# Patient Record
Sex: MALE | Race: WHITE | NOT HISPANIC OR LATINO | Employment: OTHER | URBAN - METROPOLITAN AREA
[De-identification: names, ages, dates, MRNs, and addresses within clinical notes are randomized per-mention and may not be internally consistent; named-entity substitution may affect disease eponyms.]

---

## 2024-09-23 ENCOUNTER — HOSPITAL ENCOUNTER (INPATIENT)
Facility: HOSPITAL | Age: 59
LOS: 5 days | Discharge: HOME | DRG: 897 | End: 2024-09-28
Attending: EMERGENCY MEDICINE | Admitting: INTERNAL MEDICINE
Payer: COMMERCIAL

## 2024-09-23 DIAGNOSIS — I48.91 ATRIAL FIBRILLATION WITH RAPID VENTRICULAR RESPONSE: ICD-10-CM

## 2024-09-23 DIAGNOSIS — R79.89 ELEVATED TROPONIN I LEVEL: Primary | ICD-10-CM

## 2024-09-23 DIAGNOSIS — I21.4 NSTEMI (NON-ST ELEVATED MYOCARDIAL INFARCTION): ICD-10-CM

## 2024-09-23 DIAGNOSIS — I25.10 CAD (CORONARY ARTERY DISEASE): ICD-10-CM

## 2024-09-23 DIAGNOSIS — N17.9 AKI (ACUTE KIDNEY INJURY): ICD-10-CM

## 2024-09-23 DIAGNOSIS — F10.939 ALCOHOL WITHDRAWAL: ICD-10-CM

## 2024-09-23 LAB
AMPHET UR QL SCN: NEGATIVE
ANION GAP SERPL CALC-SCNC: 11 MEQ/L
ANION GAP SERPL CALC-SCNC: 19 MEQ/L
BACTERIA #/AREA URNS AUTO: ABNORMAL /HPF
BARBITURATE SCN PRESENT UR: NEGATIVE
BASOPHILS # BLD AUTO: 0.02 X10(3)/MCL
BASOPHILS NFR BLD AUTO: 0.1 %
BENZODIAZ UR QL SCN: NEGATIVE
BILIRUB UR QL STRIP.AUTO: NEGATIVE
BNP BLD-MCNC: 106.7 PG/ML
BUN SERPL-MCNC: 34.7 MG/DL (ref 8.4–25.7)
BUN SERPL-MCNC: 38.2 MG/DL (ref 8.4–25.7)
CALCIUM SERPL-MCNC: 7.9 MG/DL (ref 8.8–10)
CALCIUM SERPL-MCNC: 9.1 MG/DL (ref 8.8–10)
CANNABINOIDS UR QL SCN: NEGATIVE
CHLORIDE SERPL-SCNC: 79 MMOL/L (ref 98–107)
CHLORIDE SERPL-SCNC: 90 MMOL/L (ref 98–107)
CLARITY UR: ABNORMAL
CO2 SERPL-SCNC: 33 MMOL/L (ref 23–31)
CO2 SERPL-SCNC: 33 MMOL/L (ref 23–31)
COCAINE UR QL SCN: NEGATIVE
COLOR UR AUTO: YELLOW
CREAT SERPL-MCNC: 3.3 MG/DL (ref 0.73–1.18)
CREAT SERPL-MCNC: 3.99 MG/DL (ref 0.73–1.18)
CREAT/UREA NIT SERPL: 12
CREAT/UREA NIT SERPL: 9
EOSINOPHIL # BLD AUTO: 0.03 X10(3)/MCL (ref 0–0.9)
EOSINOPHIL NFR BLD AUTO: 0.2 %
ERYTHROCYTE [DISTWIDTH] IN BLOOD BY AUTOMATED COUNT: 13.3 % (ref 11.5–17)
ETHANOL SERPL-MCNC: <10 MG/DL
FENTANYL UR QL SCN: NEGATIVE
GFR SERPLBLD CREATININE-BSD FMLA CKD-EPI: 16 ML/MIN/1.73/M2
GFR SERPLBLD CREATININE-BSD FMLA CKD-EPI: 21 ML/MIN/1.73/M2
GLUCOSE SERPL-MCNC: 104 MG/DL (ref 82–115)
GLUCOSE SERPL-MCNC: 131 MG/DL (ref 82–115)
GLUCOSE UR QL STRIP: NORMAL
HCT VFR BLD AUTO: 46 % (ref 42–52)
HGB BLD-MCNC: 16.3 G/DL (ref 14–18)
HGB UR QL STRIP: ABNORMAL
HOLD SPECIMEN: NORMAL
HOLD SPECIMEN: NORMAL
HYALINE CASTS #/AREA URNS LPF: ABNORMAL /LPF
IMM GRANULOCYTES # BLD AUTO: 0.09 X10(3)/MCL (ref 0–0.04)
IMM GRANULOCYTES NFR BLD AUTO: 0.6 %
KETONES UR QL STRIP: NEGATIVE
LACTATE SERPL-SCNC: 1.5 MMOL/L (ref 0.5–2.2)
LEUKOCYTE ESTERASE UR QL STRIP: NEGATIVE
LIPASE SERPL-CCNC: 54 U/L
LYMPHOCYTES # BLD AUTO: 1.81 X10(3)/MCL (ref 0.6–4.6)
LYMPHOCYTES NFR BLD AUTO: 11.7 %
MAGNESIUM SERPL-MCNC: 1.5 MG/DL (ref 1.6–2.6)
MAGNESIUM SERPL-MCNC: 2.2 MG/DL (ref 1.6–2.6)
MCH RBC QN AUTO: 29.3 PG (ref 27–31)
MCHC RBC AUTO-ENTMCNC: 35.4 G/DL (ref 33–36)
MCV RBC AUTO: 82.6 FL (ref 80–94)
MDMA UR QL SCN: NEGATIVE
MONOCYTES # BLD AUTO: 0.82 X10(3)/MCL (ref 0.1–1.3)
MONOCYTES NFR BLD AUTO: 5.3 %
MUCOUS THREADS URNS QL MICRO: ABNORMAL /LPF
NEUTROPHILS # BLD AUTO: 12.73 X10(3)/MCL (ref 2.1–9.2)
NEUTROPHILS NFR BLD AUTO: 82.1 %
NITRITE UR QL STRIP: NEGATIVE
NRBC BLD AUTO-RTO: 0 %
OPIATES UR QL SCN: NEGATIVE
PCP UR QL: NEGATIVE
PH UR STRIP: 6 [PH]
PH UR: 5.5 [PH] (ref 3–11)
PHOSPHATE SERPL-MCNC: 3.5 MG/DL (ref 2.3–4.7)
PLATELET # BLD AUTO: 282 X10(3)/MCL (ref 130–400)
PMV BLD AUTO: 9.7 FL (ref 7.4–10.4)
POTASSIUM SERPL-SCNC: 2.7 MMOL/L (ref 3.5–5.1)
POTASSIUM SERPL-SCNC: 2.8 MMOL/L (ref 3.5–5.1)
PROT UR QL STRIP: ABNORMAL
RBC # BLD AUTO: 5.57 X10(6)/MCL (ref 4.7–6.1)
RBC #/AREA URNS AUTO: ABNORMAL /HPF
SALICYLATES SERPL-MCNC: <5 MG/DL (ref 15–30)
SODIUM SERPL-SCNC: 131 MMOL/L (ref 136–145)
SODIUM SERPL-SCNC: 134 MMOL/L (ref 136–145)
SODIUM UR-SCNC: 27 MMOL/L
SP GR UR STRIP.AUTO: 1.01 (ref 1–1.03)
SPECIFIC GRAVITY, URINE AUTO (.000) (OHS): 1 (ref 1–1.03)
SQUAMOUS #/AREA URNS LPF: ABNORMAL /HPF
TROPONIN I SERPL-MCNC: 0.23 NG/ML (ref 0–0.04)
TROPONIN I SERPL-MCNC: 0.39 NG/ML (ref 0–0.04)
UROBILINOGEN UR STRIP-ACNC: NORMAL
WBC # BLD AUTO: 15.5 X10(3)/MCL (ref 4.5–11.5)
WBC #/AREA URNS AUTO: ABNORMAL /HPF

## 2024-09-23 PROCEDURE — 84484 ASSAY OF TROPONIN QUANT: CPT | Performed by: EMERGENCY MEDICINE

## 2024-09-23 PROCEDURE — 80048 BASIC METABOLIC PNL TOTAL CA: CPT | Performed by: EMERGENCY MEDICINE

## 2024-09-23 PROCEDURE — 87040 BLOOD CULTURE FOR BACTERIA: CPT | Performed by: EMERGENCY MEDICINE

## 2024-09-23 PROCEDURE — 80179 DRUG ASSAY SALICYLATE: CPT

## 2024-09-23 PROCEDURE — 80320 DRUG SCREEN QUANTALCOHOLS: CPT

## 2024-09-23 PROCEDURE — 25000003 PHARM REV CODE 250

## 2024-09-23 PROCEDURE — 63600175 PHARM REV CODE 636 W HCPCS: Performed by: EMERGENCY MEDICINE

## 2024-09-23 PROCEDURE — 84300 ASSAY OF URINE SODIUM: CPT

## 2024-09-23 PROCEDURE — 83605 ASSAY OF LACTIC ACID: CPT | Performed by: EMERGENCY MEDICINE

## 2024-09-23 PROCEDURE — 11000001 HC ACUTE MED/SURG PRIVATE ROOM

## 2024-09-23 PROCEDURE — 63600175 PHARM REV CODE 636 W HCPCS: Performed by: INTERNAL MEDICINE

## 2024-09-23 PROCEDURE — 93005 ELECTROCARDIOGRAM TRACING: CPT

## 2024-09-23 PROCEDURE — 84100 ASSAY OF PHOSPHORUS: CPT

## 2024-09-23 PROCEDURE — 36415 COLL VENOUS BLD VENIPUNCTURE: CPT

## 2024-09-23 PROCEDURE — 96375 TX/PRO/DX INJ NEW DRUG ADDON: CPT

## 2024-09-23 PROCEDURE — 21400001 HC TELEMETRY ROOM

## 2024-09-23 PROCEDURE — 99291 CRITICAL CARE FIRST HOUR: CPT

## 2024-09-23 PROCEDURE — 83690 ASSAY OF LIPASE: CPT | Performed by: EMERGENCY MEDICINE

## 2024-09-23 PROCEDURE — 83735 ASSAY OF MAGNESIUM: CPT

## 2024-09-23 PROCEDURE — 85025 COMPLETE CBC W/AUTO DIFF WBC: CPT | Performed by: EMERGENCY MEDICINE

## 2024-09-23 PROCEDURE — 84484 ASSAY OF TROPONIN QUANT: CPT

## 2024-09-23 PROCEDURE — 25000003 PHARM REV CODE 250: Performed by: INTERNAL MEDICINE

## 2024-09-23 PROCEDURE — 63600175 PHARM REV CODE 636 W HCPCS

## 2024-09-23 PROCEDURE — 96365 THER/PROPH/DIAG IV INF INIT: CPT | Mod: 59

## 2024-09-23 PROCEDURE — 96361 HYDRATE IV INFUSION ADD-ON: CPT

## 2024-09-23 PROCEDURE — 80048 BASIC METABOLIC PNL TOTAL CA: CPT

## 2024-09-23 PROCEDURE — 25000003 PHARM REV CODE 250: Performed by: EMERGENCY MEDICINE

## 2024-09-23 PROCEDURE — 80307 DRUG TEST PRSMV CHEM ANLYZR: CPT

## 2024-09-23 PROCEDURE — 82077 ASSAY SPEC XCP UR&BREATH IA: CPT | Performed by: EMERGENCY MEDICINE

## 2024-09-23 PROCEDURE — 83880 ASSAY OF NATRIURETIC PEPTIDE: CPT | Performed by: EMERGENCY MEDICINE

## 2024-09-23 PROCEDURE — 83735 ASSAY OF MAGNESIUM: CPT | Performed by: EMERGENCY MEDICINE

## 2024-09-23 PROCEDURE — 81001 URINALYSIS AUTO W/SCOPE: CPT | Performed by: EMERGENCY MEDICINE

## 2024-09-23 RX ORDER — POTASSIUM CHLORIDE 20 MEQ/1
40 TABLET, EXTENDED RELEASE ORAL ONCE
Status: COMPLETED | OUTPATIENT
Start: 2024-09-23 | End: 2024-09-23

## 2024-09-23 RX ORDER — CHLORDIAZEPOXIDE HYDROCHLORIDE 25 MG/1
25 CAPSULE, GELATIN COATED ORAL
Status: DISCONTINUED | OUTPATIENT
Start: 2024-09-29 | End: 2024-09-28 | Stop reason: HOSPADM

## 2024-09-23 RX ORDER — LIDOCAINE HYDROCHLORIDE 20 MG/ML
15 SOLUTION OROPHARYNGEAL ONCE
Status: COMPLETED | OUTPATIENT
Start: 2024-09-23 | End: 2024-09-23

## 2024-09-23 RX ORDER — SODIUM CHLORIDE, SODIUM LACTATE, POTASSIUM CHLORIDE, CALCIUM CHLORIDE 600; 310; 30; 20 MG/100ML; MG/100ML; MG/100ML; MG/100ML
INJECTION, SOLUTION INTRAVENOUS CONTINUOUS
Status: DISCONTINUED | OUTPATIENT
Start: 2024-09-23 | End: 2024-09-24

## 2024-09-23 RX ORDER — LISINOPRIL 40 MG/1
40 TABLET ORAL DAILY
Status: ON HOLD | COMMUNITY
End: 2024-09-27 | Stop reason: HOSPADM

## 2024-09-23 RX ORDER — POTASSIUM CHLORIDE 20 MEQ/1
40 TABLET, EXTENDED RELEASE ORAL ONCE
Status: COMPLETED | OUTPATIENT
Start: 2024-09-24 | End: 2024-09-23

## 2024-09-23 RX ORDER — THIAMINE HCL 100 MG
100 TABLET ORAL DAILY
Status: DISCONTINUED | OUTPATIENT
Start: 2024-09-24 | End: 2024-09-28 | Stop reason: HOSPADM

## 2024-09-23 RX ORDER — CHLORDIAZEPOXIDE HYDROCHLORIDE 25 MG/1
25 CAPSULE, GELATIN COATED ORAL
Status: COMPLETED | OUTPATIENT
Start: 2024-09-27 | End: 2024-09-27

## 2024-09-23 RX ORDER — SODIUM CHLORIDE 0.9 % (FLUSH) 0.9 %
10 SYRINGE (ML) INJECTION
Status: DISCONTINUED | OUTPATIENT
Start: 2024-09-23 | End: 2024-09-28 | Stop reason: HOSPADM

## 2024-09-23 RX ORDER — NOREPINEPHRINE BITARTRATE/D5W 4MG/250ML
0-3 PLASTIC BAG, INJECTION (ML) INTRAVENOUS CONTINUOUS
Status: DISCONTINUED | OUTPATIENT
Start: 2024-09-23 | End: 2024-09-25

## 2024-09-23 RX ORDER — CHLORDIAZEPOXIDE HYDROCHLORIDE 25 MG/1
100 CAPSULE, GELATIN COATED ORAL ONCE
Status: COMPLETED | OUTPATIENT
Start: 2024-09-23 | End: 2024-09-24

## 2024-09-23 RX ORDER — CHLORDIAZEPOXIDE HYDROCHLORIDE 25 MG/1
25 CAPSULE, GELATIN COATED ORAL
Status: DISCONTINUED | OUTPATIENT
Start: 2024-09-28 | End: 2024-09-28 | Stop reason: HOSPADM

## 2024-09-23 RX ORDER — CHLORDIAZEPOXIDE HYDROCHLORIDE 25 MG/1
50 CAPSULE, GELATIN COATED ORAL
Status: COMPLETED | OUTPATIENT
Start: 2024-09-24 | End: 2024-09-24

## 2024-09-23 RX ORDER — NOREPINEPHRINE BITARTRATE/D5W 4MG/250ML
0-3 PLASTIC BAG, INJECTION (ML) INTRAVENOUS CONTINUOUS
Status: DISCONTINUED | OUTPATIENT
Start: 2024-09-23 | End: 2024-09-23

## 2024-09-23 RX ORDER — POTASSIUM CHLORIDE 7.45 MG/ML
10 INJECTION INTRAVENOUS
Status: COMPLETED | OUTPATIENT
Start: 2024-09-24 | End: 2024-09-24

## 2024-09-23 RX ORDER — SODIUM CHLORIDE AND POTASSIUM CHLORIDE 150; 900 MG/100ML; MG/100ML
INJECTION, SOLUTION INTRAVENOUS
Status: COMPLETED | OUTPATIENT
Start: 2024-09-23 | End: 2024-09-23

## 2024-09-23 RX ORDER — THIAMINE HYDROCHLORIDE 100 MG/ML
400 INJECTION, SOLUTION INTRAMUSCULAR; INTRAVENOUS
Status: COMPLETED | OUTPATIENT
Start: 2024-09-23 | End: 2024-09-23

## 2024-09-23 RX ORDER — CHLORDIAZEPOXIDE HYDROCHLORIDE 25 MG/1
25 CAPSULE, GELATIN COATED ORAL
Status: COMPLETED | OUTPATIENT
Start: 2024-09-26 | End: 2024-09-26

## 2024-09-23 RX ORDER — LORAZEPAM 2 MG/ML
1 INJECTION INTRAMUSCULAR
Status: DISCONTINUED | OUTPATIENT
Start: 2024-09-23 | End: 2024-09-23

## 2024-09-23 RX ORDER — LORAZEPAM 2 MG/ML
2 INJECTION INTRAMUSCULAR
Status: DISCONTINUED | OUTPATIENT
Start: 2024-09-23 | End: 2024-09-28 | Stop reason: HOSPADM

## 2024-09-23 RX ORDER — TALC
6 POWDER (GRAM) TOPICAL NIGHTLY PRN
Status: DISCONTINUED | OUTPATIENT
Start: 2024-09-23 | End: 2024-09-28 | Stop reason: HOSPADM

## 2024-09-23 RX ORDER — LIDOCAINE HYDROCHLORIDE 20 MG/ML
15 SOLUTION OROPHARYNGEAL
Status: COMPLETED | OUTPATIENT
Start: 2024-09-23 | End: 2024-09-23

## 2024-09-23 RX ORDER — MAGNESIUM SULFATE HEPTAHYDRATE 40 MG/ML
2 INJECTION, SOLUTION INTRAVENOUS
Status: COMPLETED | OUTPATIENT
Start: 2024-09-23 | End: 2024-09-23

## 2024-09-23 RX ORDER — ALUMINUM HYDROXIDE, MAGNESIUM HYDROXIDE, AND SIMETHICONE 1200; 120; 1200 MG/30ML; MG/30ML; MG/30ML
30 SUSPENSION ORAL ONCE
Status: COMPLETED | OUTPATIENT
Start: 2024-09-23 | End: 2024-09-23

## 2024-09-23 RX ORDER — CHLORDIAZEPOXIDE HYDROCHLORIDE 25 MG/1
50 CAPSULE, GELATIN COATED ORAL
Status: COMPLETED | OUTPATIENT
Start: 2024-09-25 | End: 2024-09-25

## 2024-09-23 RX ORDER — FOLIC ACID 1 MG/1
1 TABLET ORAL DAILY
Status: DISCONTINUED | OUTPATIENT
Start: 2024-09-24 | End: 2024-09-28 | Stop reason: HOSPADM

## 2024-09-23 RX ORDER — METOPROLOL TARTRATE 1 MG/ML
5 INJECTION, SOLUTION INTRAVENOUS EVERY 5 MIN PRN
Status: DISCONTINUED | OUTPATIENT
Start: 2024-09-23 | End: 2024-09-23

## 2024-09-23 RX ORDER — METOPROLOL TARTRATE 1 MG/ML
5 INJECTION, SOLUTION INTRAVENOUS EVERY 5 MIN PRN
Status: DISCONTINUED | OUTPATIENT
Start: 2024-09-23 | End: 2024-09-28 | Stop reason: HOSPADM

## 2024-09-23 RX ADMIN — ALUMINUM HYDROXIDE AND MAGNESIUM HYDROXIDE 30 ML: 200; 200 SUSPENSION ORAL at 04:09

## 2024-09-23 RX ADMIN — SODIUM CHLORIDE 1000 ML: 9 INJECTION, SOLUTION INTRAVENOUS at 05:09

## 2024-09-23 RX ADMIN — ALUMINUM HYDROXIDE, MAGNESIUM HYDROXIDE, AND DIMETHICONE 30 ML: 200; 20; 200 SUSPENSION ORAL at 06:09

## 2024-09-23 RX ADMIN — PIPERACILLIN SODIUM AND TAZOBACTAM SODIUM 4.5 G: 4; .5 INJECTION, POWDER, LYOPHILIZED, FOR SOLUTION INTRAVENOUS at 06:09

## 2024-09-23 RX ADMIN — SODIUM CHLORIDE 1000 ML: 9 INJECTION, SOLUTION INTRAVENOUS at 04:09

## 2024-09-23 RX ADMIN — POTASSIUM CHLORIDE 40 MEQ: 1500 TABLET, EXTENDED RELEASE ORAL at 08:09

## 2024-09-23 RX ADMIN — POTASSIUM CHLORIDE 40 MEQ: 1500 TABLET, EXTENDED RELEASE ORAL at 11:09

## 2024-09-23 RX ADMIN — SODIUM CHLORIDE, POTASSIUM CHLORIDE, SODIUM LACTATE AND CALCIUM CHLORIDE: 600; 310; 30; 20 INJECTION, SOLUTION INTRAVENOUS at 09:09

## 2024-09-23 RX ADMIN — POTASSIUM CHLORIDE 10 MEQ: 7.46 INJECTION, SOLUTION INTRAVENOUS at 11:09

## 2024-09-23 RX ADMIN — LIDOCAINE HYDROCHLORIDE 15 ML: 20 SOLUTION ORAL at 04:09

## 2024-09-23 RX ADMIN — LIDOCAINE HYDROCHLORIDE 15 ML: 20 SOLUTION ORAL at 06:09

## 2024-09-23 RX ADMIN — CHLORDIAZEPOXIDE HYDROCHLORIDE 100 MG: 25 CAPSULE ORAL at 08:09

## 2024-09-23 RX ADMIN — MAGNESIUM SULFATE HEPTAHYDRATE 2 G: 40 INJECTION, SOLUTION INTRAVENOUS at 06:09

## 2024-09-23 RX ADMIN — VANCOMYCIN HYDROCHLORIDE 1500 MG: 1.5 INJECTION, POWDER, LYOPHILIZED, FOR SOLUTION INTRAVENOUS at 09:09

## 2024-09-23 RX ADMIN — POTASSIUM CHLORIDE AND SODIUM CHLORIDE: 900; 150 INJECTION, SOLUTION INTRAVENOUS at 06:09

## 2024-09-23 RX ADMIN — THIAMINE HYDROCHLORIDE 400 MG: 100 INJECTION, SOLUTION INTRAMUSCULAR; INTRAVENOUS at 04:09

## 2024-09-23 NOTE — ED PROVIDER NOTES
ED PROVIDER NOTE  9/23/2024    CHIEF COMPLAINT:   Chief Complaint   Patient presents with    Emesis     N/V x 4 days due to ETOH detoxing x 4 days at hotel Denies SI, denies HI, denies hallucinations. [facility] also reports Afib on monitor, Cardizem 20mg and 1000ml NS given.  PTO.        HISTORY OF PRESENT ILLNESS:   Gabe Barnett is a 59 y.o. male who presents with chief complaint Alcohol withdrawals.  Onset was about 4 days ago after being on a 4 day bend or drinking about a qt of liquor a day whenever he began having persistent nausea and vomiting along with indigestion and tremors and difficulty sleeping.  He states that he has been having the worst indigestion, but otherwise denies having any chest pain.  States he was not noticed any blood in his vomitus.  States he was felt very weak.  EMS reports he was in atrial fibrillation with rapid ventricular response they gave him 20 mg of Cardizem along with 4 mg of Zofran.  Patient states that he was never had withdrawals this severe before.    The history is provided by the patient.         REVIEW OF SYSTEMS: as noted in the HPI.  NURSING NOTES REVIEWED      PAST MEDICAL/SURGICAL HISTORY: No past medical history on file. No past surgical history on file.    FAMILY HISTORY: No family history on file.    SOCIAL HISTORY:      ALLERGIES:   Review of patient's allergies indicates:   Allergen Reactions    Niacin Other (See Comments)     I get flushed and burn up       PHYSICAL EXAM:  Initial Vitals [09/23/24 1544]   BP Pulse Resp Temp SpO2   104/68 72 15 97.9 °F (36.6 °C) (!) 92 %      MAP       --         Physical Exam    Nursing note and vitals reviewed.  Constitutional: He appears well-developed and well-nourished. No distress.   HENT:   Head: Normocephalic and atraumatic.   Nose: Nose normal.   Mouth/Throat: Oropharynx is clear and moist and mucous membranes are normal.   Eyes: Conjunctivae and EOM are normal. Pupils are equal, round, and reactive to light.   Neck:  Neck supple. No tracheal deviation present.   Cardiovascular:  Normal heart sounds, intact distal pulses and normal pulses. An irregularly irregular rhythm present.   Tachycardia present.         Pulmonary/Chest: Effort normal and breath sounds normal. No respiratory distress.   Abdominal: Abdomen is soft. There is abdominal tenderness in the epigastric area. There is no rebound and no guarding.   Musculoskeletal:         General: Normal range of motion.      Cervical back: Neck supple.     Neurological: He is alert and oriented to person, place, and time. He displays tremor. GCS eye subscore is 4. GCS verbal subscore is 5. GCS motor subscore is 6.   CN II-XII intact. Moves all extremities. No gross sensory or motor deficits.   Skin: Skin is warm, dry and intact.   Psychiatric: His speech is normal and behavior is normal. Judgment and thought content normal. His mood appears anxious. Cognition and memory are normal.         RESULTS:  Labs Reviewed   BASIC METABOLIC PANEL - Abnormal       Result Value    Sodium 131 (*)     Potassium 2.7 (*)     Chloride 79 (*)     CO2 33 (*)     Glucose 104      Blood Urea Nitrogen 34.7 (*)     Creatinine 3.99 (*)     BUN/Creatinine Ratio 9      Calcium 9.1      Anion Gap 19.0      eGFR 16     B-TYPE NATRIURETIC PEPTIDE - Abnormal    Natriuretic Peptide 106.7 (*)    MAGNESIUM - Abnormal    Magnesium Level 1.50 (*)    TROPONIN I - Abnormal    Troponin-I 0.228 (*)    CBC WITH DIFFERENTIAL - Abnormal    WBC 15.50 (*)     RBC 5.57      Hgb 16.3      Hct 46.0      MCV 82.6      MCH 29.3      MCHC 35.4      RDW 13.3      Platelet 282      MPV 9.7      Neut % 82.1      Lymph % 11.7      Mono % 5.3      Eos % 0.2      Basophil % 0.1      Lymph # 1.81      Neut # 12.73 (*)     Mono # 0.82      Eos # 0.03      Baso # 0.02      IG# 0.09 (*)     IG% 0.6      NRBC% 0.0     ALCOHOL,MEDICAL (ETHANOL) - Normal    Ethanol Level <10.0     BLOOD CULTURE OLG   BLOOD CULTURE [facility]   CBC W/ AUTO  DIFFERENTIAL    Narrative:     The following orders were created for panel order CBC auto differential.  Procedure                               Abnormality         Status                     ---------                               -----------         ------                     CBC with Differential[[id number]]       Abnormal            Final result                 Please view results for these tests on the individual orders.   EXTRA TUBES    Narrative:     The following orders were created for panel order EXTRA TUBES.  Procedure                               Abnormality         Status                     ---------                               -----------         ------                     Light Blue Top Hold[[id number]]                             Final result               Gold Top Hold[[id number]]                                   Final result                 Please view results for these tests on the individual orders.   LIGHT BLUE TOP HOLD    Extra Tube Hold for add-ons.     GOLD TOP HOLD    Extra Tube Hold for add-ons.     URINALYSIS, REFLEX TO URINE CULTURE   LACTIC ACID, PLASMA   LIPASE     Imaging Results              X-Ray Chest AP Portable (Final result)  Result time 09/23/24 16:43:12      Final result by Seamus Nichols MD (09/23/24 16:43:12)                   Impression:      No acute cardiopulmonary process identified.      Electronically signed by: Seamus Nichols  Date:    09/23/2024  Time:    16:43               Narrative:    EXAMINATION:  XR CHEST AP PORTABLE    CLINICAL HISTORY:  hypotension;    TECHNIQUE:  One view    COMPARISON:  None available.    FINDINGS:  Cardiopericardial silhouette is within normal limits.  No acute dense focal or segmental consolidation, congestive process, pleural effusions or pneumothorax.                        Wet Read by James Olson DO (09/23/24 16:41:10, [facility], Emergency Medicine)    No dense lobar consolidation or pneumothorax.                                     PROCEDURES:  Critical Care    Date/Time: 9/23/2024 4:49 PM    Performed by: James Olson DO  Authorized by: James Olson DO  Direct patient critical care time: 25 minutes  Ordering / reviewing critical care time: 10 minutes  Documentation critical care time: 5 minutes  Total critical care time (exclusive of procedural time) : 40 minutes  Critical care time was exclusive of separately billable procedures and treating other patients.  Critical care was necessary to treat or prevent imminent or life-threatening deterioration of the following conditions: circulatory failure, sepsis, shock and renal failure.  Critical care was time spent personally by me on the following activities: development of treatment plan with patient or surrogate, interpretation of cardiac output measurements, evaluation of patient's response to treatment, examination of patient, obtaining history from patient or surrogate, ordering and performing treatments and interventions, ordering and review of laboratory studies, ordering and review of radiographic studies, pulse oximetry and re-evaluation of patient's condition.          ECG:  EKG Readings: (Independently Interpreted)   Initial Reading: No STEMI. Rhythm: Atrial Fibrillation. Heart Rate: 119. Ectopy: PVCs. Conduction: Normal. Axis: Normal.       ED COURSE AND MEDICAL DECISION MAKING:  Medications   sodium chloride 0.9% bolus 2,259 mL 2,259 mL (has no administration in time range)   piperacillin-tazobactam (ZOSYN) 4.5 g in D5W 100 mL IVPB (MB+) (has no administration in time range)   sodium chloride 0.9% bolus 1,000 mL 1,000 mL (has no administration in time range)   magnesium sulfate 2g in water 50mL IVPB (premix) (has no administration in time range)   0.9 % NaCl with KCl 20 mEq infusion (has no administration in time range)   NORepinephrine 4 mg in dextrose 5% 250 mL infusion (premix) (has no administration in time range)   thiamine injection 400 mg  (400 mg Intravenous Given 9/23/24 1621)   aluminum-magnesium hydroxide 200-200 mg/5 mL suspension 30 mL (30 mLs Oral Given 9/23/24 1621)   LIDOcaine viscous HCl 2% oral solution 15 mL (15 mLs Oral Given 9/23/24 1621)     ED Course as of 09/23/24 1714   Mon Sep 23, 2024   1619 WBC(!): 15.50 [IB]   1619 Hemoglobin: 16.3 [IB]   1619 Platelet Count: 282 [IB]   1640 BNP(!): 106.7 [IB]   1640 Alcohol, Serum: <10.0 [IB]   1640 Troponin I(!): 0.228 [IB]      ED Course User Index  [IB] James Olson, DO        Medical Decision Making  58 yo male PMHx HTN, HLD, GERD, alcohol abuse presents with alcohol withdrawal for the past 4 days.  He was given 20 mg diltiazem by EMS prior to arrival.  He was initial pressure was systolic in the 70s.  Bolus with IV fluids with improvement in his blood pressure.  CBC returned showing a leukocytosis of 15.5, so sepsis orders were initiated and he was given 30 milliliter/kg fluid bolus of normal saline along with IV thiamine given his known alcohol use and poor p.o. intake.  Troponin elevated at 0.228.  .  Suspect type 2 NSTEMI.  Hypomagnesemic at 1.5 with a hypokalemia of 2.7, repletion initiated with 2 g IV magnesium and 20 mEq KCL IV.  Creatinine 3.99, no previous labs for comparison.  He does continue to have dips in his blood pressure after fluid administration currently on his 3rd bolus of fluids, so started on Levophed to maintain a MAP > 65.  I have spoken with the patient and/or caregivers. I have explained the patient's condition, diagnoses and treatment plan based on the information available to me at this time. I have answered the patient's and/or caregiver's questions and addressed any concerns. The patient and/or caregivers have as good an understanding of the patient's diagnosis, condition and treatment plan as can be expected at this point. The patient has been stabilized within the capability of the emergency department. The patient will be transported for further  care and management or will be moved to an observation or inpatient service. I have communicated with the staff or medical practitioner taking over this patient's care.    Amount and/or Complexity of Data Reviewed  Independent Historian: EMS  Labs: ordered. Decision-making details documented in ED Course.  Radiology: ordered and independent interpretation performed.  ECG/medicine tests: ordered and independent interpretation performed.    Risk  OTC drugs.  Prescription drug management.  Drug therapy requiring intensive monitoring for toxicity.  Decision regarding hospitalization.        CLINICAL IMPRESSION:  1. Elevated troponin I level    2. Atrial fibrillation with rapid ventricular response    3. Alcohol withdrawal    4. NOE (acute kidney injury)        DISPOSITION:   ED Disposition Condition    Admit Stable                    James Olson,   09/23/24 8785

## 2024-09-24 LAB
ALBUMIN SERPL-MCNC: 3.4 G/DL (ref 3.4–4.8)
ALBUMIN/GLOB SERPL: 1.3 RATIO (ref 1.1–2)
ALP SERPL-CCNC: 70 UNIT/L (ref 40–150)
ALT SERPL-CCNC: 29 UNIT/L (ref 0–55)
ANION GAP SERPL CALC-SCNC: 9 MEQ/L
AST SERPL-CCNC: 62 UNIT/L (ref 5–34)
BASOPHILS # BLD AUTO: 0.02 X10(3)/MCL
BASOPHILS NFR BLD AUTO: 0.2 %
BILIRUB SERPL-MCNC: 1.5 MG/DL
BUN SERPL-MCNC: 36.1 MG/DL (ref 8.4–25.7)
CALCIUM SERPL-MCNC: 8.5 MG/DL (ref 8.8–10)
CHLORIDE SERPL-SCNC: 94 MMOL/L (ref 98–107)
CO2 SERPL-SCNC: 33 MMOL/L (ref 23–31)
CREAT SERPL-MCNC: 2.87 MG/DL (ref 0.73–1.18)
CREAT/UREA NIT SERPL: 13
EOSINOPHIL # BLD AUTO: 0.06 X10(3)/MCL (ref 0–0.9)
EOSINOPHIL NFR BLD AUTO: 0.7 %
ERYTHROCYTE [DISTWIDTH] IN BLOOD BY AUTOMATED COUNT: 13.7 % (ref 11.5–17)
GFR SERPLBLD CREATININE-BSD FMLA CKD-EPI: 24 ML/MIN/1.73/M2
GLOBULIN SER-MCNC: 2.7 GM/DL (ref 2.4–3.5)
GLUCOSE SERPL-MCNC: 86 MG/DL (ref 82–115)
HCT VFR BLD AUTO: 40.6 % (ref 42–52)
HGB BLD-MCNC: 13.7 G/DL (ref 14–18)
HOLD SPECIMEN: NORMAL
IMM GRANULOCYTES # BLD AUTO: 0.05 X10(3)/MCL (ref 0–0.04)
IMM GRANULOCYTES NFR BLD AUTO: 0.6 %
LYMPHOCYTES # BLD AUTO: 2.02 X10(3)/MCL (ref 0.6–4.6)
LYMPHOCYTES NFR BLD AUTO: 24.3 %
MAGNESIUM SERPL-MCNC: 2.5 MG/DL (ref 1.6–2.6)
MCH RBC QN AUTO: 28.9 PG (ref 27–31)
MCHC RBC AUTO-ENTMCNC: 33.7 G/DL (ref 33–36)
MCV RBC AUTO: 85.7 FL (ref 80–94)
MONOCYTES # BLD AUTO: 0.48 X10(3)/MCL (ref 0.1–1.3)
MONOCYTES NFR BLD AUTO: 5.8 %
NEUTROPHILS # BLD AUTO: 5.69 X10(3)/MCL (ref 2.1–9.2)
NEUTROPHILS NFR BLD AUTO: 68.4 %
NRBC BLD AUTO-RTO: 0 %
OHS QRS DURATION: 92 MS
OHS QRS DURATION: 94 MS
OHS QRS DURATION: 94 MS
OHS QRS DURATION: 96 MS
OHS QTC CALCULATION: 515 MS
OHS QTC CALCULATION: 531 MS
OHS QTC CALCULATION: 557 MS
OHS QTC CALCULATION: 576 MS
PHOSPHATE SERPL-MCNC: 3 MG/DL (ref 2.3–4.7)
PLATELET # BLD AUTO: 189 X10(3)/MCL (ref 130–400)
PMV BLD AUTO: 10.1 FL (ref 7.4–10.4)
POTASSIUM SERPL-SCNC: 3.7 MMOL/L (ref 3.5–5.1)
PROT SERPL-MCNC: 6.1 GM/DL (ref 5.8–7.6)
RBC # BLD AUTO: 4.74 X10(6)/MCL (ref 4.7–6.1)
SODIUM SERPL-SCNC: 136 MMOL/L (ref 136–145)
TROPONIN I SERPL-MCNC: 0.32 NG/ML (ref 0–0.04)
VANCOMYCIN SERPL-MCNC: 15.5 UG/ML (ref 15–20)
WBC # BLD AUTO: 8.32 X10(3)/MCL (ref 4.5–11.5)

## 2024-09-24 PROCEDURE — 84100 ASSAY OF PHOSPHORUS: CPT

## 2024-09-24 PROCEDURE — 25000003 PHARM REV CODE 250

## 2024-09-24 PROCEDURE — 25000003 PHARM REV CODE 250: Performed by: INTERNAL MEDICINE

## 2024-09-24 PROCEDURE — 94761 N-INVAS EAR/PLS OXIMETRY MLT: CPT

## 2024-09-24 PROCEDURE — 85025 COMPLETE CBC W/AUTO DIFF WBC: CPT

## 2024-09-24 PROCEDURE — 36415 COLL VENOUS BLD VENIPUNCTURE: CPT

## 2024-09-24 PROCEDURE — 93005 ELECTROCARDIOGRAM TRACING: CPT

## 2024-09-24 PROCEDURE — 63600175 PHARM REV CODE 636 W HCPCS

## 2024-09-24 PROCEDURE — 84484 ASSAY OF TROPONIN QUANT: CPT

## 2024-09-24 PROCEDURE — 21400001 HC TELEMETRY ROOM

## 2024-09-24 PROCEDURE — 83735 ASSAY OF MAGNESIUM: CPT

## 2024-09-24 PROCEDURE — 80202 ASSAY OF VANCOMYCIN: CPT

## 2024-09-24 PROCEDURE — 80053 COMPREHEN METABOLIC PANEL: CPT

## 2024-09-24 RX ORDER — SODIUM CHLORIDE 9 MG/ML
INJECTION, SOLUTION INTRAVENOUS CONTINUOUS
Status: DISCONTINUED | OUTPATIENT
Start: 2024-09-24 | End: 2024-09-27

## 2024-09-24 RX ORDER — FAMOTIDINE 20 MG/1
20 TABLET, FILM COATED ORAL DAILY
Status: DISCONTINUED | OUTPATIENT
Start: 2024-09-24 | End: 2024-09-28 | Stop reason: HOSPADM

## 2024-09-24 RX ADMIN — METOPROLOL SUCCINATE 12.5 MG: 25 TABLET, EXTENDED RELEASE ORAL at 08:09

## 2024-09-24 RX ADMIN — CHLORDIAZEPOXIDE HYDROCHLORIDE 50 MG: 25 CAPSULE ORAL at 01:09

## 2024-09-24 RX ADMIN — POTASSIUM CHLORIDE 10 MEQ: 7.46 INJECTION, SOLUTION INTRAVENOUS at 02:09

## 2024-09-24 RX ADMIN — POTASSIUM CHLORIDE 10 MEQ: 7.46 INJECTION, SOLUTION INTRAVENOUS at 03:09

## 2024-09-24 RX ADMIN — ALUMINUM HYDROXIDE AND MAGNESIUM HYDROXIDE 30 ML: 200; 200 SUSPENSION ORAL at 09:09

## 2024-09-24 RX ADMIN — CHLORDIAZEPOXIDE HYDROCHLORIDE 50 MG: 25 CAPSULE ORAL at 08:09

## 2024-09-24 RX ADMIN — FOLIC ACID 1 MG: 1 TABLET ORAL at 08:09

## 2024-09-24 RX ADMIN — PIPERACILLIN AND TAZOBACTAM 4.5 G: 4; .5 INJECTION, POWDER, LYOPHILIZED, FOR SOLUTION INTRAVENOUS; PARENTERAL at 06:09

## 2024-09-24 RX ADMIN — POTASSIUM CHLORIDE 10 MEQ: 7.46 INJECTION, SOLUTION INTRAVENOUS at 01:09

## 2024-09-24 RX ADMIN — METOPROLOL SUCCINATE 12.5 MG: 25 TABLET, EXTENDED RELEASE ORAL at 10:09

## 2024-09-24 RX ADMIN — THIAMINE HCL TAB 100 MG 100 MG: 100 TAB at 08:09

## 2024-09-24 RX ADMIN — PIPERACILLIN AND TAZOBACTAM 4.5 G: 4; .5 INJECTION, POWDER, LYOPHILIZED, FOR SOLUTION INTRAVENOUS; PARENTERAL at 01:09

## 2024-09-24 RX ADMIN — DEXTROSE MONOHYDRATE 1250 MG: 50 INJECTION, SOLUTION INTRAVENOUS at 10:09

## 2024-09-24 RX ADMIN — SODIUM CHLORIDE: 9 INJECTION, SOLUTION INTRAVENOUS at 10:09

## 2024-09-24 RX ADMIN — SODIUM CHLORIDE: 9 INJECTION, SOLUTION INTRAVENOUS at 03:09

## 2024-09-24 RX ADMIN — ALUMINUM HYDROXIDE AND MAGNESIUM HYDROXIDE 30 ML: 200; 200 SUSPENSION ORAL at 01:09

## 2024-09-24 RX ADMIN — FAMOTIDINE 20 MG: 20 TABLET ORAL at 10:09

## 2024-09-24 RX ADMIN — PIPERACILLIN AND TAZOBACTAM 4.5 G: 4; .5 INJECTION, POWDER, LYOPHILIZED, FOR SOLUTION INTRAVENOUS; PARENTERAL at 08:09

## 2024-09-24 RX ADMIN — CHLORDIAZEPOXIDE HYDROCHLORIDE 50 MG: 25 CAPSULE ORAL at 03:09

## 2024-09-24 NOTE — PLAN OF CARE
Problem: Adult Inpatient Plan of Care  Goal: Plan of Care Review  Outcome: Progressing  Goal: Patient-Specific Goal (Individualized)  Outcome: Progressing  Goal: Absence of Hospital-Acquired Illness or Injury  Outcome: Progressing  Goal: Optimal Comfort and Wellbeing  Outcome: Progressing  Goal: Readiness for Transition of Care  Outcome: Progressing     Problem: Fall Injury Risk  Goal: Absence of Fall and Fall-Related Injury  Outcome: Progressing     Problem: Nausea and Vomiting  Goal: Nausea and Vomiting Relief  Outcome: Progressing

## 2024-09-24 NOTE — PROGRESS NOTES
Pharmacokinetic Assessment Follow Up: IV Vancomycin    Vancomycin serum concentration assessment(s):    The random level was drawn correctly and can be used to guide therapy at this time. The measurement is within the desired definitive target range of 15 to 20 mcg/mL.    Vancomycin Regimen Plan:    Change regimen to Vancomycin 1,250 mg IV every 24 hours with next serum trough concentration measured at 0830 prior to third dose on 9/25/24.    Drug levels (last 3 results):  Recent Labs   Lab Result Units 09/24/24  0352   Vancomycin Random ug/ml 15.5       Pharmacy will continue to follow and monitor vancomycin.    Please contact pharmacy at extension [phone] for questions regarding this assessment.    Thank you for the consult,   Marj Jeffery, Pauline       Patient brief summary:  Gabe Barnett is a 59 y.o. male initiated on antimicrobial therapy with IV Vancomycin for treatment of sepsis    The patient's current regimen is 1,250 mg q24h    Drug Allergies:   Review of patient's allergies indicates:   Allergen Reactions    Niacin Other (See Comments)     I get flushed and burn up       Actual Body Weight:   108 kg    Renal Function:   Estimated Creatinine Clearance: 34.2 mL/min (A) (based on SCr of 2.87 mg/dL (H)).,     Dialysis Method (if applicable):  N/A    CBC (last 72 hours):  Recent Labs   Lab Result Units 09/23/24  1549 09/24/24  0352   WBC x10(3)/mcL 15.50* 8.32   Hgb g/dL 16.3 13.7*   Hct % 46.0 40.6*   Platelet x10(3)/mcL 282 189   Mono % % 5.3 5.8   Eos % % 0.2 0.7   Basophil % % 0.1 0.2       Metabolic Panel (last 72 hours):  Recent Labs   Lab Result Units 09/23/24  1549 09/23/24  1745 09/23/24 2013 09/23/24  2159 09/24/24  0352   Sodium mmol/L 131*  --   --  134* 136   Urine Sodium mmol/L  --   --  27.0  --   --    Potassium mmol/L 2.7*  --   --  2.8* 3.7   Chloride mmol/L 79*  --   --  90* 94*   CO2 mmol/L 33*  --   --  33* 33*   Glucose mg/dL 104  --   --  131* 86   Glucose, UA   --  Normal  --   --   --     Blood Urea Nitrogen mg/dL 34.7*  --   --  38.2* 36.1*   Creatinine mg/dL 3.99*  --   --  3.30* 2.87*   Albumin g/dL  --   --   --   --  3.4   Bilirubin Total mg/dL  --   --   --   --  1.5   ALP unit/L  --   --   --   --  70   AST unit/L  --   --   --   --  62*   ALT unit/L  --   --   --   --  29   Magnesium Level mg/dL 1.50*  --   --  2.20 2.50   Phosphorus Level mg/dL  --   --   --  3.5 3.0       Vancomycin Administrations:  vancomycin given in the last 96 hours                     vancomycin 1,500 mg in D5W 250 mL IVPB (admixture device) (mg) 1,500 mg New Bag 09/23/24 4880                    Microbiologic Results:  Microbiology Results (last 7 days)       Procedure Component Value Units Date/Time    Blood culture x two cultures. Draw prior to antibiotics. [[id number]] Collected: 09/23/24 1744    Order Status: Resulted Specimen: Blood from Arm, Left Updated: 09/23/24 1800    Blood culture x two cultures. Draw prior to antibiotics. [[id number]] Collected: 09/23/24 1744    Order Status: Resulted Specimen: Blood from Forearm, Left Updated: 09/23/24 1800

## 2024-09-24 NOTE — PROGRESS NOTES
"Pharmacokinetic Initial Assessment: IV Vancomycin    Assessment/Plan:    Initiate intravenous vancomycin with loading dose of 1500 mg once with subsequent doses when random concentrations are less than 20 mcg/mL  Desired empiric serum trough concentration is 15 to 20 mcg/mL  Draw vancomycin random level on 9/24 at 0900.  Pharmacy will continue to follow and monitor vancomycin.      Please contact pharmacy at extension [phone] with any questions regarding this assessment.     Thank you for the consult,   Lucien Godoy       Patient brief summary:  Gabe Barnett is a 59 y.o. male initiated on antimicrobial therapy with IV Vancomycin for treatment of suspected sepsis    Drug Allergies:   Review of patient's allergies indicates:   Allergen Reactions    Niacin Other (See Comments)     I get flushed and burn up       Actual Body Weight:   99.8 kg    Renal Function:   Estimated Creatinine Clearance: 23.7 mL/min (A) (based on SCr of 3.99 mg/dL (H)).,     CBC (last 72 hours):  Recent Labs   Lab Result Units 09/23/24  1549   WBC x10(3)/mcL 15.50*   Hgb g/dL 16.3   Hct % 46.0   Platelet x10(3)/mcL 282   Mono % % 5.3   Eos % % 0.2   Basophil % % 0.1       Metabolic Panel (last 72 hours):  Recent Labs   Lab Result Units 09/23/24  1549 09/23/24  1745   Sodium mmol/L 131*  --    Potassium mmol/L 2.7*  --    Chloride mmol/L 79*  --    CO2 mmol/L 33*  --    Glucose mg/dL 104  --    Glucose, UA   --  Normal   Blood Urea Nitrogen mg/dL 34.7*  --    Creatinine mg/dL 3.99*  --    Magnesium Level mg/dL 1.50*  --        Drug levels (last 3 results):  No results for input(s): "VANCOMYCINRA", "VANCORANDOM", "VANCOMYCINPE", "VANCOPEAK", "VANCOMYCINTR", "VANCOTROUGH" in the last 72 hours.    Microbiologic Results:  Microbiology Results (last 7 days)       Procedure Component Value Units Date/Time    Blood culture x two cultures. Draw prior to antibiotics. [[id number]] Collected: 09/23/24 5755    Order Status: Sent Specimen: Blood from Arm, Left " Updated: 09/23/24 1800    Blood culture x two cultures. Draw prior to antibiotics. [[id number]] Collected: 09/23/24 7184    Order Status: Sent Specimen: Blood from Forearm, Left Updated: 09/23/24 1800

## 2024-09-24 NOTE — CARE UPDATE
Patient started on regular adult diet.  Without nausea and vomiting.      Senthil Alfonso MD, MBS  [facility] Resident, [facility]

## 2024-09-24 NOTE — PROGRESS NOTES
Pharmacist Renal Dose Adjustment Note    Gabe Barnett is a 59 y.o. male being treated with the medication zosyn    Patient Data:    Vital Signs (Most Recent):  Temp: 98 °F (36.7 °C) (09/24/24 0708)  Pulse: 105 (09/24/24 0708)  Resp: 18 (09/24/24 0708)  BP: 96/70 (09/24/24 0708)  SpO2: 95 % (09/24/24 0708) Vital Signs (72h Range):  Temp:  [97.9 °F (36.6 °C)-98.5 °F (36.9 °C)]   Pulse:  []   Resp:  [15-25]   BP: ()/(36-79)   SpO2:  [91 %-98 %]      Recent Labs   Lab 09/23/24  1549 09/23/24  2159 09/24/24  0352   CREATININE 3.99* 3.30* 2.87*     Serum creatinine: 2.87 mg/dL (H) 09/24/24 0352  Estimated creatinine clearance: 34.2 mL/min (A)    Medication:zosyn dose: 4.5gm frequency q12h will be changed to medication:zosyn dose:4.5gm frequency:q8h    Pharmacist's Name: America Gilbert  Pharmacist's Extension: [phone]

## 2024-09-24 NOTE — PLAN OF CARE
09/24/24 1504   Discharge Assessment   Assessment Type Discharge Planning Assessment   Confirmed/corrected address, phone number and insurance Yes   Confirmed Demographics Correct on Facesheet   Source of Information patient   When was your last doctors appointment?   (No PCP)   Reason For Admission Alcohol withdrawl, NSTEMI, NOE   People in Home alone   Facility Arrived From: home   Do you expect to return to your current living situation? Yes   Do you have help at home or someone to help you manage your care at home? No   Prior to hospitilization cognitive status: Unable to Assess   Current cognitive status: Alert/Oriented   Walking or Climbing Stairs Difficulty no   Dressing/Bathing Difficulty no   Equipment Currently Used at Home none   Readmission within 30 days? No   Patient currently being followed by outpatient case management? No   Do you currently have service(s) that help you manage your care at home? No   Do you take prescription medications? No   Do you have prescription coverage? Yes   Coverage BCBS   Who is going to help you get home at discharge? [facility]   Are you on dialysis? No   Do you take coumadin? No   Discharge Plan A Home   Discharge Plan B Rehab   Discharge Plan discussed with: Patient   Physical Activity   On average, how many days per week do you engage in moderate to strenuous exercise (like a brisk walk)? 0 days   On average, how many minutes do you engage in exercise at this level? 0 min   Financial Resource Strain   How hard is it for you to pay for the very basics like food, housing, medical care, and heating? Not hard   Housing Stability   In the last 12 months, was there a time when you were not able to pay the mortgage or rent on time? N   At any time in the past 12 months, were you homeless or living in a shelter (including now)? N   Transportation Needs   Has the lack of transportation kept you from medical appointments, meetings, work or from getting things needed for daily  living? No   Food Insecurity   Within the past 12 months, you worried that your food would run out before you got the money to buy more. Never true   Within the past 12 months, the food you bought just didn't last and you didn't have money to get more. Never true   Stress   Do you feel stress - tense, restless, nervous, or anxious, or unable to sleep at night because your mind is troubled all the time - these days? Not at all   Social Isolation   How often do you feel lonely or isolated from those around you?  Never   Alcohol Use   Q1: How often do you have a drink containing alcohol? 4 or more ti   Q2: How many drinks containing alcohol do you have on a typical day when you are drinking? 5 or 6   Q3: How often do you have six or more drinks on one occasion? Daily   Utilities   In the past 12 months has the electric, gas, oil, or water company threatened to shut off services in your home? No   Health Literacy   How often do you need to have someone help you when you read instructions, pamphlets, or other written material from your doctor or pharmacy? Never

## 2024-09-24 NOTE — H&P
[facility] Medicine Wards History & Physical Note     Resident Team: [facility] Medicine List 3  Attending Physician: Prabhjot Castro MD  Resident: Jarod Gomez    Date of Admit: 9/23/2024    Chief Complaint     Emesis (N/V x 4 days due to ETOH detoxing x 4 days at hotel Denies SI, denies HI, denies hallucinations. AASI also reports Afib on monitor, Cardizem 20mg and 1000ml NS given.  PTO. )     Subjective:      History of Present Illness:  Gabe Barnett is a 59 y.o. male with a past medical history of  hypertension, TIA, and alcohol abuse who presented to the ED on 9/23/2024  with a primary complaint of alcohol withdrawal.  Patient reports that he has been drinking 1 qt of whiskey per day for many years now.  He states that he often binge drinks for a period of several days with intermittent periods this cessation in between.  He reports that his last drink was approximately 4 days ago.  Since, patient reports nausea, vomiting, mild abdominal pain, tremors, and insomnia.     In route to the ED, EMS reported that patient was in AFib with RVR and administered 20 mg of Cardizem and 4 mg of Zofran.  Patient with elevated heart rate on examination fluctuating in the 110s to 130s.  Labs significant for leukocytosis of 15.5, hypokalemia 2.7, alkalosis with a serum bicarb of 33, elevated creatinine, hypomagnesemia at 0.5.  BNP mildly elevated at 106.  Troponin elevated at 0.228.  Chest x-ray fairly unremarkable.  UA with no overt signs of UTI.  Internal medicine was consulted for admission.      Past Medical History:  No past medical history on file.    Past Surgical History:  No past surgical history on file.    Family History:  No family history on file.    Social History:       Allergies:  Review of patient's allergies indicates:   Allergen Reactions    Niacin Other (See Comments)     I get flushed and burn up       Home Medications:  Prior to Admission medications    Not on File         Review of Systems:  Review of  "Systems   Constitutional:  Negative for chills, fever and malaise/fatigue.   Respiratory:  Negative for cough, hemoptysis, sputum production and shortness of breath.    Cardiovascular:  Positive for palpitations. Negative for chest pain and leg swelling.   Gastrointestinal:  Positive for abdominal pain, nausea and vomiting. Negative for constipation and diarrhea.   Genitourinary:  Negative for dysuria, frequency and urgency.   Neurological:  Positive for tremors. Negative for focal weakness, seizures and loss of consciousness.   Psychiatric/Behavioral:  Positive for substance abuse. The patient is nervous/anxious and has insomnia.        Objective:   Last 24 Hour Vital Signs:  BP  Min: 70/36  Max: 117/79  Temp  Av.9 °F (36.6 °C)  Min: 97.9 °F (36.6 °C)  Max: 97.9 °F (36.6 °C)  Pulse  Av.2  Min: 57  Max: 126  Resp  Av.5  Min: 15  Max: 25  SpO2  Av.4 %  Min: 92 %  Max: 98 %  Height  Av' 11" (180.3 cm)  Min: 5' 11" (180.3 cm)  Max: 5' 11" (180.3 cm)  Weight  Av.8 kg (220 lb)  Min: 99.8 kg (220 lb)  Max: 99.8 kg (220 lb)  Body mass index is 30.68 kg/m².  I/O last 3 completed shifts:  In: 1000 [I.V.:1000]  Out: -     Physical Exam  Vitals reviewed.   Constitutional:       General: He is not in acute distress.  HENT:      Head:      Comments: Head/neck flushing  Cardiovascular:      Rate and Rhythm: Tachycardia present. Rhythm irregular.      Heart sounds: No murmur heard.     No friction rub. No gallop.   Pulmonary:      Breath sounds: Normal breath sounds. No wheezing, rhonchi or rales.   Abdominal:      General: There is no distension.      Tenderness: There is no abdominal tenderness.   Musculoskeletal:      Right lower leg: No edema.      Left lower leg: No edema.   Neurological:      General: No focal deficit present.      Mental Status: He is alert and oriented to person, place, and time.      Comments: No tremor       Laboratory:  Most Recent Data:  CBC:   Lab Results   Component Value " "Date    WBC 15.50 (H) 09/23/2024    HGB 16.3 09/23/2024    HCT 46.0 09/23/2024     09/23/2024    MCV 82.6 09/23/2024    RDW 13.3 09/23/2024     WBC Differential:   Recent Labs   Lab 09/23/24  1549   WBC 15.50*   HGB 16.3   HCT 46.0      MCV 82.6     BMP:   Lab Results   Component Value Date     (L) 09/23/2024    K 2.7 (LL) 09/23/2024    CL 79 (L) 09/23/2024    CO2 33 (H) 09/23/2024    BUN 34.7 (H) 09/23/2024    CREATININE 3.99 (H) 09/23/2024    CALCIUM 9.1 09/23/2024    MG 1.50 (L) 09/23/2024     LFTs: No results found for: "PROT", "ALBUMIN", "BILITOT", "AST", "ALKPHOS", "ALT", "GGT"  Coags: No results found for: "INR", "PROTIME", "PTT"  FLP: No results found for: "CHOL", "HDL", "LDLCALC", "TRIG", "CHOLHDL"  DM:   Lab Results   Component Value Date    CREATININE 3.99 (H) 09/23/2024     Thyroid: No results found for: "TSH", "FREET4", "X6VLFVB", "W0GNWZZ", "THYROIDAB"  Anemia: No results found for: "IRON", "TIBC", "FERRITIN", "JMMRLOIC84", "FOLATE"  Cardiac:   Lab Results   Component Value Date    TROPONINI 0.228 (H) 09/23/2024    .7 (H) 09/23/2024     Urinalysis:   Lab Results   Component Value Date    COLORU Yellow 09/23/2024    NITRITE Negative 09/23/2024    UROBILINOGEN Normal 09/23/2024    WBCUA 0-5 09/23/2024       Trended Lab Data:  Recent Labs   Lab 09/23/24  1549   WBC 15.50*   HGB 16.3   HCT 46.0      MCV 82.6   RDW 13.3   *   K 2.7*   CL 79*   CO2 33*   BUN 34.7*   CREATININE 3.99*       Trended Cardiac Data:  Recent Labs   Lab 09/23/24  1549   TROPONINI 0.228*   .7*       Radiology:  Imaging Results              X-Ray Chest AP Portable (Final result)  Result time 09/23/24 16:43:12      Final result by Seamus Nichols MD (09/23/24 16:43:12)                   Impression:      No acute cardiopulmonary process identified.      Electronically signed by: Seamus Nichols  Date:    09/23/2024  Time:    16:43               Narrative:    EXAMINATION:  XR CHEST AP " PORTABLE    CLINICAL HISTORY:  hypotension;    TECHNIQUE:  One view    COMPARISON:  None available.    FINDINGS:  Cardiopericardial silhouette is within normal limits.  No acute dense focal or segmental consolidation, congestive process, pleural effusions or pneumothorax.                        Wet Read by James Olson DO (09/23/24 16:41:10, [facility], Emergency Medicine)    No dense lobar consolidation or pneumothorax.                                    Assessment & Plan:     Acute Alcohol Withdrawal   - patient with long-term history of binge drinking.  Reports last drink was 4 days ago  - initiate CIWA protocol; Ativan 2 mg IV for CIWA greater than 8  - start Librium taper  - continue folic acid and thiamine supplementation  - obtain UDS    NOE  Volume depletion  AGMA  Hypokalemic, hypochloremic metabolic alkalosis secondary to nausea/vomiting  Hypomagnesemia   - patient with noted nausea/vomiting with alcohol withdrawal  - continue with IV fluid resuscitation; patient started on LR at 125 mL/hr.  Received 3 L IVF in ED.  - BUN/creatinine on admit 34.7/3.99; expect to improve with continued volume resuscitation  - continue to follow and replete electrolytes as needed  - urine sodium pending  - complete anion gap metabolic acidosis workup for completeness given patient's history of alcoholism    AFib with RVR  NSTEMI, likely type 2  - AFib likely secondary to volume depletion  - Lopressor p.r.n. for HR > 130 sustained  - can add Toprol 12.5 mg if more optimal rate control as needed  - initial troponin 0.228; continue to trend q.6 hours with EKG  - consider cardiology consult if needed moving forward    SIRS 3/4 on admit  - patient's SIRS positive on admission but without identifiable source of infection at this time  - leukocytosis likely reactive secondary to alcohol withdrawal  - tachycardia related to AFib from volume depletion  - blood cultures pending  - continue vancomycin and  Zosyn (D1); low threshold for deescalation    CODE STATUS: Full  Access: PIV  Antibiotics: None  Diet: Full  DVT Prophylaxis: SCDs  GI Prophylaxis: NA  Fluids: LR at 125 mL/hr    Disposition:  Patient admitted to the Internal Medicine Service.  Further disposition pending upcoming hospitalization course.    Jarod Gomez DO  Internal Medicine HO-2

## 2024-09-24 NOTE — PROGRESS NOTES
[facility] Medicine Wards Progress Note     Resident Team: [facility] Medicine List 3  Attending Physician: Prabhjot Castro MD  Resident: Jarod Gomez    Subjective:      Brief HPI:  Gabe Barnett is a 59 y.o. male with a past medical history of  hypertension, TIA, and alcohol abuse who presented to the ED on 2024  with a primary complaint of alcohol withdrawal.  Patient reports that he has been drinking 1 qt of whiskey per day for many years now.  He states that he often binge drinks for a period of several days with intermittent periods this cessation in between.  He reports that his last drink was approximately 4 days ago.  Since, patient reports nausea, vomiting, mild abdominal pain, tremors, and insomnia.      In route to the ED, EMS reported that patient was in AFib with RVR and administered 20 mg of Cardizem and 4 mg of Zofran.  Patient with elevated heart rate on examination fluctuating in the 110s to 130s.  Labs significant for leukocytosis of 15.5, hypokalemia 2.7, alkalosis with a serum bicarb of 33, elevated creatinine, hypomagnesemia at 0.5.  BNP mildly elevated at 106.  Troponin elevated at 0.228.  Chest x-ray fairly unremarkable.  UA with no overt signs of UTI.  Internal medicine was consulted for admission.    Interval History:  No acute events overnight.  Patient does continue to complain of hiccups and some acid reflux symptoms.  He received 3 GI cocktails yesterday afternoon/last night.  We will start famotidine.  Renal indices improving with creatinine now at 2.87.  Continue IVF.  Continue CIWA protocol.  Continue Librium taper.  Continue to follow and replete electrolytes as needed.    Review of Systems:  ROS completed and negative except as indicated above.     Objective:     Last 24 Hour Vital Signs:  BP  Min: 70/36  Max: 117/79  Temp  Av.1 °F (36.7 °C)  Min: 97.9 °F (36.6 °C)  Max: 98.5 °F (36.9 °C)  Pulse  Av.6  Min: 57  Max: 126  Resp  Av.5  Min: 15  Max: 25  SpO2  Av.2 %   "Min: 91 %  Max: 98 %  Height  Av' 11" (180.3 cm)  Min: 5' 11" (180.3 cm)  Max: 5' 11" (180.3 cm)  Weight  Av.9 kg (229 lb 0.8 oz)  Min: 99.8 kg (220 lb)  Max: 108 kg (238 lb 1.6 oz)  I/O last 3 completed shifts:  In: 2843.7 [I.V.:2020.8; IV Piggyback:822.9]  Out: -     Physical Examination:  Physical Exam  Vitals reviewed.   Constitutional:       General: He is not in acute distress.  Cardiovascular:      Rate and Rhythm: Tachycardia present. Rhythm irregular.      Heart sounds: No murmur heard.     No friction rub. No gallop.   Pulmonary:      Breath sounds: Normal breath sounds. No wheezing, rhonchi or rales.   Abdominal:      General: There is no distension.      Palpations: Abdomen is soft.      Tenderness: There is no abdominal tenderness.   Musculoskeletal:      Right lower leg: No edema.      Left lower leg: No edema.   Neurological:      General: No focal deficit present.      Mental Status: He is alert and oriented to person, place, and time.   Psychiatric:         Mood and Affect: Mood normal.         Behavior: Behavior normal.       Laboratory:  Most Recent Data:  CBC:   Lab Results   Component Value Date    WBC 8.32 2024    HGB 13.7 (L) 2024    HCT 40.6 (L) 2024     2024    MCV 85.7 2024    RDW 13.7 2024     WBC Differential:   Recent Labs   Lab 24  1549 24  0352   WBC 15.50* 8.32   HGB 16.3 13.7*   HCT 46.0 40.6*    189   MCV 82.6 85.7     BMP:   Lab Results   Component Value Date     2024    K 3.7 2024    CL 94 (L) 2024    CO2 33 (H) 2024    BUN 36.1 (H) 2024    CREATININE 2.87 (H) 2024    CALCIUM 8.5 (L) 2024    MG 2.50 2024    PHOS 3.0 2024     LFTs:   Lab Results   Component Value Date    ALBUMIN 3.4 2024    BILITOT 1.5 2024    AST 62 (H) 2024    ALKPHOS 70 2024    ALT 29 2024     Coags: No results found for: "INR", "PROTIME", "PTT"  FLP: " "No results found for: "CHOL", "HDL", "LDLCALC", "TRIG", "CHOLHDL"  DM:   Lab Results   Component Value Date    CREATININE 2.87 (H) 09/24/2024     Thyroid: No results found for: "TSH", "FREET4", "A2WJJHP", "Q6KAPKJ", "THYROIDAB"  Anemia: No results found for: "IRON", "TIBC", "FERRITIN", "KJAHEICM34", "FOLATE"  Cardiac:   Lab Results   Component Value Date    TROPONINI 0.317 (H) 09/24/2024    .7 (H) 09/23/2024     Radiology:  Imaging Results              X-Ray Chest AP Portable (Final result)  Result time 09/23/24 16:43:12      Final result by Seamus Nichols MD (09/23/24 16:43:12)                   Impression:      No acute cardiopulmonary process identified.      Electronically signed by: Seamus Nichols  Date:    09/23/2024  Time:    16:43               Narrative:    EXAMINATION:  XR CHEST AP PORTABLE    CLINICAL HISTORY:  hypotension;    TECHNIQUE:  One view    COMPARISON:  None available.    FINDINGS:  Cardiopericardial silhouette is within normal limits.  No acute dense focal or segmental consolidation, congestive process, pleural effusions or pneumothorax.                        Wet Read by James Olson DO (09/23/24 16:41:10, [facility], Emergency Medicine)    No dense lobar consolidation or pneumothorax.                                    Current Medications:     Infusions:   0.9% NaCl   Intravenous Continuous 125 mL/hr at 09/24/24 0310 New Bag at 09/24/24 0310    NORepinephrine bitartrate-D5W  0-3 mcg/kg/min Intravenous Continuous            Scheduled:   chlordiazepoxide  50 mg Oral Q6H    Followed by    [START ON 9/25/2024] chlordiazepoxide  50 mg Oral Q8H    Followed by    [START ON 9/26/2024] chlordiazepoxide  25 mg Oral Q6H    Followed by    [START ON 9/27/2024] chlordiazepoxide  25 mg Oral Q8H    Followed by    [START ON 9/28/2024] chlordiazepoxide  25 mg Oral Q12H    Followed by    [START ON 9/29/2024] chlordiazepoxide  25 mg Oral Q24H    famotidine  20 mg Oral Daily    " folic acid  1 mg Oral Daily    metoprolol succinate  12.5 mg Oral BID    piperacillin-tazobactam (Zosyn) IV (PEDS and ADULTS) (extended infusion is not appropriate)  4.5 g Intravenous Q8H    sodium chloride 0.9%  30 mL/kg (Ideal) Intravenous Once    thiamine  100 mg Oral Daily    vancomycin (VANCOCIN) IV (PEDS and ADULTS)  1,250 mg Intravenous Once        PRN:    Current Facility-Administered Medications:     lorazepam, 2 mg, Intravenous, Q2H PRN    melatonin, 6 mg, Oral, Nightly PRN    metoprolol, 5 mg, Intravenous, Q5 Min PRN    sodium chloride 0.9%, 10 mL, Intravenous, PRN    vancomycin - pharmacy to dose, , Intravenous, pharmacy to manage frequency      Assessment & Plan:     Acute Alcohol Withdrawal   - patient with long-term history of binge drinking.  Reports last drink was 4 days ago  - initiate CIWA protocol; Ativan 2 mg IV for CIWA greater than 8  - continue Librium taper  - continue folic acid and thiamine supplementation  - UDS negative  - will have CM speak to patient about rehab     NOE, improving  Volume depletion  Hypokalemic hypochloremic metabolic alkalosis secondary to nausea/vomiting, improving  Hypomagnesemia   - patient with noted nausea/vomiting with alcohol withdrawal  - continue with IV fluid resuscitation; patient started on NS at 125 mL/hr.  Received 3 L IVF in ED.  - BUN/creatinine on admit 34.7/3.99; improving on a.m. labs  - continue to follow and replete electrolytes as needed     AFib with RVR  NSTEMI, likely type 2  - AFib likely secondary to volume depletion  - Lopressor p.r.n. for HR > 130 sustained  - start Toprol 12.5 mg b.i.d., increase if needed  - CV 3; plan to start DOAC  - troponin has peaked at 0.390.  No complaint of chest pain.  Can consider stress test moving forward.     SIRS 3/4 on admit  - patient's SIRS positive on admission but without identifiable source of infection at this time  - leukocytosis likely reactive secondary to alcohol withdrawal, resolved  -  tachycardia related to AFib from volume depletion  - blood cultures pending  - continue vancomycin and Zosyn (D1); low threshold for deescalation with BC negative at 24 hrs     CODE STATUS: Full  Access: PIV  Antibiotics: None  Diet: Full  DVT Prophylaxis: SCDs- starting Eliquis  GI Prophylaxis: NA  Fluids: NS at 125 mL/hr    Jarod Gomez, DO  Internal Medicine HO-2

## 2024-09-24 NOTE — PROGRESS NOTES
Inpatient Nutrition Evaluation    Admit Date: 9/23/2024   Total duration of encounter: 1 day   Patient Age: 59 y.o.    Nutrition Recommendation/Prescription     Low Na diet  Monitor Weight Weekly   Continue Thiamine, Folic acid d/t ETOH abuse    Nutrition Assessment     Chart Review    Reason Seen: malnutrition screening tool (MST)    Malnutrition Screening Tool Results   Have you recently lost weight without trying?: Yes: 14-23 lbs  Have you been eating poorly because of a decreased appetite?: Yes   MST Score: 3   Diagnosis:  Acute alcohol withdrawal, NOE, Volume depletion, AGMA, Hypokalemic, Hypomagnesemia, Afib, NSTEMI    Relevant Medical History: HTN, TIA, Alcohol absue    Scheduled Medications:  chlordiazepoxide, 50 mg, Q6H   Followed by  [START ON 9/25/2024] chlordiazepoxide, 50 mg, Q8H   Followed by  [START ON 9/26/2024] chlordiazepoxide, 25 mg, Q6H   Followed by  [START ON 9/27/2024] chlordiazepoxide, 25 mg, Q8H   Followed by  [START ON 9/28/2024] chlordiazepoxide, 25 mg, Q12H   Followed by  [START ON 9/29/2024] chlordiazepoxide, 25 mg, Q24H  famotidine, 20 mg, Daily  folic acid, 1 mg, Daily  metoprolol succinate, 12.5 mg, BID  piperacillin-tazobactam (Zosyn) IV (PEDS and ADULTS) (extended infusion is not appropriate), 4.5 g, Q8H  sodium chloride 0.9%, 30 mL/kg (Ideal), Once  thiamine, 100 mg, Daily  vancomycin (VANCOCIN) IV (PEDS and ADULTS), 1,250 mg, Once    Continuous Infusions:  0.9% NaCl, Last Rate: 125 mL/hr at 09/24/24 0310  NORepinephrine bitartrate-D5W    PRN Medications:   Current Facility-Administered Medications:     lorazepam, 2 mg, Intravenous, Q2H PRN    melatonin, 6 mg, Oral, Nightly PRN    metoprolol, 5 mg, Intravenous, Q5 Min PRN    sodium chloride 0.9%, 10 mL, Intravenous, PRN    vancomycin - pharmacy to dose, , Intravenous, pharmacy to manage frequency    Recent Labs   Lab 09/23/24  1549 09/23/24  1744 09/23/24  2159 09/24/24  0352   *  --  134* 136   K 2.7*  --  2.8* 3.7   CALCIUM  "9.1  --  7.9* 8.5*   PHOS  --   --  3.5 3.0   MG 1.50*  --  2.20 2.50   CO2 33*  --  33* 33*   BUN 34.7*  --  38.2* 36.1*   CREATININE 3.99*  --  3.30* 2.87*   EGFRNORACEVR 16  --  21 24   GLUCOSE 104  --  131* 86   BILITOT  --   --   --  1.5   ALKPHOS  --   --   --  70   ALT  --   --   --  29   AST  --   --   --  62*   ALBUMIN  --   --   --  3.4   LIPASE  --  54  --   --    WBC 15.50*  --   --  8.32   HGB 16.3  --   --  13.7*   HCT 46.0  --   --  40.6*     Nutrition Orders:  Diet Adult Regular      Appetite/Oral Intake: poor/25-50% of meals  Factors Affecting Nutritional Intake: decreased appetite, excessive alcohol intake, and nausea  Social Needs Impacting Access to Food: none identified  Food/Hoahaoism/Cultural Preferences: none reported  Food Allergies: no known food allergies  Last Bowel Movement: 24  Wound(s):  skin intact    Comments    24 -- Pt reports decreased appetite over the last 5 days related to n/v d/t alcohol detox; no vomiting this am, complains of indigestion - pepcid ordered; weight obtained via bed scale, no indication of wt loss noted per pt's reported UBW of "230's"    Anthropometrics    Height: 5' 11" (180.3 cm), Height Method: Stated  Last Weight: 107 kg (236 lb) (24 1140), Weight Method: Bed Scale  BMI (Calculated): 32.9  BMI Classification: obese grade I (BMI 30-34.9)        Ideal Body Weight (IBW), Male: 172 lb     % Ideal Body Weight, Male (lb): 138.43 %                 Usual Body Weight (UBW), k.8 kg  % Usual Body Weight: 100.44  % Weight Change From Usual Weight: 0.23 %  Usual Weight Provided By: patient    Wt Readings from Last 5 Encounters:   24 107 kg (236 lb)     Weight Change(s) Since Admission:   Wt Readings from Last 1 Encounters:   24 1140 107 kg (236 lb)   24 108 kg (238 lb 1.6 oz)   24 1544 99.8 kg (220 lb)   Admit Weight: 99.8 kg (220 lb) (24 1544), Weight Method: Stated    Patient Education     Not " applicable.    Nutrition Goals & Monitoring     Dietitian will monitor: food and beverage intake, weight, electrolyte/renal panel, and gastrointestinal profile  Discharge planning: continue Regular diet  Nutrition Risk/Follow-Up: low (follow-up in 5-7 days)  Patients assigned 'low nutrition risk' status do not qualify for a full nutritional assessment but will be monitored and re-evaluated in a 5-7 day time period. Please consult if re-evaluation needed sooner.

## 2024-09-25 LAB
ALBUMIN SERPL-MCNC: 3.2 G/DL (ref 3.4–4.8)
ALBUMIN/GLOB SERPL: 1.1 RATIO (ref 1.1–2)
ALP SERPL-CCNC: 63 UNIT/L (ref 40–150)
ALT SERPL-CCNC: 35 UNIT/L (ref 0–55)
ANION GAP SERPL CALC-SCNC: 8 MEQ/L
AST SERPL-CCNC: 42 UNIT/L (ref 5–34)
BASOPHILS # BLD AUTO: 0.06 X10(3)/MCL
BASOPHILS NFR BLD AUTO: 0.8 %
BILIRUB SERPL-MCNC: 0.7 MG/DL
BUN SERPL-MCNC: 28 MG/DL (ref 8.4–25.7)
CALCIUM SERPL-MCNC: 9.2 MG/DL (ref 8.8–10)
CHLORIDE SERPL-SCNC: 101 MMOL/L (ref 98–107)
CHOLEST SERPL-MCNC: 172 MG/DL
CHOLEST/HDLC SERPL: 4 {RATIO} (ref 0–5)
CO2 SERPL-SCNC: 33 MMOL/L (ref 23–31)
CREAT SERPL-MCNC: 1.66 MG/DL (ref 0.73–1.18)
CREAT/UREA NIT SERPL: 17
EOSINOPHIL # BLD AUTO: 0.32 X10(3)/MCL (ref 0–0.9)
EOSINOPHIL NFR BLD AUTO: 4.4 %
ERYTHROCYTE [DISTWIDTH] IN BLOOD BY AUTOMATED COUNT: 13.8 % (ref 11.5–17)
EST. AVERAGE GLUCOSE BLD GHB EST-MCNC: 96.8 MG/DL
GFR SERPLBLD CREATININE-BSD FMLA CKD-EPI: 47 ML/MIN/1.73/M2
GLOBULIN SER-MCNC: 2.9 GM/DL (ref 2.4–3.5)
GLUCOSE SERPL-MCNC: 95 MG/DL (ref 82–115)
HBA1C MFR BLD: 5 %
HCT VFR BLD AUTO: 41.3 % (ref 42–52)
HDLC SERPL-MCNC: 48 MG/DL (ref 35–60)
HGB BLD-MCNC: 13.6 G/DL (ref 14–18)
HOLD SPECIMEN: NORMAL
IMM GRANULOCYTES # BLD AUTO: 0.03 X10(3)/MCL (ref 0–0.04)
IMM GRANULOCYTES NFR BLD AUTO: 0.4 %
LDLC SERPL CALC-MCNC: 97 MG/DL (ref 50–140)
LYMPHOCYTES # BLD AUTO: 2.46 X10(3)/MCL (ref 0.6–4.6)
LYMPHOCYTES NFR BLD AUTO: 33.6 %
MAGNESIUM SERPL-MCNC: 2.3 MG/DL (ref 1.6–2.6)
MCH RBC QN AUTO: 29.1 PG (ref 27–31)
MCHC RBC AUTO-ENTMCNC: 32.9 G/DL (ref 33–36)
MCV RBC AUTO: 88.4 FL (ref 80–94)
MONOCYTES # BLD AUTO: 0.47 X10(3)/MCL (ref 0.1–1.3)
MONOCYTES NFR BLD AUTO: 6.4 %
NEUTROPHILS # BLD AUTO: 3.99 X10(3)/MCL (ref 2.1–9.2)
NEUTROPHILS NFR BLD AUTO: 54.4 %
NRBC BLD AUTO-RTO: 0 %
PHOSPHATE SERPL-MCNC: 2.7 MG/DL (ref 2.3–4.7)
PLATELET # BLD AUTO: 183 X10(3)/MCL (ref 130–400)
PMV BLD AUTO: 9.7 FL (ref 7.4–10.4)
POTASSIUM SERPL-SCNC: 3.3 MMOL/L (ref 3.5–5.1)
PROT SERPL-MCNC: 6.1 GM/DL (ref 5.8–7.6)
RBC # BLD AUTO: 4.67 X10(6)/MCL (ref 4.7–6.1)
SODIUM SERPL-SCNC: 142 MMOL/L (ref 136–145)
TRIGL SERPL-MCNC: 135 MG/DL (ref 34–140)
TSH SERPL-ACNC: 0.36 UIU/ML (ref 0.35–4.94)
VLDLC SERPL CALC-MCNC: 27 MG/DL
WBC # BLD AUTO: 7.33 X10(3)/MCL (ref 4.5–11.5)

## 2024-09-25 PROCEDURE — 83036 HEMOGLOBIN GLYCOSYLATED A1C: CPT

## 2024-09-25 PROCEDURE — 84100 ASSAY OF PHOSPHORUS: CPT

## 2024-09-25 PROCEDURE — 94761 N-INVAS EAR/PLS OXIMETRY MLT: CPT

## 2024-09-25 PROCEDURE — 85025 COMPLETE CBC W/AUTO DIFF WBC: CPT

## 2024-09-25 PROCEDURE — 63600175 PHARM REV CODE 636 W HCPCS

## 2024-09-25 PROCEDURE — 80061 LIPID PANEL: CPT

## 2024-09-25 PROCEDURE — 83735 ASSAY OF MAGNESIUM: CPT

## 2024-09-25 PROCEDURE — 25000003 PHARM REV CODE 250

## 2024-09-25 PROCEDURE — 80053 COMPREHEN METABOLIC PANEL: CPT

## 2024-09-25 PROCEDURE — 36415 COLL VENOUS BLD VENIPUNCTURE: CPT

## 2024-09-25 PROCEDURE — 84443 ASSAY THYROID STIM HORMONE: CPT

## 2024-09-25 PROCEDURE — 21400001 HC TELEMETRY ROOM

## 2024-09-25 RX ORDER — POTASSIUM CHLORIDE 20 MEQ/1
40 TABLET, EXTENDED RELEASE ORAL ONCE
Status: COMPLETED | OUTPATIENT
Start: 2024-09-25 | End: 2024-09-25

## 2024-09-25 RX ADMIN — Medication 6 MG: at 08:09

## 2024-09-25 RX ADMIN — SODIUM CHLORIDE: 9 INJECTION, SOLUTION INTRAVENOUS at 03:09

## 2024-09-25 RX ADMIN — FOLIC ACID 1 MG: 1 TABLET ORAL at 09:09

## 2024-09-25 RX ADMIN — FAMOTIDINE 20 MG: 20 TABLET ORAL at 09:09

## 2024-09-25 RX ADMIN — CHLORDIAZEPOXIDE HYDROCHLORIDE 50 MG: 25 CAPSULE ORAL at 09:09

## 2024-09-25 RX ADMIN — METOPROLOL SUCCINATE 12.5 MG: 25 TABLET, EXTENDED RELEASE ORAL at 09:09

## 2024-09-25 RX ADMIN — METOPROLOL TARTRATE 5 MG: 1 INJECTION, SOLUTION INTRAVENOUS at 11:09

## 2024-09-25 RX ADMIN — CHLORDIAZEPOXIDE HYDROCHLORIDE 50 MG: 25 CAPSULE ORAL at 06:09

## 2024-09-25 RX ADMIN — METOPROLOL SUCCINATE 12.5 MG: 25 TABLET, EXTENDED RELEASE ORAL at 08:09

## 2024-09-25 RX ADMIN — THIAMINE HCL TAB 100 MG 100 MG: 100 TAB at 09:09

## 2024-09-25 RX ADMIN — APIXABAN 5 MG: 2.5 TABLET, FILM COATED ORAL at 08:09

## 2024-09-25 RX ADMIN — CHLORDIAZEPOXIDE HYDROCHLORIDE 50 MG: 25 CAPSULE ORAL at 01:09

## 2024-09-25 RX ADMIN — PIPERACILLIN AND TAZOBACTAM 4.5 G: 4; .5 INJECTION, POWDER, LYOPHILIZED, FOR SOLUTION INTRAVENOUS; PARENTERAL at 01:09

## 2024-09-25 RX ADMIN — SODIUM CHLORIDE: 9 INJECTION, SOLUTION INTRAVENOUS at 05:09

## 2024-09-25 RX ADMIN — POTASSIUM CHLORIDE 40 MEQ: 1500 TABLET, EXTENDED RELEASE ORAL at 09:09

## 2024-09-25 RX ADMIN — APIXABAN 5 MG: 2.5 TABLET, FILM COATED ORAL at 09:09

## 2024-09-25 NOTE — PROGRESS NOTES
[facility] Medicine Wards Progress Note     Resident Team: [facility] Medicine List 3  Attending Physician: Prabhjot Castro MD  Resident: Jarod Gomez    Subjective:      Brief HPI:  Gabe Barnett is a 59 y.o. male with a past medical history of  hypertension, TIA, and alcohol abuse who presented to the ED on 2024  with a primary complaint of alcohol withdrawal.  Patient reports that he has been drinking 1 qt of whiskey per day for many years now.  He states that he often binge drinks for a period of several days with intermittent periods this cessation in between.  He reports that his last drink was approximately 4 days ago.  Since, patient reports nausea, vomiting, mild abdominal pain, tremors, and insomnia.      In route to the ED, EMS reported that patient was in AFib with RVR and administered 20 mg of Cardizem and 4 mg of Zofran.  Patient with elevated heart rate on examination fluctuating in the 110s to 130s.  Labs significant for leukocytosis of 15.5, hypokalemia 2.7, alkalosis with a serum bicarb of 33, elevated creatinine, hypomagnesemia at 0.5.  BNP mildly elevated at 106.  Troponin elevated at 0.228.  Chest x-ray fairly unremarkable.  UA with no overt signs of UTI.  Internal medicine was consulted for admission.    Interval History:  No acute events overnight.  Renal indices continue to improve.  Repleted potassium this morning.  Blood cultures negative at 24 hours, discontinued antibiotics.  Discussed risks versus benefits of anticoagulation with patient.  Patient agreeable with initiation of anticoagulation for AFib.  Patient started on Eliquis.  Now the patient was more stable, we will pursue nuclear stress test.    Review of Systems:  ROS completed and negative except as indicated above.     Objective:     Last 24 Hour Vital Signs:  BP  Min: 103/70  Max: 162/111  Temp  Av.9 °F (36.6 °C)  Min: 97.5 °F (36.4 °C)  Max: 98.2 °F (36.8 °C)  Pulse  Av.3  Min: 58  Max: 122  Resp  Av.2  Min: 17   "Max: 20  SpO2  Av.3 %  Min: 94 %  Max: 98 %  I/O last 3 completed shifts:  In: 5932.4 [P.O.:1197; I.V.:3612.5; IV Piggyback:1122.9]  Out: -     Physical Examination:  Physical Exam  Vitals reviewed.   Constitutional:       General: He is not in acute distress.  Cardiovascular:      Rate and Rhythm: Tachycardia present. Rhythm irregular.      Heart sounds: No murmur heard.     No friction rub. No gallop.   Pulmonary:      Breath sounds: Normal breath sounds. No wheezing, rhonchi or rales.   Abdominal:      General: There is no distension.      Palpations: Abdomen is soft.      Tenderness: There is no abdominal tenderness.   Musculoskeletal:      Right lower leg: No edema.      Left lower leg: No edema.   Neurological:      General: No focal deficit present.      Mental Status: He is alert and oriented to person, place, and time.   Psychiatric:         Mood and Affect: Mood normal.         Behavior: Behavior normal.       Laboratory:  Most Recent Data:  CBC:   Lab Results   Component Value Date    WBC 7.33 2024    HGB 13.6 (L) 2024    HCT 41.3 (L) 2024     2024    MCV 88.4 2024    RDW 13.8 2024     WBC Differential:   Recent Labs   Lab 24  1549 24  0352 24  0449   WBC 15.50* 8.32 7.33   HGB 16.3 13.7* 13.6*   HCT 46.0 40.6* 41.3*    189 183   MCV 82.6 85.7 88.4     BMP:   Lab Results   Component Value Date     2024    K 3.3 (L) 2024     2024    CO2 33 (H) 2024    BUN 28.0 (H) 2024    CREATININE 1.66 (H) 2024    CALCIUM 9.2 2024    MG 2.30 2024    PHOS 2.7 2024     LFTs:   Lab Results   Component Value Date    ALBUMIN 3.2 (L) 2024    BILITOT 0.7 2024    AST 42 (H) 2024    ALKPHOS 63 2024    ALT 35 2024     Coags: No results found for: "INR", "PROTIME", "PTT"  FLP:   Lab Results   Component Value Date    CHOL 172 2024    HDL 48 2024    TRIG " "135 09/25/2024     DM:   Lab Results   Component Value Date    HGBA1C 5.0 09/25/2024    CREATININE 1.66 (H) 09/25/2024     Thyroid: No results found for: "TSH", "FREET4", "I4QFUZG", "M2ZRMTJ", "THYROIDAB"  Anemia: No results found for: "IRON", "TIBC", "FERRITIN", "VWURMYED91", "FOLATE"  Cardiac:   Lab Results   Component Value Date    TROPONINI 0.317 (H) 09/24/2024    .7 (H) 09/23/2024     Radiology:  Imaging Results              X-Ray Chest AP Portable (Final result)  Result time 09/23/24 16:43:12      Final result by Seamus Nichols MD (09/23/24 16:43:12)                   Impression:      No acute cardiopulmonary process identified.      Electronically signed by: Seamus Nichols  Date:    09/23/2024  Time:    16:43               Narrative:    EXAMINATION:  XR CHEST AP PORTABLE    CLINICAL HISTORY:  hypotension;    TECHNIQUE:  One view    COMPARISON:  None available.    FINDINGS:  Cardiopericardial silhouette is within normal limits.  No acute dense focal or segmental consolidation, congestive process, pleural effusions or pneumothorax.                        Wet Read by James Olson DO (09/23/24 16:41:10, [facility], Emergency Medicine)    No dense lobar consolidation or pneumothorax.                                    Current Medications:     Infusions:   0.9% NaCl   Intravenous Continuous 125 mL/hr at 09/25/24 0545 New Bag at 09/25/24 0545        Scheduled:   apixaban  5 mg Oral BID    chlordiazepoxide  50 mg Oral Q8H    Followed by    [START ON 9/26/2024] chlordiazepoxide  25 mg Oral Q6H    Followed by    [START ON 9/27/2024] chlordiazepoxide  25 mg Oral Q8H    Followed by    [START ON 9/28/2024] chlordiazepoxide  25 mg Oral Q12H    Followed by    [START ON 9/29/2024] chlordiazepoxide  25 mg Oral Q24H    famotidine  20 mg Oral Daily    folic acid  1 mg Oral Daily    metoprolol succinate  12.5 mg Oral BID    sodium chloride 0.9%  30 mL/kg (Ideal) Intravenous Once    thiamine  " 100 mg Oral Daily        PRN:    Current Facility-Administered Medications:     lorazepam, 2 mg, Intravenous, Q2H PRN    melatonin, 6 mg, Oral, Nightly PRN    metoprolol, 5 mg, Intravenous, Q5 Min PRN    sodium chloride 0.9%, 10 mL, Intravenous, PRN      Assessment & Plan:     Acute Alcohol Withdrawal   - patient with long-term history of binge drinking.  Reports last drink was 4 days ago  - initiate CIWA protocol; Ativan 2 mg IV for CIWA greater than 8  - continue Librium taper  - continue folic acid and thiamine supplementation  - UDS negative  - will have CM speak to patient about rehab     NOE, improving  Volume depletion  Hypokalemic hypochloremic metabolic alkalosis secondary to nausea/vomiting, improving  Hypomagnesemia, resolved   - patient with noted nausea/vomiting with alcohol withdrawal  - continue with IV fluid resuscitation with NS given alkalosis  - BUN/creatinine on admit 34.7/3.99; improving on a.m. labs once again  - continue to follow and replete electrolytes as needed     AFib with RVR  NSTEMI, likely type 2  - AFib likely secondary to volume depletion  - Lopressor p.r.n. for HR > 130 sustained  - continue Toprol 12.5 mg b.i.d., increase if needed  - CV 3; continue Eliquis  - TSH ordered  - troponin has peaked at 0.390.  Pursue nuclear stress test tomorrow.  The patient NPO     SIRS 3/4 on admit  - patient's SIRS positive on admission but without identifiable source of infection at this time  - leukocytosis likely reactive secondary to alcohol withdrawal, resolved  - blood cultures negative at 24 hours  - vancomycin and Zosyn discontinued     CODE STATUS: Full  Access: PIV  Antibiotics: None  Diet: Full  DVT Prophylaxis: SCDs- starting Eliquis  GI Prophylaxis: NA  Fluids: NS at 125 mL/hr    Jarod Gomez, DO  Internal Medicine HO-2

## 2024-09-25 NOTE — PROGRESS NOTES
VANCOMYCIN DOSING BY PHARMACY DISCONTINUATION NOTE    Gabe Barnett is a 59 y.o. male who had been consulted for vancomycin dosing.    The pharmacy consult for vancomycin dosing has been discontinued.     Vancomycin Dosing by Pharmacy Consult will sign-off. Please reconsult if necessary. Thank you for allowing us to participate in this patient's care.     Malika NolanD

## 2024-09-26 LAB
ALBUMIN SERPL-MCNC: 2.9 G/DL (ref 3.4–4.8)
ALBUMIN/GLOB SERPL: 1.1 RATIO (ref 1.1–2)
ALP SERPL-CCNC: 54 UNIT/L (ref 40–150)
ALT SERPL-CCNC: 32 UNIT/L (ref 0–55)
ANION GAP SERPL CALC-SCNC: 6 MEQ/L
AST SERPL-CCNC: 26 UNIT/L (ref 5–34)
BASOPHILS # BLD AUTO: 0.06 X10(3)/MCL
BASOPHILS NFR BLD AUTO: 1 %
BILIRUB SERPL-MCNC: 0.4 MG/DL
BUN SERPL-MCNC: 19.9 MG/DL (ref 8.4–25.7)
CALCIUM SERPL-MCNC: 9.2 MG/DL (ref 8.8–10)
CHLORIDE SERPL-SCNC: 105 MMOL/L (ref 98–107)
CO2 SERPL-SCNC: 30 MMOL/L (ref 23–31)
CREAT SERPL-MCNC: 1.29 MG/DL (ref 0.73–1.18)
CREAT/UREA NIT SERPL: 15
CV STRESS BASE HR: 117 BPM
DIASTOLIC BLOOD PRESSURE: 98 MMHG
EOSINOPHIL # BLD AUTO: 0.45 X10(3)/MCL (ref 0–0.9)
EOSINOPHIL NFR BLD AUTO: 7.9 %
ERYTHROCYTE [DISTWIDTH] IN BLOOD BY AUTOMATED COUNT: 13.6 % (ref 11.5–17)
ETHYLENE GLYCOL SERPLBLD-MCNC: NOT DETECTED MG/DL
GFR SERPLBLD CREATININE-BSD FMLA CKD-EPI: >60 ML/MIN/1.73/M2
GLOBULIN SER-MCNC: 2.6 GM/DL (ref 2.4–3.5)
GLUCOSE SERPL-MCNC: 99 MG/DL (ref 82–115)
HCT VFR BLD AUTO: 37.6 % (ref 42–52)
HGB BLD-MCNC: 12.2 G/DL (ref 14–18)
HIV 1+2 AB+HIV1 P24 AG SERPL QL IA: NONREACTIVE
IMM GRANULOCYTES # BLD AUTO: 0.02 X10(3)/MCL (ref 0–0.04)
IMM GRANULOCYTES NFR BLD AUTO: 0.3 %
LYMPHOCYTES # BLD AUTO: 2.17 X10(3)/MCL (ref 0.6–4.6)
LYMPHOCYTES NFR BLD AUTO: 37.9 %
MAGNESIUM SERPL-MCNC: 1.8 MG/DL (ref 1.6–2.6)
MCH RBC QN AUTO: 28.6 PG (ref 27–31)
MCHC RBC AUTO-ENTMCNC: 32.4 G/DL (ref 33–36)
MCV RBC AUTO: 88.1 FL (ref 80–94)
MONOCYTES # BLD AUTO: 0.39 X10(3)/MCL (ref 0.1–1.3)
MONOCYTES NFR BLD AUTO: 6.8 %
NEUTROPHILS # BLD AUTO: 2.64 X10(3)/MCL (ref 2.1–9.2)
NEUTROPHILS NFR BLD AUTO: 46.1 %
NRBC BLD AUTO-RTO: 0 %
NUC REST EJECTION FRACTION: 49
NUC STRESS EJECTION FRACTION: 49 %
OHS CV CPX 85 PERCENT MAX PREDICTED HEART RATE MALE: 136
OHS CV CPX ESTIMATED METS: 1
OHS CV CPX MAX PREDICTED HEART RATE: 160
OHS CV CPX PATIENT IS FEMALE: 0
OHS CV CPX PATIENT IS MALE: 1
OHS CV CPX PEAK DIASTOLIC BLOOD PRESSURE: 133 MMHG
OHS CV CPX PEAK HEAR RATE: 155 BPM
OHS CV CPX PEAK RATE PRESSURE PRODUCT: NORMAL
OHS CV CPX PEAK SYSTOLIC BLOOD PRESSURE: 252 MMHG
OHS CV CPX PERCENT MAX PREDICTED HEART RATE ACHIEVED: 97
OHS CV CPX RATE PRESSURE PRODUCT PRESENTING: NORMAL
OHS CV INITIAL DOSE: 32.1 MCG/KG/MIN
OHS CV PEAK DOSE: 10.7 MCG/KG/MIN
PHOSPHATE SERPL-MCNC: 5.2 MG/DL (ref 2.3–4.7)
PLATELET # BLD AUTO: 160 X10(3)/MCL (ref 130–400)
PMV BLD AUTO: 10 FL (ref 7.4–10.4)
POTASSIUM SERPL-SCNC: 3.9 MMOL/L (ref 3.5–5.1)
PROT SERPL-MCNC: 5.5 GM/DL (ref 5.8–7.6)
RBC # BLD AUTO: 4.27 X10(6)/MCL (ref 4.7–6.1)
SODIUM SERPL-SCNC: 141 MMOL/L (ref 136–145)
STRESS ECHO POST EXERCISE DUR MIN: 0 MINUTES
STRESS ECHO POST EXERCISE DUR SEC: 47 SECONDS
SYSTOLIC BLOOD PRESSURE: 208 MMHG
T PALLIDUM AB SER QL: NONREACTIVE
WBC # BLD AUTO: 5.73 X10(3)/MCL (ref 4.5–11.5)

## 2024-09-26 PROCEDURE — 25000003 PHARM REV CODE 250

## 2024-09-26 PROCEDURE — 94761 N-INVAS EAR/PLS OXIMETRY MLT: CPT

## 2024-09-26 PROCEDURE — 87389 HIV-1 AG W/HIV-1&-2 AB AG IA: CPT

## 2024-09-26 PROCEDURE — 83735 ASSAY OF MAGNESIUM: CPT

## 2024-09-26 PROCEDURE — 63600175 PHARM REV CODE 636 W HCPCS: Performed by: INTERNAL MEDICINE

## 2024-09-26 PROCEDURE — 84100 ASSAY OF PHOSPHORUS: CPT

## 2024-09-26 PROCEDURE — 36415 COLL VENOUS BLD VENIPUNCTURE: CPT

## 2024-09-26 PROCEDURE — 80053 COMPREHEN METABOLIC PANEL: CPT

## 2024-09-26 PROCEDURE — 85025 COMPLETE CBC W/AUTO DIFF WBC: CPT

## 2024-09-26 PROCEDURE — 86780 TREPONEMA PALLIDUM: CPT

## 2024-09-26 PROCEDURE — 21400001 HC TELEMETRY ROOM

## 2024-09-26 PROCEDURE — A9502 TC99M TETROFOSMIN: HCPCS | Performed by: INTERNAL MEDICINE

## 2024-09-26 RX ORDER — CALCIUM CARBONATE 200(500)MG
500 TABLET,CHEWABLE ORAL DAILY PRN
Status: DISCONTINUED | OUTPATIENT
Start: 2024-09-26 | End: 2024-09-28 | Stop reason: HOSPADM

## 2024-09-26 RX ORDER — LABETALOL HCL 20 MG/4 ML
10 SYRINGE (ML) INTRAVENOUS EVERY 4 HOURS PRN
Status: DISCONTINUED | OUTPATIENT
Start: 2024-09-26 | End: 2024-09-28 | Stop reason: HOSPADM

## 2024-09-26 RX ORDER — LABETALOL HCL 20 MG/4 ML
10 SYRINGE (ML) INTRAVENOUS EVERY 4 HOURS PRN
Status: DISCONTINUED | OUTPATIENT
Start: 2024-09-26 | End: 2024-09-26

## 2024-09-26 RX ORDER — ACETAMINOPHEN 325 MG/1
650 TABLET ORAL ONCE
Status: COMPLETED | OUTPATIENT
Start: 2024-09-26 | End: 2024-09-26

## 2024-09-26 RX ORDER — HYDRALAZINE HYDROCHLORIDE 20 MG/ML
10 INJECTION INTRAMUSCULAR; INTRAVENOUS EVERY 4 HOURS PRN
Status: DISCONTINUED | OUTPATIENT
Start: 2024-09-26 | End: 2024-09-26

## 2024-09-26 RX ORDER — HYDRALAZINE HYDROCHLORIDE 20 MG/ML
10 INJECTION INTRAMUSCULAR; INTRAVENOUS EVERY 4 HOURS PRN
Status: DISCONTINUED | OUTPATIENT
Start: 2024-09-26 | End: 2024-09-28 | Stop reason: HOSPADM

## 2024-09-26 RX ORDER — METOPROLOL TARTRATE 1 MG/ML
5 INJECTION, SOLUTION INTRAVENOUS ONCE
Status: COMPLETED | OUTPATIENT
Start: 2024-09-26 | End: 2024-09-26

## 2024-09-26 RX ORDER — METOPROLOL SUCCINATE 25 MG/1
25 TABLET, EXTENDED RELEASE ORAL 2 TIMES DAILY
Status: DISCONTINUED | OUTPATIENT
Start: 2024-09-26 | End: 2024-09-28 | Stop reason: HOSPADM

## 2024-09-26 RX ORDER — REGADENOSON 0.08 MG/ML
0.4 INJECTION, SOLUTION INTRAVENOUS ONCE
Status: COMPLETED | OUTPATIENT
Start: 2024-09-26 | End: 2024-09-26

## 2024-09-26 RX ADMIN — METOPROLOL SUCCINATE 25 MG: 25 TABLET, EXTENDED RELEASE ORAL at 09:09

## 2024-09-26 RX ADMIN — CHLORDIAZEPOXIDE HYDROCHLORIDE 25 MG: 25 CAPSULE ORAL at 09:09

## 2024-09-26 RX ADMIN — APIXABAN 5 MG: 2.5 TABLET, FILM COATED ORAL at 08:09

## 2024-09-26 RX ADMIN — METOPROLOL TARTRATE 5 MG: 1 INJECTION, SOLUTION INTRAVENOUS at 01:09

## 2024-09-26 RX ADMIN — CHLORDIAZEPOXIDE HYDROCHLORIDE 25 MG: 25 CAPSULE ORAL at 02:09

## 2024-09-26 RX ADMIN — TETROFOSMIN 32.1 MILLICURIE: 1.38 INJECTION, POWDER, LYOPHILIZED, FOR SOLUTION INTRAVENOUS at 12:09

## 2024-09-26 RX ADMIN — THIAMINE HCL TAB 100 MG 100 MG: 100 TAB at 08:09

## 2024-09-26 RX ADMIN — METOPROLOL SUCCINATE 25 MG: 25 TABLET, EXTENDED RELEASE ORAL at 02:09

## 2024-09-26 RX ADMIN — FOLIC ACID 1 MG: 1 TABLET ORAL at 08:09

## 2024-09-26 RX ADMIN — APIXABAN 5 MG: 2.5 TABLET, FILM COATED ORAL at 09:09

## 2024-09-26 RX ADMIN — REGADENOSON 0.4 MG: 0.08 INJECTION, SOLUTION INTRAVENOUS at 12:09

## 2024-09-26 RX ADMIN — Medication 6 MG: at 09:09

## 2024-09-26 RX ADMIN — FAMOTIDINE 20 MG: 20 TABLET ORAL at 08:09

## 2024-09-26 RX ADMIN — SODIUM CHLORIDE: 9 INJECTION, SOLUTION INTRAVENOUS at 10:09

## 2024-09-26 RX ADMIN — CHLORDIAZEPOXIDE HYDROCHLORIDE 25 MG: 25 CAPSULE ORAL at 08:09

## 2024-09-26 RX ADMIN — ACETAMINOPHEN 650 MG: 325 TABLET ORAL at 11:09

## 2024-09-26 RX ADMIN — TETROFOSMIN 10.7 MILLICURIE: 1.38 INJECTION, POWDER, LYOPHILIZED, FOR SOLUTION INTRAVENOUS at 12:09

## 2024-09-26 RX ADMIN — CHLORDIAZEPOXIDE HYDROCHLORIDE 25 MG: 25 CAPSULE ORAL at 04:09

## 2024-09-26 NOTE — PROGRESS NOTES
[facility] Medicine Wards Progress Note     Resident Team: [facility] Medicine List 3  Attending Physician: Prabhjot Castro MD  Resident: Jarod Gomez    Subjective:      Brief HPI:  Gabe Barnett is a 59 y.o. male with a past medical history of  hypertension, TIA, and alcohol abuse who presented to the ED on 2024  with a primary complaint of alcohol withdrawal.  Patient reports that he has been drinking 1 qt of whiskey per day for many years now.  He states that he often binge drinks for a period of several days with intermittent periods this cessation in between.  He reports that his last drink was approximately 4 days ago.  Since, patient reports nausea, vomiting, mild abdominal pain, tremors, and insomnia.      In route to the ED, EMS reported that patient was in AFib with RVR and administered 20 mg of Cardizem and 4 mg of Zofran.  Patient with elevated heart rate on examination fluctuating in the 110s to 130s.  Labs significant for leukocytosis of 15.5, hypokalemia 2.7, alkalosis with a serum bicarb of 33, elevated creatinine, hypomagnesemia at 0.5.  BNP mildly elevated at 106.  Troponin elevated at 0.228.  Chest x-ray fairly unremarkable.  UA with no overt signs of UTI.  Internal medicine was consulted for admission.    Interval History:  NAEON, remains in AF RVR overnight with max HR in the 170s, increasing metoprolol to 25 BID. Renal indices improving. Luiza scan today.       Review of Systems:  ROS completed and negative except as indicated above.     Objective:     Last 24 Hour Vital Signs:  BP  Min: 138/99  Max: 177/106  Temp  Av.2 °F (36.8 °C)  Min: 97.7 °F (36.5 °C)  Max: 98.6 °F (37 °C)  Pulse  Av.7  Min: 85  Max: 176  Resp  Av.5  Min: 18  Max: 20  SpO2  Av.2 %  Min: 95 %  Max: 98 %  I/O last 3 completed shifts:  In: 2891.7 [I.V.:2591.7; IV Piggyback:300]  Out: -     Physical Examination:  Physical Exam  Vitals reviewed.   Constitutional:       General: He is not in acute  "distress.  Cardiovascular:      Rate and Rhythm: Tachycardia present. Rhythm irregular.      Heart sounds: No murmur heard.     No friction rub. No gallop.   Pulmonary:      Breath sounds: Normal breath sounds. No wheezing, rhonchi or rales.   Abdominal:      General: There is no distension.      Palpations: Abdomen is soft.      Tenderness: There is no abdominal tenderness.   Musculoskeletal:      Right lower leg: No edema.      Left lower leg: No edema.   Neurological:      General: No focal deficit present.      Mental Status: He is alert and oriented to person, place, and time.   Psychiatric:         Mood and Affect: Mood normal.         Behavior: Behavior normal.       Laboratory:  Most Recent Data:  CBC:   Lab Results   Component Value Date    WBC 5.73 09/26/2024    HGB 12.2 (L) 09/26/2024    HCT 37.6 (L) 09/26/2024     09/26/2024    MCV 88.1 09/26/2024    RDW 13.6 09/26/2024     WBC Differential:   Recent Labs   Lab 09/23/24  1549 09/24/24  0352 09/25/24  0449 09/26/24  0417   WBC 15.50* 8.32 7.33 5.73   HGB 16.3 13.7* 13.6* 12.2*   HCT 46.0 40.6* 41.3* 37.6*    189 183 160   MCV 82.6 85.7 88.4 88.1     BMP:   Lab Results   Component Value Date     09/26/2024    K 3.9 09/26/2024     09/26/2024    CO2 30 09/26/2024    BUN 19.9 09/26/2024    CREATININE 1.29 (H) 09/26/2024    CALCIUM 9.2 09/26/2024    MG 1.80 09/26/2024    PHOS 5.2 (H) 09/26/2024     LFTs:   Lab Results   Component Value Date    ALBUMIN 2.9 (L) 09/26/2024    BILITOT 0.4 09/26/2024    AST 26 09/26/2024    ALKPHOS 54 09/26/2024    ALT 32 09/26/2024     Coags: No results found for: "INR", "PROTIME", "PTT"  FLP:   Lab Results   Component Value Date    CHOL 172 09/25/2024    HDL 48 09/25/2024    TRIG 135 09/25/2024     DM:   Lab Results   Component Value Date    HGBA1C 5.0 09/25/2024    CREATININE 1.29 (H) 09/26/2024     Thyroid:   Lab Results   Component Value Date    TSH 0.362 09/25/2024     Anemia: No results found for: " ""IRON", "TIBC", "FERRITIN", "AAUURPUN39", "FOLATE"  Cardiac:   Lab Results   Component Value Date    TROPONINI 0.317 (H) 09/24/2024    .7 (H) 09/23/2024     Radiology:  Imaging Results              X-Ray Chest AP Portable (Final result)  Result time 09/23/24 16:43:12      Final result by Seamus Nichols MD (09/23/24 16:43:12)                   Impression:      No acute cardiopulmonary process identified.      Electronically signed by: Seamus Nichols  Date:    09/23/2024  Time:    16:43               Narrative:    EXAMINATION:  XR CHEST AP PORTABLE    CLINICAL HISTORY:  hypotension;    TECHNIQUE:  One view    COMPARISON:  None available.    FINDINGS:  Cardiopericardial silhouette is within normal limits.  No acute dense focal or segmental consolidation, congestive process, pleural effusions or pneumothorax.                        Wet Read by James Olson DO (09/23/24 16:41:10, [facility], Emergency Medicine)    No dense lobar consolidation or pneumothorax.                                    Current Medications:     Infusions:   0.9% NaCl   Intravenous Continuous 100 mL/hr at 09/26/24 1051 New Bag at 09/26/24 1051        Scheduled:   acetaminophen  650 mg Oral Once    apixaban  5 mg Oral BID    chlordiazepoxide  25 mg Oral Q6H    Followed by    [START ON 9/27/2024] chlordiazepoxide  25 mg Oral Q8H    Followed by    [START ON 9/28/2024] chlordiazepoxide  25 mg Oral Q12H    Followed by    [START ON 9/29/2024] chlordiazepoxide  25 mg Oral Q24H    famotidine  20 mg Oral Daily    folic acid  1 mg Oral Daily    metoprolol succinate  25 mg Oral BID    sodium chloride 0.9%  30 mL/kg (Ideal) Intravenous Once    thiamine  100 mg Oral Daily        PRN:    Current Facility-Administered Medications:     lorazepam, 2 mg, Intravenous, Q2H PRN    melatonin, 6 mg, Oral, Nightly PRN    metoprolol, 5 mg, Intravenous, Q5 Min PRN    sodium chloride 0.9%, 10 mL, Intravenous, PRN      Assessment & Plan: "     Acute Alcohol Withdrawal   - patient with long-term history of binge drinking.  Reports last drink was 4 days ago  - initiate CIWA protocol; Ativan 2 mg IV for CIWA greater than 8  - continue Librium taper  - continue folic acid and thiamine supplementation  - UDS negative  - will have CM speak to patient about rehab     NOE, improving  Volume depletion  Hypokalemic hypochloremic metabolic alkalosis secondary to nausea/vomiting, improving  Hypomagnesemia, resolved   - patient with noted nausea/vomiting with alcohol withdrawal  - continue with IV fluid resuscitation with NS given alkalosis  - BUN/creatinine on admit 34.7/3.99; improving on a.m. labs once again  - continue to follow and replete electrolytes as needed     AFib with RVR  NSTEMI, likely type 2  - AFib likely secondary to volume depletion  - Lopressor p.r.n. for HR > 130 sustained  - Increasing Toprol 25 mg b.i.d., increase if needed  - CV 3; continue Eliquis  - TSH ordered  - troponin has peaked at 0.390.  Pursue nuclear stress test today     SIRS 3/4 on admit  - patient's SIRS positive on admission but without identifiable source of infection at this time  - leukocytosis likely reactive secondary to alcohol withdrawal, resolved  - blood cultures negative at 24 hours  - vancomycin and Zosyn discontinued     CODE STATUS: Full  Access: PIV  Antibiotics: None  Diet: Full  DVT Prophylaxis: SCDs- starting Eliquis  GI Prophylaxis: NA  Fluids: NS at 125 mL/hr    Yaw Chau DO  Internal Medicine HO-2

## 2024-09-27 PROBLEM — F10.939 ALCOHOL WITHDRAWAL: Status: ACTIVE | Noted: 2024-09-27

## 2024-09-27 LAB
ALBUMIN SERPL-MCNC: 3 G/DL (ref 3.4–4.8)
ALBUMIN/GLOB SERPL: 1 RATIO (ref 1.1–2)
ALP SERPL-CCNC: 52 UNIT/L (ref 40–150)
ALT SERPL-CCNC: 35 UNIT/L (ref 0–55)
ANION GAP SERPL CALC-SCNC: 7 MEQ/L
AST SERPL-CCNC: 25 UNIT/L (ref 5–34)
BASOPHILS # BLD AUTO: 0.06 X10(3)/MCL
BASOPHILS NFR BLD AUTO: 0.9 %
BILIRUB SERPL-MCNC: 0.4 MG/DL
BUN SERPL-MCNC: 17.7 MG/DL (ref 8.4–25.7)
CALCIUM SERPL-MCNC: 9.9 MG/DL (ref 8.8–10)
CHLORIDE SERPL-SCNC: 106 MMOL/L (ref 98–107)
CO2 SERPL-SCNC: 28 MMOL/L (ref 23–31)
CREAT SERPL-MCNC: 1.13 MG/DL (ref 0.73–1.18)
CREAT/UREA NIT SERPL: 16
EOSINOPHIL # BLD AUTO: 0.47 X10(3)/MCL (ref 0–0.9)
EOSINOPHIL NFR BLD AUTO: 7.2 %
ERYTHROCYTE [DISTWIDTH] IN BLOOD BY AUTOMATED COUNT: 13.7 % (ref 11.5–17)
GFR SERPLBLD CREATININE-BSD FMLA CKD-EPI: >60 ML/MIN/1.73/M2
GLOBULIN SER-MCNC: 2.9 GM/DL (ref 2.4–3.5)
GLUCOSE SERPL-MCNC: 102 MG/DL (ref 82–115)
HCT VFR BLD AUTO: 39.9 % (ref 42–52)
HGB BLD-MCNC: 13.1 G/DL (ref 14–18)
IMM GRANULOCYTES # BLD AUTO: 0.02 X10(3)/MCL (ref 0–0.04)
IMM GRANULOCYTES NFR BLD AUTO: 0.3 %
LYMPHOCYTES # BLD AUTO: 2.6 X10(3)/MCL (ref 0.6–4.6)
LYMPHOCYTES NFR BLD AUTO: 39.6 %
MAGNESIUM SERPL-MCNC: 1.8 MG/DL (ref 1.6–2.6)
MCH RBC QN AUTO: 29 PG (ref 27–31)
MCHC RBC AUTO-ENTMCNC: 32.8 G/DL (ref 33–36)
MCV RBC AUTO: 88.3 FL (ref 80–94)
MONOCYTES # BLD AUTO: 0.49 X10(3)/MCL (ref 0.1–1.3)
MONOCYTES NFR BLD AUTO: 7.5 %
NEUTROPHILS # BLD AUTO: 2.93 X10(3)/MCL (ref 2.1–9.2)
NEUTROPHILS NFR BLD AUTO: 44.5 %
NRBC BLD AUTO-RTO: 0 %
OHS QRS DURATION: 88 MS
OHS QRS DURATION: 90 MS
OHS QTC CALCULATION: 415 MS
OHS QTC CALCULATION: 493 MS
PHOSPHATE SERPL-MCNC: 5.1 MG/DL (ref 2.3–4.7)
PLATELET # BLD AUTO: 195 X10(3)/MCL (ref 130–400)
PMV BLD AUTO: 10.1 FL (ref 7.4–10.4)
POTASSIUM SERPL-SCNC: 4.2 MMOL/L (ref 3.5–5.1)
PROT SERPL-MCNC: 5.9 GM/DL (ref 5.8–7.6)
RBC # BLD AUTO: 4.52 X10(6)/MCL (ref 4.7–6.1)
SODIUM SERPL-SCNC: 141 MMOL/L (ref 136–145)
WBC # BLD AUTO: 6.57 X10(3)/MCL (ref 4.5–11.5)

## 2024-09-27 PROCEDURE — 94761 N-INVAS EAR/PLS OXIMETRY MLT: CPT

## 2024-09-27 PROCEDURE — 80053 COMPREHEN METABOLIC PANEL: CPT

## 2024-09-27 PROCEDURE — 36415 COLL VENOUS BLD VENIPUNCTURE: CPT

## 2024-09-27 PROCEDURE — 25000003 PHARM REV CODE 250

## 2024-09-27 PROCEDURE — 63600175 PHARM REV CODE 636 W HCPCS: Mod: JZ,JG

## 2024-09-27 PROCEDURE — 21400001 HC TELEMETRY ROOM

## 2024-09-27 PROCEDURE — 85025 COMPLETE CBC W/AUTO DIFF WBC: CPT

## 2024-09-27 PROCEDURE — 84100 ASSAY OF PHOSPHORUS: CPT

## 2024-09-27 PROCEDURE — 63600175 PHARM REV CODE 636 W HCPCS

## 2024-09-27 PROCEDURE — 83735 ASSAY OF MAGNESIUM: CPT

## 2024-09-27 PROCEDURE — 25000003 PHARM REV CODE 250: Performed by: INTERNAL MEDICINE

## 2024-09-27 RX ORDER — CHLORDIAZEPOXIDE HYDROCHLORIDE 25 MG/1
CAPSULE, GELATIN COATED ORAL
Qty: 3 CAPSULE | Refills: 0 | Status: SHIPPED | OUTPATIENT
Start: 2024-09-27 | End: 2024-09-29

## 2024-09-27 RX ORDER — LISINOPRIL 10 MG/1
10 TABLET ORAL DAILY
Qty: 90 TABLET | Refills: 3 | Status: SHIPPED | OUTPATIENT
Start: 2024-09-27 | End: 2025-09-27

## 2024-09-27 RX ORDER — LISINOPRIL 10 MG/1
10 TABLET ORAL DAILY
Qty: 30 TABLET | Refills: 1 | Status: SHIPPED | OUTPATIENT
Start: 2024-09-27 | End: 2025-09-27

## 2024-09-27 RX ORDER — LANOLIN ALCOHOL/MO/W.PET/CERES
100 CREAM (GRAM) TOPICAL DAILY
Qty: 30 TABLET | Refills: 1 | Status: SHIPPED | OUTPATIENT
Start: 2024-09-28

## 2024-09-27 RX ORDER — FOLIC ACID 1 MG/1
1 TABLET ORAL DAILY
Qty: 30 TABLET | Refills: 1 | Status: SHIPPED | OUTPATIENT
Start: 2024-09-28 | End: 2025-09-28

## 2024-09-27 RX ORDER — LISINOPRIL 10 MG/1
20 TABLET ORAL ONCE
Status: COMPLETED | OUTPATIENT
Start: 2024-09-27 | End: 2024-09-27

## 2024-09-27 RX ORDER — METOPROLOL SUCCINATE 25 MG/1
25 TABLET, EXTENDED RELEASE ORAL 2 TIMES DAILY
Qty: 60 TABLET | Refills: 1 | Status: SHIPPED | OUTPATIENT
Start: 2024-09-27 | End: 2025-09-27

## 2024-09-27 RX ORDER — LISINOPRIL 10 MG/1
40 TABLET ORAL DAILY
Status: DISCONTINUED | OUTPATIENT
Start: 2024-09-28 | End: 2024-09-28 | Stop reason: HOSPADM

## 2024-09-27 RX ORDER — HYDROCHLOROTHIAZIDE 12.5 MG/1
12.5 TABLET ORAL DAILY
Qty: 30 TABLET | Refills: 1 | Status: SHIPPED | OUTPATIENT
Start: 2024-09-28 | End: 2025-09-28

## 2024-09-27 RX ORDER — LISINOPRIL 10 MG/1
10 TABLET ORAL DAILY
Status: DISCONTINUED | OUTPATIENT
Start: 2024-09-27 | End: 2024-09-27

## 2024-09-27 RX ORDER — HYDROCHLOROTHIAZIDE 12.5 MG/1
12.5 TABLET ORAL DAILY
Status: DISCONTINUED | OUTPATIENT
Start: 2024-09-27 | End: 2024-09-27

## 2024-09-27 RX ORDER — LISINOPRIL 10 MG/1
20 TABLET ORAL DAILY
Status: DISCONTINUED | OUTPATIENT
Start: 2024-09-27 | End: 2024-09-27

## 2024-09-27 RX ADMIN — LISINOPRIL 20 MG: 10 TABLET ORAL at 06:09

## 2024-09-27 RX ADMIN — THIAMINE HCL TAB 100 MG 100 MG: 100 TAB at 09:09

## 2024-09-27 RX ADMIN — CHLORDIAZEPOXIDE HYDROCHLORIDE 25 MG: 25 CAPSULE ORAL at 02:09

## 2024-09-27 RX ADMIN — FOLIC ACID 1 MG: 1 TABLET ORAL at 09:09

## 2024-09-27 RX ADMIN — FAMOTIDINE 20 MG: 20 TABLET ORAL at 09:09

## 2024-09-27 RX ADMIN — SODIUM CHLORIDE: 9 INJECTION, SOLUTION INTRAVENOUS at 01:09

## 2024-09-27 RX ADMIN — METOPROLOL SUCCINATE 25 MG: 25 TABLET, EXTENDED RELEASE ORAL at 08:09

## 2024-09-27 RX ADMIN — LORAZEPAM 2 MG: 2 INJECTION INTRAMUSCULAR; INTRAVENOUS at 08:09

## 2024-09-27 RX ADMIN — APIXABAN 5 MG: 2.5 TABLET, FILM COATED ORAL at 08:09

## 2024-09-27 RX ADMIN — LABETALOL HYDROCHLORIDE 10 MG: 5 INJECTION, SOLUTION INTRAVENOUS at 11:09

## 2024-09-27 RX ADMIN — HYDROCHLOROTHIAZIDE 12.5 MG: 12.5 TABLET ORAL at 09:09

## 2024-09-27 RX ADMIN — CALCIUM CARBONATE (ANTACID) CHEW TAB 500 MG 500 MG: 500 CHEW TAB at 09:09

## 2024-09-27 RX ADMIN — LORAZEPAM 2 MG: 2 INJECTION INTRAMUSCULAR; INTRAVENOUS at 11:09

## 2024-09-27 RX ADMIN — CHLORDIAZEPOXIDE HYDROCHLORIDE 25 MG: 25 CAPSULE ORAL at 09:09

## 2024-09-27 RX ADMIN — APIXABAN 5 MG: 2.5 TABLET, FILM COATED ORAL at 09:09

## 2024-09-27 RX ADMIN — METOPROLOL SUCCINATE 25 MG: 25 TABLET, EXTENDED RELEASE ORAL at 09:09

## 2024-09-27 RX ADMIN — THERA TABS 1 TABLET: TAB at 09:09

## 2024-09-27 RX ADMIN — LISINOPRIL 10 MG: 10 TABLET ORAL at 02:09

## 2024-09-27 RX ADMIN — CHLORDIAZEPOXIDE HYDROCHLORIDE 25 MG: 25 CAPSULE ORAL at 06:09

## 2024-09-27 NOTE — PLAN OF CARE
Problem: Adult Inpatient Plan of Care  Goal: Plan of Care Review  Outcome: Adequate for Care Transition  Goal: Patient-Specific Goal (Individualized)  Outcome: Adequate for Care Transition  Goal: Absence of Hospital-Acquired Illness or Injury  Outcome: Adequate for Care Transition  Goal: Optimal Comfort and Wellbeing  Outcome: Adequate for Care Transition  Goal: Readiness for Transition of Care  Outcome: Adequate for Care Transition     Problem: Fall Injury Risk  Goal: Absence of Fall and Fall-Related Injury  Outcome: Adequate for Care Transition     Problem: Nausea and Vomiting  Goal: Nausea and Vomiting Relief  Outcome: Adequate for Care Transition

## 2024-09-27 NOTE — DISCHARGE SUMMARY
[facility] Internal Medicine Discharge Summary    Admitting Physician: Prabhjot Castro MD  Attending Physician: Prabhjot Castro MD  Date of Admit: 9/23/2024  Date of Discharge: 9/28/2024    Condition: Stable  Outcome: Condition has improved and patient is now ready for discharge.  DISPOSITION: Home or Self Care    Discharge Diagnoses     Patient Active Problem List   Diagnosis    Alcohol withdrawal       Principal Problem:  Alcohol withdrawal    Consultants and Procedures     Consultants:  IP CONSULT TO HOSPITAL MEDICINE    Procedures:   * No surgery found *     Brief Admission History      Gabe Barnett is a 59 y.o. male with a past medical history of  hypertension, TIA, and alcohol abuse who presented to the ED on 9/23/2024  with a primary complaint of alcohol withdrawal.  Patient reports that he has been drinking 1 qt of whiskey per day for many years now.  He states that he often binge drinks for a period of several days with intermittent periods this cessation in between.  He reports that his last drink was approximately 4 days ago.  Since, patient reports nausea, vomiting, mild abdominal pain, tremors, and insomnia.      In route to the ED, EMS reported that patient was in AFib with RVR and administered 20 mg of Cardizem and 4 mg of Zofran.  Patient with elevated heart rate on examination fluctuating in the 110s to 130s.  Labs significant for leukocytosis of 15.5, hypokalemia 2.7, alkalosis with a serum bicarb of 33, elevated creatinine, hypomagnesemia at 0.5.  BNP mildly elevated at 106.  Troponin elevated at 0.228.  Chest x-ray fairly unremarkable.  UA with no overt signs of UTI.  Internal medicine was consulted for admission.    Hospital Course with Pertinent Findings     Patient was admitted to the internal medicine service for acute alcohol withdrawal in conjunction with volume depletion secondary to nausea/vomiting with associated NOE.  Patient was started on CIWA protocol and Librium taper with gradual  "improvement in alcohol withdrawal symptoms.  Patient also with AFib with RVR on admission.  He was started on Toprol 12.5 mg b.i.d. which was increased to 25 mg b.i.d..  Also initiated on Eliquis as CV score was 3.  Risks versus benefits of anticoagulation discussed in detail with patient.  Due to troponin elevation on admission and patient reporting chest discomfort, patient underwent nuclear stress test which was unremarkable.  NOE resolved with aggressive IVF.  Patient will need [facility] post wards clinic and cardiology follow up outpatient.    Discharge physical exam:  Vitals  BP: (!) 137/95  Temp: 98.4 °F (36.9 °C)  Temp Source: Oral  Pulse: 79  Resp: 18  SpO2: 97 %  Height: 5' 11" (180.3 cm)  Weight: 107 kg (235 lb 14.3 oz)    Physical Exam  Vitals reviewed.   Constitutional:       General: He is not in acute distress.  Cardiovascular:      Rate and Rhythm: Normal rate. Rhythm irregular.      Pulses: Normal pulses.      Heart sounds: No murmur heard.     No friction rub. No gallop.   Pulmonary:      Breath sounds: Normal breath sounds. No wheezing, rhonchi or rales.   Abdominal:      General: There is no distension.      Palpations: Abdomen is soft.      Tenderness: There is no abdominal tenderness.   Musculoskeletal:      Right lower leg: No edema.      Left lower leg: No edema.   Neurological:      General: No focal deficit present.      Mental Status: He is alert and oriented to person, place, and time.         TIME SPENT ON DISCHARGE: 60 minutes    Discharge Medications        Medication List        START taking these medications      apixaban 5 mg Tab  Commonly known as: ELIQUIS  Take 1 tablet (5 mg total) by mouth 2 (two) times daily.     chlordiazepoxide 25 MG Cap  Commonly known as: LIBRIUM  Take 1 capsule (25 mg total) by mouth every 12 (twelve) hours for 1 day, THEN 1 capsule (25 mg total) once daily for 1 day.  Start taking on: September 27, 2024     folic acid 1 MG tablet  Commonly known as: " FOLVITE  Take 1 tablet (1 mg total) by mouth once daily.     hydroCHLOROthiazide 12.5 MG Tab  Commonly known as: HYDRODIURIL  Take 1 tablet (12.5 mg total) by mouth once daily.     * lisinopriL 10 MG tablet  Take 1 tablet (10 mg total) by mouth once daily.     * lisinopriL 10 MG tablet  Take 1 tablet (10 mg total) by mouth once daily.     * lisinopriL 40 MG tablet  Commonly known as: PRINIVIL,ZESTRIL  Take 1 tablet (40 mg total) by mouth once daily.     metoprolol succinate 25 MG 24 hr tablet  Commonly known as: TOPROL-XL  Take 1 tablet (25 mg total) by mouth 2 (two) times daily.     thiamine 100 MG tablet  Take 1 tablet (100 mg total) by mouth once daily.           * This list has 3 medication(s) that are the same as other medications prescribed for you. Read the directions carefully, and ask your doctor or other care provider to review them with you.                   Where to Get Your Medications        These medications were sent to [facility] - [address] [address]      Phone: [phone]   apixaban 5 mg Tab  chlordiazepoxide 25 MG Cap  folic acid 1 MG tablet  hydroCHLOROthiazide 12.5 MG Tab  lisinopriL 10 MG tablet  lisinopriL 10 MG tablet  lisinopriL 40 MG tablet  metoprolol succinate 25 MG 24 hr tablet  thiamine 100 MG tablet         Discharge Information:     - patient to continue taking/start taking medications as listed above.  Patient delivered medications via meds to bed. Continue with Lisinopril 40 mg daily.   - in-depth conversation had with patient about alcohol cessation and strict alcohol cessation while on remaining Librium taper.  Risks of drinking while taking benzodiazepines discussed in detail with patient.  He voiced understanding was able to repeat this back to me.  - patient will need to follow up with Cardiology outpatient  - patient to follow in [facility] post wards clinic in 1-2 weeks with JOEL Olivera  DO Jason  [facility] PGY-2

## 2024-09-27 NOTE — PROGRESS NOTES
[facility] Medicine Wards Progress Note     Resident Team: [facility] Medicine List 3  Attending Physician: Prabhjot Castro MD  Resident: Jarod Gomez    Subjective:      Brief HPI:  Gabe Barnett is a 59 y.o. male with a past medical history of  hypertension, TIA, and alcohol abuse who presented to the ED on 2024  with a primary complaint of alcohol withdrawal.  Patient reports that he has been drinking 1 qt of whiskey per day for many years now.  He states that he often binge drinks for a period of several days with intermittent periods this cessation in between.  He reports that his last drink was approximately 4 days ago.  Since, patient reports nausea, vomiting, mild abdominal pain, tremors, and insomnia.      In route to the ED, EMS reported that patient was in AFib with RVR and administered 20 mg of Cardizem and 4 mg of Zofran.  Patient with elevated heart rate on examination fluctuating in the 110s to 130s.  Labs significant for leukocytosis of 15.5, hypokalemia 2.7, alkalosis with a serum bicarb of 33, elevated creatinine, hypomagnesemia at 0.5.  BNP mildly elevated at 106.  Troponin elevated at 0.228.  Chest x-ray fairly unremarkable.  UA with no overt signs of UTI.  Internal medicine was consulted for admission.    Interval History:  No acute events overnight.  Blood pressure remains markedly elevated.  Patient also receiving 2 doses of Ativan during the day today with CIWA scale.  Plan for better blood pressure control.  Restart home lisinopril dose.  Anticipated discharge tomorrow.    Review of Systems:  ROS completed and negative except as indicated above.     Objective:     Last 24 Hour Vital Signs:  BP  Min: 144/82  Max: 181/83  Temp  Av.8 °F (36.6 °C)  Min: 97.4 °F (36.3 °C)  Max: 98.2 °F (36.8 °C)  Pulse  Av.9  Min: 61  Max: 117  Resp  Av.5  Min: 17  Max: 18  SpO2  Av.4 %  Min: 95 %  Max: 97 %  I/O last 3 completed shifts:  In: 236 [P.O.:236]  Out: -       Physical Exam  Vitals  "reviewed.   Constitutional:       General: He is not in acute distress.  Cardiovascular:      Rate and Rhythm: Normal rate. Rhythm irregular.      Heart sounds: No murmur heard.     No friction rub. No gallop.   Pulmonary:      Breath sounds: Normal breath sounds. No wheezing, rhonchi or rales.   Abdominal:      General: There is no distension.      Palpations: Abdomen is soft.      Tenderness: There is no abdominal tenderness.   Musculoskeletal:      Right lower leg: No edema.      Left lower leg: No edema.   Neurological:      General: No focal deficit present.      Mental Status: He is alert and oriented to person, place, and time.   Psychiatric:         Mood and Affect: Mood normal.         Behavior: Behavior normal.       Laboratory:  Most Recent Data:  CBC:   Lab Results   Component Value Date    WBC 6.57 09/27/2024    HGB 13.1 (L) 09/27/2024    HCT 39.9 (L) 09/27/2024     09/27/2024    MCV 88.3 09/27/2024    RDW 13.7 09/27/2024     WBC Differential:   Recent Labs   Lab 09/23/24  1549 09/24/24  0352 09/25/24  0449 09/26/24  0417 09/27/24  0406   WBC 15.50* 8.32 7.33 5.73 6.57   HGB 16.3 13.7* 13.6* 12.2* 13.1*   HCT 46.0 40.6* 41.3* 37.6* 39.9*    189 183 160 195   MCV 82.6 85.7 88.4 88.1 88.3     BMP:   Lab Results   Component Value Date     09/27/2024    K 4.2 09/27/2024     09/27/2024    CO2 28 09/27/2024    BUN 17.7 09/27/2024    CREATININE 1.13 09/27/2024    CALCIUM 9.9 09/27/2024    MG 1.80 09/27/2024    PHOS 5.1 (H) 09/27/2024     LFTs:   Lab Results   Component Value Date    ALBUMIN 3.0 (L) 09/27/2024    BILITOT 0.4 09/27/2024    AST 25 09/27/2024    ALKPHOS 52 09/27/2024    ALT 35 09/27/2024     Coags: No results found for: "INR", "PROTIME", "PTT"  FLP:   Lab Results   Component Value Date    CHOL 172 09/25/2024    HDL 48 09/25/2024    TRIG 135 09/25/2024     DM:   Lab Results   Component Value Date    HGBA1C 5.0 09/25/2024    CREATININE 1.13 09/27/2024     Thyroid:   Lab " "Results   Component Value Date    TSH 0.362 09/25/2024     Anemia: No results found for: "IRON", "TIBC", "FERRITIN", "ECSDDJXX19", "FOLATE"  Cardiac:   Lab Results   Component Value Date    TROPONINI 0.317 (H) 09/24/2024    .7 (H) 09/23/2024     Radiology:  Imaging Results              X-Ray Chest AP Portable (Final result)  Result time 09/23/24 16:43:12      Final result by Seamus Nichols MD (09/23/24 16:43:12)                   Impression:      No acute cardiopulmonary process identified.      Electronically signed by: Seamus Nichols  Date:    09/23/2024  Time:    16:43               Narrative:    EXAMINATION:  XR CHEST AP PORTABLE    CLINICAL HISTORY:  hypotension;    TECHNIQUE:  One view    COMPARISON:  None available.    FINDINGS:  Cardiopericardial silhouette is within normal limits.  No acute dense focal or segmental consolidation, congestive process, pleural effusions or pneumothorax.                        Wet Read by James Olson DO (09/23/24 16:41:10, [facility], Emergency Medicine)    No dense lobar consolidation or pneumothorax.                                    Current Medications:     Infusions:         Scheduled:   apixaban  5 mg Oral BID    chlordiazepoxide  25 mg Oral Q8H    Followed by    [START ON 9/28/2024] chlordiazepoxide  25 mg Oral Q12H    Followed by    [START ON 9/29/2024] chlordiazepoxide  25 mg Oral Q24H    famotidine  20 mg Oral Daily    folic acid  1 mg Oral Daily    lisinopriL  20 mg Oral Daily    [START ON 9/28/2024] lisinopriL  40 mg Oral Daily    metoprolol succinate  25 mg Oral BID    multivitamin  1 tablet Oral Daily    sodium chloride 0.9%  30 mL/kg (Ideal) Intravenous Once    thiamine  100 mg Oral Daily        PRN:    Current Facility-Administered Medications:     calcium carbonate, 500 mg, Oral, Daily PRN    hydrALAZINE, 10 mg, Intravenous, Q4H PRN    labetalol, 10 mg, Intravenous, Q4H PRN    lorazepam, 2 mg, Intravenous, Q2H PRN    " melatonin, 6 mg, Oral, Nightly PRN    metoprolol, 5 mg, Intravenous, Q5 Min PRN    sodium chloride 0.9%, 10 mL, Intravenous, PRN      Assessment & Plan:     Acute Alcohol Withdrawal   - patient with long-term history of binge drinking.  Reports last drink was 4 days ago  - initiate CIWA protocol; Ativan 2 mg IV for CIWA greater than 8  - continue Librium taper  - continue folic acid and thiamine supplementation  - UDS negative     NOE, resolved  Volume depletion, resolved  Hypokalemic hypochloremic metabolic alkalosis secondary to nausea/vomiting, resolved  Hypomagnesemia, resolved   - patient with noted nausea/vomiting with alcohol withdrawal  - IVF discontinued  - BUN/creatinine on admit 34.7/3.99; creatinine back at baseline on a.m. labs  - continue to follow and replete electrolytes as needed     AFib with RVR  NSTEMI, likely type 2  - AFib likely secondary to volume depletion  - Lopressor p.r.n. for HR > 130 sustained  - continue Toprol 12.5 mg b.i.d.  - CV 3; continue Eliquis  - TSH ordered  - troponin has peaked at 0.390.  NST unremarkable    Uncontrolled hypertension  - BP markedly elevated today  - home lisinopril had to be held secondary to NOE, restarted     SIRS 3/4 on admit  - patient's SIRS positive on admission but without identifiable source of infection at this time  - leukocytosis likely reactive secondary to alcohol withdrawal, resolved  - blood cultures negative   - vancomycin and Zosyn discontinued     CODE STATUS: Full  Access: PIV  Antibiotics: None  Diet: Full  DVT Prophylaxis: SCDs- PO Eliquis  GI Prophylaxis: NA  Fluids: None     Jarod Gomez DO  Internal Medicine HO-2

## 2024-09-28 VITALS
SYSTOLIC BLOOD PRESSURE: 137 MMHG | TEMPERATURE: 98 F | HEART RATE: 79 BPM | BODY MASS INDEX: 33.02 KG/M2 | HEIGHT: 71 IN | DIASTOLIC BLOOD PRESSURE: 95 MMHG | OXYGEN SATURATION: 97 % | RESPIRATION RATE: 18 BRPM | WEIGHT: 235.88 LBS

## 2024-09-28 LAB
ALBUMIN SERPL-MCNC: 3.4 G/DL (ref 3.4–4.8)
ALBUMIN/GLOB SERPL: 1.1 RATIO (ref 1.1–2)
ALP SERPL-CCNC: 58 UNIT/L (ref 40–150)
ALT SERPL-CCNC: 50 UNIT/L (ref 0–55)
ANION GAP SERPL CALC-SCNC: 9 MEQ/L
AST SERPL-CCNC: 30 UNIT/L (ref 5–34)
BACTERIA BLD CULT: NORMAL
BACTERIA BLD CULT: NORMAL
BASOPHILS # BLD AUTO: 0.06 X10(3)/MCL
BASOPHILS NFR BLD AUTO: 1 %
BILIRUB SERPL-MCNC: 0.5 MG/DL
BUN SERPL-MCNC: 15.7 MG/DL (ref 8.4–25.7)
CALCIUM SERPL-MCNC: 10.6 MG/DL (ref 8.8–10)
CHLORIDE SERPL-SCNC: 105 MMOL/L (ref 98–107)
CO2 SERPL-SCNC: 28 MMOL/L (ref 23–31)
CREAT SERPL-MCNC: 1.23 MG/DL (ref 0.73–1.18)
CREAT/UREA NIT SERPL: 13
EOSINOPHIL # BLD AUTO: 0.41 X10(3)/MCL (ref 0–0.9)
EOSINOPHIL NFR BLD AUTO: 6.7 %
ERYTHROCYTE [DISTWIDTH] IN BLOOD BY AUTOMATED COUNT: 14.2 % (ref 11.5–17)
GFR SERPLBLD CREATININE-BSD FMLA CKD-EPI: >60 ML/MIN/1.73/M2
GLOBULIN SER-MCNC: 3.1 GM/DL (ref 2.4–3.5)
GLUCOSE SERPL-MCNC: 98 MG/DL (ref 82–115)
HCT VFR BLD AUTO: 40.5 % (ref 42–52)
HGB BLD-MCNC: 13.8 G/DL (ref 14–18)
HOLD SPECIMEN: NORMAL
IMM GRANULOCYTES # BLD AUTO: 0.03 X10(3)/MCL (ref 0–0.04)
IMM GRANULOCYTES NFR BLD AUTO: 0.5 %
LYMPHOCYTES # BLD AUTO: 2.46 X10(3)/MCL (ref 0.6–4.6)
LYMPHOCYTES NFR BLD AUTO: 40 %
MAGNESIUM SERPL-MCNC: 1.9 MG/DL (ref 1.6–2.6)
MCH RBC QN AUTO: 30 PG (ref 27–31)
MCHC RBC AUTO-ENTMCNC: 34.1 G/DL (ref 33–36)
MCV RBC AUTO: 88 FL (ref 80–94)
MONOCYTES # BLD AUTO: 0.61 X10(3)/MCL (ref 0.1–1.3)
MONOCYTES NFR BLD AUTO: 9.9 %
NEUTROPHILS # BLD AUTO: 2.58 X10(3)/MCL (ref 2.1–9.2)
NEUTROPHILS NFR BLD AUTO: 41.9 %
NRBC BLD AUTO-RTO: 0 %
PHOSPHATE SERPL-MCNC: 5.5 MG/DL (ref 2.3–4.7)
PLATELET # BLD AUTO: 205 X10(3)/MCL (ref 130–400)
PMV BLD AUTO: 10 FL (ref 7.4–10.4)
POTASSIUM SERPL-SCNC: 4.1 MMOL/L (ref 3.5–5.1)
PROT SERPL-MCNC: 6.5 GM/DL (ref 5.8–7.6)
RBC # BLD AUTO: 4.6 X10(6)/MCL (ref 4.7–6.1)
SODIUM SERPL-SCNC: 142 MMOL/L (ref 136–145)
WBC # BLD AUTO: 6.15 X10(3)/MCL (ref 4.5–11.5)

## 2024-09-28 PROCEDURE — 25000003 PHARM REV CODE 250

## 2024-09-28 PROCEDURE — 63600175 PHARM REV CODE 636 W HCPCS

## 2024-09-28 PROCEDURE — 36415 COLL VENOUS BLD VENIPUNCTURE: CPT

## 2024-09-28 PROCEDURE — 94761 N-INVAS EAR/PLS OXIMETRY MLT: CPT

## 2024-09-28 PROCEDURE — 84100 ASSAY OF PHOSPHORUS: CPT

## 2024-09-28 PROCEDURE — 94760 N-INVAS EAR/PLS OXIMETRY 1: CPT

## 2024-09-28 PROCEDURE — 80053 COMPREHEN METABOLIC PANEL: CPT

## 2024-09-28 PROCEDURE — 85025 COMPLETE CBC W/AUTO DIFF WBC: CPT

## 2024-09-28 PROCEDURE — 83735 ASSAY OF MAGNESIUM: CPT

## 2024-09-28 RX ORDER — LISINOPRIL 40 MG/1
40 TABLET ORAL DAILY
Qty: 30 TABLET | Refills: 1 | Status: SHIPPED | OUTPATIENT
Start: 2024-09-28 | End: 2025-09-28

## 2024-09-28 RX ADMIN — FOLIC ACID 1 MG: 1 TABLET ORAL at 08:09

## 2024-09-28 RX ADMIN — CHLORDIAZEPOXIDE HYDROCHLORIDE 25 MG: 25 CAPSULE ORAL at 02:09

## 2024-09-28 RX ADMIN — LISINOPRIL 40 MG: 10 TABLET ORAL at 08:09

## 2024-09-28 RX ADMIN — SODIUM CHLORIDE, POTASSIUM CHLORIDE, SODIUM LACTATE AND CALCIUM CHLORIDE 500 ML: 600; 310; 30; 20 INJECTION, SOLUTION INTRAVENOUS at 08:09

## 2024-09-28 RX ADMIN — FAMOTIDINE 20 MG: 20 TABLET ORAL at 08:09

## 2024-09-28 RX ADMIN — APIXABAN 5 MG: 2.5 TABLET, FILM COATED ORAL at 08:09

## 2024-09-28 RX ADMIN — THERA TABS 1 TABLET: TAB at 08:09

## 2024-09-28 RX ADMIN — METOPROLOL SUCCINATE 25 MG: 25 TABLET, EXTENDED RELEASE ORAL at 08:09

## 2024-09-28 RX ADMIN — THIAMINE HCL TAB 100 MG 100 MG: 100 TAB at 08:09

## 2024-11-14 ENCOUNTER — TELEPHONE (OUTPATIENT)
Dept: CARDIOLOGY | Facility: CLINIC | Age: 59
End: 2024-11-14
Payer: COMMERCIAL

## 2024-11-14 NOTE — TELEPHONE ENCOUNTER
Attempted to contact patient to schedule cardio referred apt but no answer and no vm setup. Will mail letter to pt to contact clinic.

## 2024-11-30 ENCOUNTER — HOSPITAL ENCOUNTER (INPATIENT)
Facility: HOSPITAL | Age: 59
LOS: 10 days | Discharge: PSYCHIATRIC OR SUBSTANCE USE FACILITY | DRG: 682 | End: 2024-12-10
Attending: EMERGENCY MEDICINE | Admitting: INTERNAL MEDICINE
Payer: MEDICAID

## 2024-11-30 DIAGNOSIS — R07.9 CHEST PAIN: ICD-10-CM

## 2024-11-30 DIAGNOSIS — N17.9 AKI (ACUTE KIDNEY INJURY): ICD-10-CM

## 2024-11-30 DIAGNOSIS — K52.9 GASTROENTERITIS: Primary | ICD-10-CM

## 2024-11-30 DIAGNOSIS — I48.91 ATRIAL FIBRILLATION: ICD-10-CM

## 2024-11-30 DIAGNOSIS — E87.20 METABOLIC ACIDOSIS: ICD-10-CM

## 2024-11-30 DIAGNOSIS — R55 SYNCOPE: ICD-10-CM

## 2024-11-30 DIAGNOSIS — E86.0 DEHYDRATION: ICD-10-CM

## 2024-11-30 LAB
ALBUMIN SERPL-MCNC: 3.5 G/DL (ref 3.4–4.8)
ALBUMIN/GLOB SERPL: 1.2 RATIO (ref 1.1–2)
ALP SERPL-CCNC: 70 UNIT/L (ref 40–150)
ALT SERPL-CCNC: 10 UNIT/L (ref 0–55)
AMMONIA PLAS-MSCNC: 27.7 UMOL/L (ref 18–72)
AMPHET UR QL SCN: NEGATIVE
ANION GAP SERPL CALC-SCNC: 33 MEQ/L
ANION GAP SERPL CALC-SCNC: 37 MEQ/L
AST SERPL-CCNC: 8 UNIT/L (ref 5–34)
B PERT.PT PRMT NPH QL NAA+NON-PROBE: NOT DETECTED
BACTERIA #/AREA URNS AUTO: ABNORMAL /HPF
BARBITURATE SCN PRESENT UR: NEGATIVE
BASOPHILS # BLD AUTO: 0.01 X10(3)/MCL
BASOPHILS # BLD AUTO: 0.02 X10(3)/MCL
BASOPHILS NFR BLD AUTO: 0.1 %
BASOPHILS NFR BLD AUTO: 0.2 %
BENZODIAZ UR QL SCN: POSITIVE
BILIRUB SERPL-MCNC: 0.5 MG/DL
BILIRUB UR QL STRIP.AUTO: NEGATIVE
BNP BLD-MCNC: 46.5 PG/ML
BUN SERPL-MCNC: 237.7 MG/DL (ref 8.4–25.7)
BUN SERPL-MCNC: 247 MG/DL (ref 8.4–25.7)
C PNEUM DNA NPH QL NAA+NON-PROBE: NOT DETECTED
CALCIUM SERPL-MCNC: 8.1 MG/DL (ref 8.8–10)
CALCIUM SERPL-MCNC: 8.4 MG/DL (ref 8.8–10)
CANNABINOIDS UR QL SCN: NEGATIVE
CHLORIDE SERPL-SCNC: 87 MMOL/L (ref 98–107)
CHLORIDE SERPL-SCNC: 88 MMOL/L (ref 98–107)
CK SERPL-CCNC: 91 U/L (ref 30–200)
CLARITY UR: CLEAR
CO2 SERPL-SCNC: 9 MMOL/L (ref 23–31)
CO2 SERPL-SCNC: 9 MMOL/L (ref 23–31)
COCAINE UR QL SCN: NEGATIVE
COLOR STL: NORMAL
COLOR UR AUTO: ABNORMAL
CONSISTENCY STL: NORMAL
CREAT SERPL-MCNC: 14.79 MG/DL (ref 0.72–1.25)
CREAT SERPL-MCNC: 16.67 MG/DL (ref 0.72–1.25)
CREAT/UREA NIT SERPL: 15
CREAT/UREA NIT SERPL: 16
EOSINOPHIL # BLD AUTO: 0.01 X10(3)/MCL (ref 0–0.9)
EOSINOPHIL # BLD AUTO: 0.02 X10(3)/MCL (ref 0–0.9)
EOSINOPHIL NFR BLD AUTO: 0.1 %
EOSINOPHIL NFR BLD AUTO: 0.2 %
ERYTHROCYTE [DISTWIDTH] IN BLOOD BY AUTOMATED COUNT: 13.5 % (ref 11.5–17)
ERYTHROCYTE [DISTWIDTH] IN BLOOD BY AUTOMATED COUNT: 13.7 % (ref 11.5–17)
ETHANOL SERPL-MCNC: <10 MG/DL
FENTANYL UR QL SCN: NEGATIVE
FLUAV AG UPPER RESP QL IA.RAPID: NOT DETECTED
FLUBV AG UPPER RESP QL IA.RAPID: NOT DETECTED
GFR SERPLBLD CREATININE-BSD FMLA CKD-EPI: 3 ML/MIN/1.73/M2
GFR SERPLBLD CREATININE-BSD FMLA CKD-EPI: 3 ML/MIN/1.73/M2
GLOBULIN SER-MCNC: 2.9 GM/DL (ref 2.4–3.5)
GLUCOSE SERPL-MCNC: 131 MG/DL (ref 82–115)
GLUCOSE SERPL-MCNC: 132 MG/DL (ref 82–115)
GLUCOSE UR QL STRIP: NORMAL
HADV DNA NPH QL NAA+NON-PROBE: NOT DETECTED
HCOV 229E RNA NPH QL NAA+NON-PROBE: NOT DETECTED
HCOV HKU1 RNA NPH QL NAA+NON-PROBE: NOT DETECTED
HCOV NL63 RNA NPH QL NAA+NON-PROBE: NOT DETECTED
HCOV OC43 RNA NPH QL NAA+NON-PROBE: NOT DETECTED
HCT VFR BLD AUTO: 24.2 % (ref 42–52)
HCT VFR BLD AUTO: 30.7 % (ref 42–52)
HEMOCCULT SP1 STL QL: NEGATIVE
HGB BLD-MCNC: 11.1 G/DL (ref 14–18)
HGB BLD-MCNC: 9 G/DL (ref 14–18)
HGB UR QL STRIP: ABNORMAL
HMPV RNA NPH QL NAA+NON-PROBE: NOT DETECTED
HPIV1 RNA NPH QL NAA+NON-PROBE: NOT DETECTED
HPIV2 RNA NPH QL NAA+NON-PROBE: NOT DETECTED
HPIV3 RNA NPH QL NAA+NON-PROBE: NOT DETECTED
HPIV4 RNA NPH QL NAA+NON-PROBE: NOT DETECTED
HYALINE CASTS #/AREA URNS LPF: ABNORMAL /LPF
IMM GRANULOCYTES # BLD AUTO: 0.04 X10(3)/MCL (ref 0–0.04)
IMM GRANULOCYTES # BLD AUTO: 0.05 X10(3)/MCL (ref 0–0.04)
IMM GRANULOCYTES NFR BLD AUTO: 0.5 %
IMM GRANULOCYTES NFR BLD AUTO: 0.6 %
KETONES UR QL STRIP: NEGATIVE
LACTATE SERPL-SCNC: 1.4 MMOL/L (ref 0.5–2.2)
LACTATE SERPL-SCNC: 2.2 MMOL/L (ref 0.5–2.2)
LEUKOCYTE ESTERASE UR QL STRIP: NEGATIVE
LIPASE SERPL-CCNC: 161 U/L
LYMPHOCYTES # BLD AUTO: 0.51 X10(3)/MCL (ref 0.6–4.6)
LYMPHOCYTES # BLD AUTO: 0.64 X10(3)/MCL (ref 0.6–4.6)
LYMPHOCYTES NFR BLD AUTO: 5.1 %
LYMPHOCYTES NFR BLD AUTO: 9 %
M PNEUMO DNA NPH QL NAA+NON-PROBE: NOT DETECTED
MAGNESIUM SERPL-MCNC: 2.2 MG/DL (ref 1.6–2.6)
MAGNESIUM SERPL-MCNC: 2.2 MG/DL (ref 1.6–2.6)
MCH RBC QN AUTO: 30.2 PG (ref 27–31)
MCH RBC QN AUTO: 30.2 PG (ref 27–31)
MCHC RBC AUTO-ENTMCNC: 36.2 G/DL (ref 33–36)
MCHC RBC AUTO-ENTMCNC: 37.2 G/DL (ref 33–36)
MCV RBC AUTO: 81.2 FL (ref 80–94)
MCV RBC AUTO: 83.4 FL (ref 80–94)
MDMA UR QL SCN: NEGATIVE
MONOCYTES # BLD AUTO: 0.17 X10(3)/MCL (ref 0.1–1.3)
MONOCYTES # BLD AUTO: 0.21 X10(3)/MCL (ref 0.1–1.3)
MONOCYTES NFR BLD AUTO: 2.1 %
MONOCYTES NFR BLD AUTO: 2.4 %
MRSA PCR SCRN (OHS): NOT DETECTED
MUCOUS THREADS URNS QL MICRO: ABNORMAL /LPF
NEUTROPHILS # BLD AUTO: 6.22 X10(3)/MCL (ref 2.1–9.2)
NEUTROPHILS # BLD AUTO: 9.18 X10(3)/MCL (ref 2.1–9.2)
NEUTROPHILS NFR BLD AUTO: 87.8 %
NEUTROPHILS NFR BLD AUTO: 91.9 %
NITRITE UR QL STRIP: NEGATIVE
NRBC BLD AUTO-RTO: 0 %
NRBC BLD AUTO-RTO: 0 %
OPIATES UR QL SCN: NEGATIVE
PCP UR QL: NEGATIVE
PH UR STRIP: 5 [PH]
PH UR: 5 [PH] (ref 3–11)
PLATELET # BLD AUTO: 230 X10(3)/MCL (ref 130–400)
PLATELET # BLD AUTO: 246 X10(3)/MCL (ref 130–400)
PMV BLD AUTO: 9.4 FL (ref 7.4–10.4)
PMV BLD AUTO: 9.6 FL (ref 7.4–10.4)
POCT GLUCOSE: 223 MG/DL (ref 70–110)
POCT GLUCOSE: 328 MG/DL (ref 70–110)
POTASSIUM SERPL-SCNC: 5.2 MMOL/L (ref 3.5–5.1)
POTASSIUM SERPL-SCNC: 6.2 MMOL/L (ref 3.5–5.1)
PROT SERPL-MCNC: 6.4 GM/DL (ref 5.8–7.6)
PROT UR QL STRIP: ABNORMAL
RBC # BLD AUTO: 2.98 X10(6)/MCL (ref 4.7–6.1)
RBC # BLD AUTO: 3.68 X10(6)/MCL (ref 4.7–6.1)
RBC #/AREA URNS AUTO: ABNORMAL /HPF
RSV A 5' UTR RNA NPH QL NAA+PROBE: NOT DETECTED
RSV RNA NPH QL NAA+NON-PROBE: NOT DETECTED
RV+EV RNA NPH QL NAA+NON-PROBE: NOT DETECTED
SARS-COV-2 RNA RESP QL NAA+PROBE: NOT DETECTED
SODIUM SERPL-SCNC: 130 MMOL/L (ref 136–145)
SODIUM SERPL-SCNC: 133 MMOL/L (ref 136–145)
SP GR UR STRIP.AUTO: 1.01 (ref 1–1.03)
SPECIFIC GRAVITY, URINE AUTO (.000) (OHS): 1.01 (ref 1–1.03)
SQUAMOUS #/AREA URNS LPF: ABNORMAL /HPF
TROPONIN I SERPL-MCNC: 0.03 NG/ML (ref 0–0.04)
TROPONIN I SERPL-MCNC: 0.03 NG/ML (ref 0–0.04)
TSH SERPL-ACNC: 0.93 UIU/ML (ref 0.35–4.94)
UROBILINOGEN UR STRIP-ACNC: NORMAL
WBC # BLD AUTO: 7.09 X10(3)/MCL (ref 4.5–11.5)
WBC # BLD AUTO: 9.99 X10(3)/MCL (ref 4.5–11.5)
WBC #/AREA URNS AUTO: ABNORMAL /HPF

## 2024-11-30 PROCEDURE — 84484 ASSAY OF TROPONIN QUANT: CPT | Performed by: NURSE PRACTITIONER

## 2024-11-30 PROCEDURE — A4217 STERILE WATER/SALINE, 500 ML: HCPCS | Performed by: EMERGENCY MEDICINE

## 2024-11-30 PROCEDURE — 81001 URINALYSIS AUTO W/SCOPE: CPT | Performed by: EMERGENCY MEDICINE

## 2024-11-30 PROCEDURE — 87641 MR-STAPH DNA AMP PROBE: CPT | Performed by: NURSE PRACTITIONER

## 2024-11-30 PROCEDURE — 83605 ASSAY OF LACTIC ACID: CPT | Performed by: EMERGENCY MEDICINE

## 2024-11-30 PROCEDURE — 36415 COLL VENOUS BLD VENIPUNCTURE: CPT | Performed by: INTERNAL MEDICINE

## 2024-11-30 PROCEDURE — 36415 COLL VENOUS BLD VENIPUNCTURE: CPT | Performed by: NURSE PRACTITIONER

## 2024-11-30 PROCEDURE — 63600175 PHARM REV CODE 636 W HCPCS: Performed by: EMERGENCY MEDICINE

## 2024-11-30 PROCEDURE — 86318 IA INFECTIOUS AGENT ANTIBODY: CPT | Performed by: NURSE PRACTITIONER

## 2024-11-30 PROCEDURE — 63600175 PHARM REV CODE 636 W HCPCS: Performed by: INTERNAL MEDICINE

## 2024-11-30 PROCEDURE — 99285 EMERGENCY DEPT VISIT HI MDM: CPT | Mod: 25

## 2024-11-30 PROCEDURE — 83735 ASSAY OF MAGNESIUM: CPT | Performed by: NURSE PRACTITIONER

## 2024-11-30 PROCEDURE — 82272 OCCULT BLD FECES 1-3 TESTS: CPT | Performed by: NURSE PRACTITIONER

## 2024-11-30 PROCEDURE — 25000003 PHARM REV CODE 250: Performed by: NURSE PRACTITIONER

## 2024-11-30 PROCEDURE — 80053 COMPREHEN METABOLIC PANEL: CPT | Performed by: EMERGENCY MEDICINE

## 2024-11-30 PROCEDURE — 83880 ASSAY OF NATRIURETIC PEPTIDE: CPT | Performed by: NURSE PRACTITIONER

## 2024-11-30 PROCEDURE — 84484 ASSAY OF TROPONIN QUANT: CPT | Performed by: EMERGENCY MEDICINE

## 2024-11-30 PROCEDURE — 87040 BLOOD CULTURE FOR BACTERIA: CPT | Performed by: NURSE PRACTITIONER

## 2024-11-30 PROCEDURE — 93010 ELECTROCARDIOGRAM REPORT: CPT | Mod: ,,, | Performed by: INTERNAL MEDICINE

## 2024-11-30 PROCEDURE — 25000003 PHARM REV CODE 250: Performed by: EMERGENCY MEDICINE

## 2024-11-30 PROCEDURE — 82077 ASSAY SPEC XCP UR&BREATH IA: CPT | Performed by: EMERGENCY MEDICINE

## 2024-11-30 PROCEDURE — 82140 ASSAY OF AMMONIA: CPT | Performed by: NURSE PRACTITIONER

## 2024-11-30 PROCEDURE — 85025 COMPLETE CBC W/AUTO DIFF WBC: CPT | Performed by: NURSE PRACTITIONER

## 2024-11-30 PROCEDURE — 93005 ELECTROCARDIOGRAM TRACING: CPT

## 2024-11-30 PROCEDURE — 96374 THER/PROPH/DIAG INJ IV PUSH: CPT

## 2024-11-30 PROCEDURE — 84443 ASSAY THYROID STIM HORMONE: CPT | Performed by: EMERGENCY MEDICINE

## 2024-11-30 PROCEDURE — 0241U COVID/RSV/FLU A&B PCR: CPT | Performed by: NURSE PRACTITIONER

## 2024-11-30 PROCEDURE — 85025 COMPLETE CBC W/AUTO DIFF WBC: CPT | Performed by: EMERGENCY MEDICINE

## 2024-11-30 PROCEDURE — 80307 DRUG TEST PRSMV CHEM ANLYZR: CPT | Performed by: NURSE PRACTITIONER

## 2024-11-30 PROCEDURE — 87507 IADNA-DNA/RNA PROBE TQ 12-25: CPT | Performed by: EMERGENCY MEDICINE

## 2024-11-30 PROCEDURE — 82550 ASSAY OF CK (CPK): CPT | Performed by: EMERGENCY MEDICINE

## 2024-11-30 PROCEDURE — 80048 BASIC METABOLIC PNL TOTAL CA: CPT | Mod: XB | Performed by: NURSE PRACTITIONER

## 2024-11-30 PROCEDURE — 25000003 PHARM REV CODE 250: Performed by: INTERNAL MEDICINE

## 2024-11-30 PROCEDURE — 96361 HYDRATE IV INFUSION ADD-ON: CPT

## 2024-11-30 PROCEDURE — 87581 M.PNEUMON DNA AMP PROBE: CPT | Performed by: NURSE PRACTITIONER

## 2024-11-30 PROCEDURE — 63600175 PHARM REV CODE 636 W HCPCS: Performed by: NURSE PRACTITIONER

## 2024-11-30 PROCEDURE — 21400001 HC TELEMETRY ROOM

## 2024-11-30 PROCEDURE — 83735 ASSAY OF MAGNESIUM: CPT | Performed by: EMERGENCY MEDICINE

## 2024-11-30 PROCEDURE — 83690 ASSAY OF LIPASE: CPT | Performed by: EMERGENCY MEDICINE

## 2024-11-30 PROCEDURE — 84132 ASSAY OF SERUM POTASSIUM: CPT | Performed by: INTERNAL MEDICINE

## 2024-11-30 PROCEDURE — 11000001 HC ACUTE MED/SURG PRIVATE ROOM

## 2024-11-30 RX ORDER — ENOXAPARIN SODIUM 100 MG/ML
30 INJECTION SUBCUTANEOUS EVERY 24 HOURS
Status: DISCONTINUED | OUTPATIENT
Start: 2024-11-30 | End: 2024-11-30

## 2024-11-30 RX ORDER — OMEPRAZOLE 20 MG/1
20 CAPSULE, DELAYED RELEASE ORAL DAILY
Status: ON HOLD | COMMUNITY
End: 2024-12-12

## 2024-11-30 RX ORDER — CHLORDIAZEPOXIDE HYDROCHLORIDE 25 MG/1
25 CAPSULE, GELATIN COATED ORAL
Status: DISCONTINUED | OUTPATIENT
Start: 2024-12-04 | End: 2024-12-02

## 2024-11-30 RX ORDER — ACETAMINOPHEN 500 MG
1000 TABLET ORAL EVERY 6 HOURS PRN
Status: DISCONTINUED | OUTPATIENT
Start: 2024-11-30 | End: 2024-12-10 | Stop reason: HOSPADM

## 2024-11-30 RX ORDER — MUPIROCIN 20 MG/G
OINTMENT TOPICAL 2 TIMES DAILY
Status: COMPLETED | OUTPATIENT
Start: 2024-12-02 | End: 2024-12-07

## 2024-11-30 RX ORDER — GLUCAGON 1 MG
1 KIT INJECTION
Status: DISCONTINUED | OUTPATIENT
Start: 2024-11-30 | End: 2024-12-10 | Stop reason: HOSPADM

## 2024-11-30 RX ORDER — CALCIUM GLUCONATE 20 MG/ML
1 INJECTION, SOLUTION INTRAVENOUS ONCE
Status: COMPLETED | OUTPATIENT
Start: 2024-11-30 | End: 2024-11-30

## 2024-11-30 RX ORDER — CHLORDIAZEPOXIDE HYDROCHLORIDE 25 MG/1
100 CAPSULE, GELATIN COATED ORAL ONCE
Status: COMPLETED | OUTPATIENT
Start: 2024-11-30 | End: 2024-12-01

## 2024-11-30 RX ORDER — PROCHLORPERAZINE EDISYLATE 5 MG/ML
10 INJECTION INTRAMUSCULAR; INTRAVENOUS
Status: COMPLETED | OUTPATIENT
Start: 2024-11-30 | End: 2024-11-30

## 2024-11-30 RX ORDER — CHLORDIAZEPOXIDE HYDROCHLORIDE 25 MG/1
25 CAPSULE, GELATIN COATED ORAL
Status: DISCONTINUED | OUTPATIENT
Start: 2024-12-05 | End: 2024-12-02

## 2024-11-30 RX ORDER — BISACODYL 10 MG/1
10 SUPPOSITORY RECTAL DAILY PRN
Status: DISCONTINUED | OUTPATIENT
Start: 2024-11-30 | End: 2024-12-10 | Stop reason: HOSPADM

## 2024-11-30 RX ORDER — IBUPROFEN 200 MG
16 TABLET ORAL
Status: DISCONTINUED | OUTPATIENT
Start: 2024-11-30 | End: 2024-12-10 | Stop reason: HOSPADM

## 2024-11-30 RX ORDER — THIAMINE HCL 100 MG
100 TABLET ORAL DAILY
Status: DISCONTINUED | OUTPATIENT
Start: 2024-12-01 | End: 2024-12-01

## 2024-11-30 RX ORDER — NALOXONE HCL 0.4 MG/ML
0.02 VIAL (ML) INJECTION
Status: DISCONTINUED | OUTPATIENT
Start: 2024-11-30 | End: 2024-12-10 | Stop reason: HOSPADM

## 2024-11-30 RX ORDER — TALC
6 POWDER (GRAM) TOPICAL NIGHTLY PRN
Status: DISCONTINUED | OUTPATIENT
Start: 2024-11-30 | End: 2024-12-10 | Stop reason: HOSPADM

## 2024-11-30 RX ORDER — IBUPROFEN 200 MG
24 TABLET ORAL
Status: DISCONTINUED | OUTPATIENT
Start: 2024-11-30 | End: 2024-12-10 | Stop reason: HOSPADM

## 2024-11-30 RX ORDER — ATORVASTATIN CALCIUM 40 MG/1
40 TABLET, FILM COATED ORAL DAILY
Status: ON HOLD | COMMUNITY
End: 2024-12-12

## 2024-11-30 RX ORDER — CHLORDIAZEPOXIDE HYDROCHLORIDE 25 MG/1
25 CAPSULE, GELATIN COATED ORAL 4 TIMES DAILY PRN
Status: DISCONTINUED | OUTPATIENT
Start: 2024-11-30 | End: 2024-11-30

## 2024-11-30 RX ORDER — SODIUM CHLORIDE 0.9 % (FLUSH) 0.9 %
10 SYRINGE (ML) INJECTION
Status: DISCONTINUED | OUTPATIENT
Start: 2024-11-30 | End: 2024-12-10 | Stop reason: HOSPADM

## 2024-11-30 RX ORDER — ALUMINUM HYDROXIDE, MAGNESIUM HYDROXIDE, AND SIMETHICONE 1200; 120; 1200 MG/30ML; MG/30ML; MG/30ML
30 SUSPENSION ORAL 4 TIMES DAILY PRN
Status: DISCONTINUED | OUTPATIENT
Start: 2024-11-30 | End: 2024-12-10 | Stop reason: HOSPADM

## 2024-11-30 RX ORDER — CHLORDIAZEPOXIDE HYDROCHLORIDE 25 MG/1
25 CAPSULE, GELATIN COATED ORAL
Status: DISCONTINUED | OUTPATIENT
Start: 2024-12-06 | End: 2024-12-02

## 2024-11-30 RX ORDER — CHLORDIAZEPOXIDE HYDROCHLORIDE 25 MG/1
25 CAPSULE, GELATIN COATED ORAL
Status: DISCONTINUED | OUTPATIENT
Start: 2024-12-03 | End: 2024-12-02

## 2024-11-30 RX ORDER — CHLORDIAZEPOXIDE HYDROCHLORIDE 25 MG/1
50 CAPSULE, GELATIN COATED ORAL
Status: DISCONTINUED | OUTPATIENT
Start: 2024-12-02 | End: 2024-12-02

## 2024-11-30 RX ORDER — FOLIC ACID 1 MG/1
1 TABLET ORAL DAILY
Status: DISCONTINUED | OUTPATIENT
Start: 2024-12-01 | End: 2024-12-10 | Stop reason: HOSPADM

## 2024-11-30 RX ORDER — AMOXICILLIN 250 MG
1 CAPSULE ORAL 2 TIMES DAILY PRN
Status: DISCONTINUED | OUTPATIENT
Start: 2024-11-30 | End: 2024-12-05

## 2024-11-30 RX ORDER — PROCHLORPERAZINE EDISYLATE 5 MG/ML
5 INJECTION INTRAMUSCULAR; INTRAVENOUS EVERY 6 HOURS PRN
Status: DISCONTINUED | OUTPATIENT
Start: 2024-11-30 | End: 2024-12-10 | Stop reason: HOSPADM

## 2024-11-30 RX ORDER — DEXTROSE, SODIUM CHLORIDE, SODIUM LACTATE, POTASSIUM CHLORIDE, AND CALCIUM CHLORIDE 5; .6; .31; .03; .02 G/100ML; G/100ML; G/100ML; G/100ML; G/100ML
INJECTION, SOLUTION INTRAVENOUS
Status: COMPLETED | OUTPATIENT
Start: 2024-11-30 | End: 2024-11-30

## 2024-11-30 RX ORDER — ONDANSETRON HYDROCHLORIDE 2 MG/ML
4 INJECTION, SOLUTION INTRAVENOUS EVERY 4 HOURS PRN
Status: DISCONTINUED | OUTPATIENT
Start: 2024-11-30 | End: 2024-12-10 | Stop reason: HOSPADM

## 2024-11-30 RX ORDER — CHLORDIAZEPOXIDE HYDROCHLORIDE 25 MG/1
50 CAPSULE, GELATIN COATED ORAL
Status: DISCONTINUED | OUTPATIENT
Start: 2024-12-01 | End: 2024-12-02

## 2024-11-30 RX ORDER — CALCIUM GLUCONATE 20 MG/ML
1 INJECTION, SOLUTION INTRAVENOUS EVERY 10 MIN PRN
Status: DISCONTINUED | OUTPATIENT
Start: 2024-11-30 | End: 2024-11-30

## 2024-11-30 RX ADMIN — SODIUM CHLORIDE, POTASSIUM CHLORIDE, SODIUM LACTATE AND CALCIUM CHLORIDE 1000 ML: 600; 310; 30; 20 INJECTION, SOLUTION INTRAVENOUS at 01:11

## 2024-11-30 RX ADMIN — PROCHLORPERAZINE EDISYLATE 10 MG: 5 INJECTION INTRAMUSCULAR; INTRAVENOUS at 01:11

## 2024-11-30 RX ADMIN — CALCIUM GLUCONATE 1 G: 20 INJECTION, SOLUTION INTRAVENOUS at 10:11

## 2024-11-30 RX ADMIN — DEXTROSE MONOHYDRATE 500 ML: 100 INJECTION, SOLUTION INTRAVENOUS at 10:11

## 2024-11-30 RX ADMIN — SODIUM ZIRCONIUM CYCLOSILICATE 10 G: 10 POWDER, FOR SUSPENSION ORAL at 11:11

## 2024-11-30 RX ADMIN — SODIUM BICARBONATE: 84 INJECTION, SOLUTION INTRAVENOUS at 05:11

## 2024-11-30 RX ADMIN — HUMAN INSULIN 9.53 UNITS: 100 INJECTION, SOLUTION SUBCUTANEOUS at 11:11

## 2024-11-30 RX ADMIN — THIAMINE HYDROCHLORIDE 500 MG: 100 INJECTION, SOLUTION INTRAMUSCULAR; INTRAVENOUS at 08:11

## 2024-11-30 RX ADMIN — SODIUM CHLORIDE, SODIUM LACTATE, POTASSIUM CHLORIDE, CALCIUM CHLORIDE AND DEXTROSE MONOHYDRATE: 5; 600; 310; 30; 20 INJECTION, SOLUTION INTRAVENOUS at 05:11

## 2024-11-30 RX ADMIN — CHLORDIAZEPOXIDE HYDROCHLORIDE 100 MG: 25 CAPSULE ORAL at 08:11

## 2024-11-30 RX ADMIN — PIPERACILLIN SODIUM AND TAZOBACTAM SODIUM 4.5 G: 4; .5 INJECTION, POWDER, LYOPHILIZED, FOR SOLUTION INTRAVENOUS at 08:11

## 2024-11-30 NOTE — ED PROVIDER NOTES
Encounter Date: 11/30/2024    SCRIBE #1 NOTE: I, Patricia Mccullough, am scribing for, and in the presence of,  Allan Davis MD. I have scribed the following portions of the note - Other sections scribed: HPI, ROS, PE.       History     Chief Complaint   Patient presents with    Diarrhea     Pt found in tub of feces per EMS w/ alcohol bottles surrounding bathtub. Pt denies ETOH use. Endorses generalized abd pain, vomiting, and diarrhea x 2 months. Pt also had near syncopal episode prior to EMS arrival. Initial BP 86/40 but was given fluids with EMS and 4mg zofran      The patient is a 59 year old male with hx of seizures and etoh abuse presenting to the ED due to diarrhea onset a couple of months. The patient complains of generalized abdominal pain, headache, and vomiting. He states that he goes to the bathroom more than 5x per day. The patient reporting of having an hx of etoh abuse and reports of quitting etoh 1 month ago.     The history is provided by the patient and medical records. No  was used.     Review of patient's allergies indicates:   Allergen Reactions    Niacin Other (See Comments)     I get flushed and burn up     History reviewed. No pertinent past medical history.  History reviewed. No pertinent surgical history.  No family history on file.  Social History     Tobacco Use    Smoking status: Never    Smokeless tobacco: Never   Substance Use Topics    Alcohol use: Yes     Alcohol/week: 40.0 standard drinks of alcohol     Types: 40 Shots of liquor per week     Comment: more than a month ago was last drink    Drug use: Never     Review of Systems   Constitutional:  Negative for fatigue, fever and unexpected weight change.   HENT:  Negative for congestion and rhinorrhea.    Eyes:  Negative for pain.   Respiratory:  Negative for chest tightness, shortness of breath and wheezing.    Cardiovascular:  Negative for chest pain.   Gastrointestinal:  Positive for abdominal pain, diarrhea  and vomiting. Negative for constipation and nausea.   Genitourinary:  Negative for dysuria.   Musculoskeletal:  Negative for back pain and neck pain.   Skin:  Negative for rash.   Allergic/Immunologic: Negative for environmental allergies, food allergies and immunocompromised state.   Neurological:  Positive for headaches. Negative for dizziness and speech difficulty.   Hematological:  Does not bruise/bleed easily.   Psychiatric/Behavioral:  Negative for sleep disturbance and suicidal ideas.        Physical Exam     Initial Vitals [11/30/24 1301]   BP Pulse Resp Temp SpO2   (!) 96/46 98 18 (!) 95.7 °F (35.4 °C) 98 %      MAP       --         Physical Exam    Nursing note and vitals reviewed.  Constitutional: No distress.   Disheveled.    HENT:   Head: Normocephalic and atraumatic.   Right Ear: Tympanic membrane normal.   Left Ear: Tympanic membrane normal. Mouth/Throat: Oropharynx is clear and moist. Mucous membranes are dry. No oropharyngeal exudate or posterior oropharyngeal erythema.   Eyes: EOM are normal.   Neck: Trachea normal. Neck supple. Carotid bruit is not present. No JVD present.   Normal range of motion.  Cardiovascular:  Normal rate and regular rhythm.           Murmur heard.  Systolic murmur is present with a grade of 2/6.  Pulmonary/Chest: Breath sounds normal. No respiratory distress. He exhibits no tenderness.   Abdominal: Abdomen is soft. Bowel sounds are normal. He exhibits no distension. There is generalized abdominal tenderness (Diffuse abdominal tenderness.). There is guarding (Voluntary gaurding.). There is no rebound.   Musculoskeletal:         General: Normal range of motion.      Cervical back: Normal range of motion and neck supple.      Lumbar back: Normal. No tenderness. Normal range of motion.     Neurological: He is alert and oriented to person, place, and time. He has normal strength. No cranial nerve deficit or sensory deficit.   Skin: There is pallor.   Psychiatric: He has a normal  mood and affect. Judgment normal.         ED Course   Critical Care    Date/Time: 12/8/2024 10:35 AM    Performed by: Allan Davis MD  Authorized by: Albert Toledo MD  Direct patient critical care time: 18 minutes  Additional history critical care time: 6 minutes  Ordering / reviewing critical care time: 8 minutes  Documentation critical care time: 11 minutes  Consulting other physicians critical care time: 4 minutes  Total critical care time (exclusive of procedural time) : 47 minutes  Critical care time was exclusive of separately billable procedures and treating other patients.  Critical care was necessary to treat or prevent imminent or life-threatening deterioration of the following conditions: renal failure.  Critical care was time spent personally by me on the following activities: development of treatment plan with patient or surrogate, discussions with consultants, interpretation of cardiac output measurements, evaluation of patient's response to treatment, examination of patient, obtaining history from patient or surrogate, ordering and performing treatments and interventions, ordering and review of laboratory studies, ordering and review of radiographic studies, pulse oximetry, re-evaluation of patient's condition and review of old charts.        Labs Reviewed   COMPREHENSIVE METABOLIC PANEL - Abnormal       Result Value    Sodium 133 (*)     Potassium 5.2 (*)     Chloride 87 (*)     CO2 9 (*)     Glucose 131 (*)     Blood Urea Nitrogen 247.0 (*)     Creatinine 16.67 (*)     Calcium 8.1 (*)     Protein Total 6.4      Albumin 3.5      Globulin 2.9      Albumin/Globulin Ratio 1.2      Bilirubin Total 0.5      ALP 70      ALT 10      AST 8      eGFR 3      Anion Gap 37.0      BUN/Creatinine Ratio 15     LIPASE - Abnormal    Lipase Level 161 (*)    CBC WITH DIFFERENTIAL - Abnormal    WBC 9.99      RBC 3.68 (*)     Hgb 11.1 (*)     Hct 30.7 (*)     MCV 83.4      MCH 30.2      MCHC 36.2 (*)      RDW 13.7      Platelet 246      MPV 9.4      Neut % 91.9      Lymph % 5.1      Mono % 2.1      Eos % 0.2      Basophil % 0.2      Lymph # 0.51 (*)     Neut # 9.18      Mono # 0.21      Eos # 0.02      Baso # 0.02      IG# 0.05 (*)     IG% 0.5      NRBC% 0.0     ALCOHOL,MEDICAL (ETHANOL) - Normal    Ethanol Level <10.0     LACTIC ACID, PLASMA - Normal    Lactic Acid Level 2.2     MAGNESIUM - Normal    Magnesium Level 2.20     TROPONIN I - Normal    Troponin-I 0.034     TSH - Normal    TSH 0.932     LACTIC ACID, PLASMA - Normal    Lactic Acid Level 1.4     CK - Normal    Creatine Kinase 91     CBC W/ AUTO DIFFERENTIAL    Narrative:     The following orders were created for panel order CBC auto differential.  Procedure                               Abnormality         Status                     ---------                               -----------         ------                     CBC with Differential[[id number]]       Abnormal            Final result                 Please view results for these tests on the individual orders.        ECG Results    None       Imaging Results              US Retroperitoneal Complete (Final result)  Result time 12/01/24 11:33:46      Final result by Reinaldo Montanez MD (12/01/24 11:33:46)                   Impression:      No hydronephrosis.      Electronically signed by: Reinaldo Montanez  Date:    12/01/2024  Time:    11:33               Narrative:    EXAMINATION:  US RETROPERITONEAL COMPLETE    CLINICAL HISTORY:  acute renal failure;    TECHNIQUE:  Ultrasound evaluation of the kidneys, region of the ureters, and urinary bladder.    COMPARISON:  CT 11/30/2024    FINDINGS:  No hydronephrosis. Normal renal echogenicity.    Calculated bladder volume 236 mL.    Poor visualization of the IVC.  Abdominal aorta obscured.    Measurements:    - Right kidney: 11.0 cm in length    - Left kidney: 12.4 cm in length                        Preliminary result by Jeff Mena MD (11/30/24 17:44:46)                    Impression:    1. Unremarkable ultrasound of the kidneys and bladder as above.               Narrative:    START OF REPORT:  Technique: Gray scale and color doppler images of the kidneys and bladder were performed with select images being submitted for interpretation.    Clinical history: ED 27 acute renal failure.    Kidneys:  Right Kidney: Unremarkable right kidney with no stones cysts masses or hydronephrosis identified. The right kidney measures 11 cm sagittal by 7 cm by 6.2 cm with a volume of 250.4 cc.  Left Kidney: Unremarkable left kidney with no stones cysts masses or hydronephrosis identified. The left kidney measures 12.4cm sagittal by 6.7 cm by 5.9 cm with a volume of 256 cc.    Urinary bladder: Unremarkable appearing urinary bladder. Pre-void volume measures 236 cc.    Miscellaneous: The visualized inferior vena cava appears to be within normal limits. The aorta is obscured by bowel gas.                                         CT Abdomen Pelvis  Without Contrast (Final result)  Result time 11/30/24 16:14:38      Final result by Reinaldo Monatnez MD (11/30/24 16:14:38)                   Impression:      No acute process identified in the abdomen or pelvis.    Patchy ground-glass in the lung bases suggestive of infectious or inflammatory process.      Electronically signed by: Reinaldo Montanez  Date:    11/30/2024  Time:    16:14               Narrative:    EXAMINATION:  CT ABDOMEN PELVIS WITHOUT CONTRAST    CLINICAL HISTORY:  Abdominal pain, acute, nonlocalized;    TECHNIQUE:  CT imaging of the abdomen and pelvis without intravenous contrast. Axial, coronal and sagittal reformatted images reviewed. Dose length product is 695 mGycm. Automatic exposure control, adjustment of mA/kV or iterative reconstruction technique used to limit radiation dose.    COMPARISON:  No relevant comparison studies available at the time of dictation.    FINDINGS:  Assessment of the visceral organs and vasculature is  limited by the lack of IV contrast.    Liver/biliary: No concerning hepatic findings. No radiodense gallstone or biliary dilatation appreciated.    Pancreas: Normal.    Spleen: Normal.    Adrenals: Normal.    Genitourinary: No hydronephrosis or defined urinary stone. Bladder within normal limits.    Stomach/bowel: No bowel obstruction. Normal appendix. No discernible bowel inflammation.    Lymph nodes and peritoneum: No pathologically enlarged lymph node identified with noncontrast technique. No ascites or free air.    Vasculature: Aortoiliac atherosclerosis.    Abdominal wall: Normal.    Lung bases: Patchy ground-glass in both lung bases.    Bones: No acute osseous findings.                                       Medications   sodium chloride 0.9% flush 10 mL (has no administration in time range)   melatonin tablet 6 mg (has no administration in time range)   ondansetron injection 4 mg (has no administration in time range)   prochlorperazine injection Soln 5 mg (has no administration in time range)   bisacodyL suppository 10 mg (has no administration in time range)   aluminum-magnesium hydroxide-simethicone 200-200-20 mg/5 mL suspension 30 mL (30 mLs Oral Given 12/8/24 0956)   acetaminophen tablet 1,000 mg (1,000 mg Oral Given 12/6/24 2157)   naloxone 0.4 mg/mL injection 0.02 mg (has no administration in time range)   glucose chewable tablet 16 g (has no administration in time range)   glucose chewable tablet 24 g (has no administration in time range)   glucagon (human recombinant) injection 1 mg (has no administration in time range)   dextrose 10% bolus 125 mL 125 mL (has no administration in time range)   dextrose 10% bolus 250 mL 250 mL (has no administration in time range)   multivitamin tablet (1 tablet Oral Given 12/8/24 0913)   folic acid tablet 1 mg (1 mg Oral Given 12/8/24 0914)   dextrose 10% bolus 500 mL 500 mL (0 mLs Intravenous Stopped 11/30/24 5425)     And   dextrose 10% bolus 250 mL 250 mL (has no  administration in time range)     And   insulin regular injection 9.53 Units 0.0953 mL (9.53 Units Intravenous Given 11/30/24 2307)   vancomycin capsule 125 mg (125 mg Oral Given 12/8/24 0521)   heparin, porcine (PF) 100 unit/mL injection flush 500 Units (has no administration in time range)   QUEtiapine tablet 50 mg (50 mg Oral Given 12/7/24 2040)   heparin (porcine) injection 5,000 Units (5,000 Units Subcutaneous Given 12/8/24 0913)   cetirizine tablet 10 mg (10 mg Oral Given 12/8/24 0913)   fluticasone propionate 50 mcg/actuation nasal spray 100 mcg (100 mcg Each Nostril Given 12/8/24 0957)   thiamine tablet 100 mg (100 mg Oral Given 12/8/24 0914)   chlordiazepoxide capsule 25 mg (25 mg Oral Given 12/8/24 0914)   cholestyramine-aspartame 4 gram packet 4 grams of anhydrous cholestyramine (4 grams of anhydrous cholestyramine Oral Given 12/7/24 2040)   butalbital-acetaminophen-caffeine -40 mg per tablet 1 tablet (1 tablet Oral Given 12/4/24 0908)   sertraline tablet 100 mg (100 mg Oral Given 12/8/24 0913)   tamsulosin 24 hr capsule 0.4 mg (0.4 mg Oral Given 12/8/24 0913)   magnesium oxide tablet 400 mg (400 mg Oral Given 12/8/24 0913)   lactated ringers bolus 1,000 mL (0 mLs Intravenous Stopped 11/30/24 1429)   prochlorperazine injection Soln 10 mg (10 mg Intravenous Given 11/30/24 1330)   dextrose 5 % in lactated ringers infusion ( Intravenous New Bag 11/30/24 1717)   thiamine (B-1) 500 mg in D5W 100 mL IVPB (0 mg Intravenous Stopped 11/30/24 2103)   mupirocin 2 % ointment ( Nasal Given 12/7/24 0942)   chlordiazepoxide capsule 100 mg (100 mg Oral Given 11/30/24 2031)   calcium gluconate 1 g in NS IVPB (premixed) (0 g Intravenous Stopped 11/30/24 2225)   sodium zirconium cyclosilicate packet 10 g (10 g Oral Given 11/30/24 6624)   lactated ringers bolus 500 mL (0 mLs Intravenous Stopped 12/1/24 6907)   thiamine (B-1) 500 mg in D5W 100 mL IVPB (0 mg Intravenous Stopped 12/2/24 2116)   piperacillin-tazobactam  (ZOSYN) 4.5 g in D5W 100 mL IVPB (MB+) (0 g Intravenous Stopped 12/5/24 0156)   potassium chloride SA CR tablet 40 mEq (40 mEq Oral Given 12/2/24 0844)   magnesium oxide tablet 400 mg (400 mg Oral Given 12/2/24 0844)   potassium chloride SA CR tablet 40 mEq (40 mEq Oral Given 12/2/24 2047)   pseudoephedrine tablet 60 mg (60 mg Oral Given 12/4/24 1155)   magnesium sulfate 2g in water 50mL IVPB (premix) (0 g Intravenous Stopped 12/3/24 0930)   calcium gluconate 1 g in NS IVPB (premixed) (0 g Intravenous Stopped 12/3/24 0844)   magnesium sulfate 2g in water 50mL IVPB (premix) (0 g Intravenous Stopped 12/3/24 1825)   magnesium sulfate 2g in water 50mL IVPB (premix) ( Intravenous Verify Only 12/4/24 1103)   potassium, sodium phosphates 280-160-250 mg packet 2 packet (2 packets Oral Given 12/4/24 2017)   magnesium sulfate 2g in water 50mL IVPB (premix) (0 g Intravenous Stopped 12/6/24 0230)   magnesium sulfate 2g in water 50mL IVPB (premix) (0 g Intravenous Stopped 12/7/24 1142)   sodium zirconium cyclosilicate packet 10 g (10 g Oral Given 12/8/24 0914)     Medical Decision Making  Differential diagnosis includes but is not limited to, dehydration, electrolyte disturbance, infectious diarrhea, ischemic bowel, pancreatitis insuffiencey, irritable bowel syndrome, and inflammatory bowel disease.     Amount and/or Complexity of Data Reviewed  Labs: ordered.  Radiology: ordered.    Risk  Prescription drug management.  Decision regarding hospitalization.            Scribe Attestation:   Scribe #1: I performed the above scribed service and the documentation accurately describes the services I performed. I attest to the accuracy of the note.    Attending Attestation:           Physician Attestation for Scribe:  Physician Attestation Statement for Scribe #1: I, Allan Davis MD, reviewed documentation, as scribed by Patricia Mccullough in my presence, and it is both accurate and complete.             ED Course as of 12/08/24  1036   Sat Nov 30, 2024   1632 Paged Hosptiatlist.  [AS]   1635 Work-up reveals significant NOE, almost certainly due to volume depletion.  Will admit for IVF resuscitation and obtain stool studies.  Will need nephology consult as well. [CL]   1712 Paged Nephrology.  [AS]   1713 I spoke with BRIAN Crespo (hospital medicine) regarding admission.  Recommends bicarb drip, renal consult and admit to Dr. Ivy [CL]   1752 I spoke with BRIAN Almeida (renal) - nephrology will follow along while admitted [CL]      ED Course User Index  [AS] Patricia Mccullough  [CL] Allan Davis MD                           Clinical Impression:  Final diagnoses:  [K52.9] Gastroenteritis (Primary)  [E86.0] Dehydration  [N17.9] NOE (acute kidney injury)  [E87.20] Metabolic acidosis          ED Disposition Condition    Admit Stable                Allan Davis MD  11/30/24 1714       Allan Davis MD  11/30/24 1752       Allan Davis MD  12/08/24 1032

## 2024-12-01 LAB
ADV 40+41 DNA STL QL NAA+NON-PROBE: NOT DETECTED
ALBUMIN SERPL-MCNC: 2.7 G/DL (ref 3.4–4.8)
ALBUMIN/GLOB SERPL: 1 RATIO (ref 1.1–2)
ALP SERPL-CCNC: 56 UNIT/L (ref 40–150)
ALT SERPL-CCNC: 9 UNIT/L (ref 0–55)
ANION GAP SERPL CALC-SCNC: 29 MEQ/L
APAP SERPL-MCNC: <3 UG/ML (ref 10–30)
APICAL FOUR CHAMBER EJECTION FRACTION: 69 %
APICAL TWO CHAMBER EJECTION FRACTION: 65 %
AST SERPL-CCNC: 9 UNIT/L (ref 5–34)
ASTRO TYP 1-8 RNA STL QL NAA+NON-PROBE: NOT DETECTED
AV INDEX (PROSTH): 1.02
AV MEAN GRADIENT: 5 MMHG
AV PEAK GRADIENT: 9 MMHG
AV VALVE AREA BY VELOCITY RATIO: 3.1 CM²
AV VALVE AREA: 3.2 CM²
AV VELOCITY RATIO: 1
BASOPHILS # BLD AUTO: 0.03 X10(3)/MCL
BASOPHILS NFR BLD AUTO: 0.4 %
BILIRUB SERPL-MCNC: 0.4 MG/DL
BUN SERPL-MCNC: 219.4 MG/DL (ref 8.4–25.7)
C CAYETANENSIS DNA STL QL NAA+NON-PROBE: NOT DETECTED
C COLI+JEJ+UPSA DNA STL QL NAA+NON-PROBE: NOT DETECTED
C DIFF TOX A+B STL QL IA: POSITIVE
CALCIUM SERPL-MCNC: 7.6 MG/DL (ref 8.8–10)
CHLORIDE SERPL-SCNC: 82 MMOL/L (ref 98–107)
CLOSTRIDIUM DIFFICILE GDH ANTIGEN (OHS): POSITIVE
CO2 SERPL-SCNC: 16 MMOL/L (ref 23–31)
COLOR STL: NORMAL
CONSISTENCY STL: NORMAL
CREAT SERPL-MCNC: 12.99 MG/DL (ref 0.72–1.25)
CREAT/UREA NIT SERPL: 17
CRYPTOSP DNA STL QL NAA+NON-PROBE: NOT DETECTED
CV ECHO LV RWT: 0.76 CM
DOP CALC AO PEAK VEL: 1.5 M/S
DOP CALC AO VTI: 29.4 CM
DOP CALC LVOT AREA: 3.1 CM2
DOP CALC LVOT DIAMETER: 2 CM
DOP CALC LVOT PEAK VEL: 1.5 M/S
DOP CALC LVOT STROKE VOLUME: 94.5 CM3
DOP CALC MV VTI: 30.8 CM
DOP CALCLVOT PEAK VEL VTI: 30.1 CM
E HISTOLYT DNA STL QL NAA+NON-PROBE: NOT DETECTED
E WAVE DECELERATION TIME: 214 MSEC
E/A RATIO: 0.87
E/E' RATIO: 10.53 M/S
EAEC PAA PLAS AGGR+AATA ST NAA+NON-PRB: NOT DETECTED
EC STX1+STX2 GENES STL QL NAA+NON-PROBE: NOT DETECTED
ECHO LV POSTERIOR WALL: 1.4 CM (ref 0.6–1.1)
EOSINOPHIL # BLD AUTO: 0.12 X10(3)/MCL (ref 0–0.9)
EOSINOPHIL NFR BLD AUTO: 1.8 %
EPEC EAE GENE STL QL NAA+NON-PROBE: NOT DETECTED
ERYTHROCYTE [DISTWIDTH] IN BLOOD BY AUTOMATED COUNT: 13.6 % (ref 11.5–17)
ETEC LTA+ST1A+ST1B TOX ST NAA+NON-PROBE: NOT DETECTED
ETHANOL SERPL-MCNC: <10 MG/DL
G LAMBLIA DNA STL QL NAA+NON-PROBE: NOT DETECTED
GFR SERPLBLD CREATININE-BSD FMLA CKD-EPI: 4 ML/MIN/1.73/M2
GLOBULIN SER-MCNC: 2.7 GM/DL (ref 2.4–3.5)
GLUCOSE SERPL-MCNC: 92 MG/DL (ref 82–115)
HBV SURFACE AG SERPL QL IA: NONREACTIVE
HCT VFR BLD AUTO: 25.3 % (ref 42–52)
HEMOCCULT SP2 STL QL: NEGATIVE
HGB BLD-MCNC: 9 G/DL (ref 14–18)
HR MV ECHO: 128 BPM
IMM GRANULOCYTES # BLD AUTO: 0.03 X10(3)/MCL (ref 0–0.04)
IMM GRANULOCYTES NFR BLD AUTO: 0.4 %
INTERVENTRICULAR SEPTUM: 1.3 CM (ref 0.6–1.1)
LEFT ATRIUM AREA SYSTOLIC (APICAL 2 CHAMBER): 12.4 CM2
LEFT ATRIUM AREA SYSTOLIC (APICAL 4 CHAMBER): 15.3 CM2
LEFT ATRIUM SIZE: 3.7 CM
LEFT ATRIUM VOLUME MOD: 29.7 ML
LEFT VENTRICLE DIASTOLIC VOLUME: 56.6 ML
LEFT VENTRICLE END DIASTOLIC VOLUME APICAL 2 CHAMBER: 75.7 ML
LEFT VENTRICLE END DIASTOLIC VOLUME APICAL 4 CHAMBER: 67 ML
LEFT VENTRICLE END SYSTOLIC VOLUME APICAL 2 CHAMBER: 24.1 ML
LEFT VENTRICLE END SYSTOLIC VOLUME APICAL 4 CHAMBER: 35.8 ML
LEFT VENTRICLE SYSTOLIC VOLUME: 24.8 ML
LEFT VENTRICULAR INTERNAL DIMENSION IN DIASTOLE: 3.7 CM (ref 3.5–6)
LEFT VENTRICULAR MASS: 176.6 G
LV LATERAL E/E' RATIO: 9.88 M/S
LV SEPTAL E/E' RATIO: 11.29 M/S
LVED V (TEICH): 56.6 ML
LVOT MG: 4 MMHG
LVOT MV: 0.86 CM/S
LYMPHOCYTES # BLD AUTO: 1.36 X10(3)/MCL (ref 0.6–4.6)
LYMPHOCYTES NFR BLD AUTO: 20.3 %
MCH RBC QN AUTO: 29.9 PG (ref 27–31)
MCHC RBC AUTO-ENTMCNC: 35.6 G/DL (ref 33–36)
MCV RBC AUTO: 84.1 FL (ref 80–94)
MONOCYTES # BLD AUTO: 0.29 X10(3)/MCL (ref 0.1–1.3)
MONOCYTES NFR BLD AUTO: 4.3 %
MV MEAN GRADIENT: 2 MMHG
MV PEAK A VEL: 0.91 M/S
MV PEAK E VEL: 0.79 M/S
MV PEAK GRADIENT: 5 MMHG
MV STENOSIS PRESSURE HALF TIME: 90 MS
MV VALVE AREA BY CONTINUITY EQUATION: 3.07 CM2
MV VALVE AREA P 1/2 METHOD: 2.44 CM2
NEUTROPHILS # BLD AUTO: 4.86 X10(3)/MCL (ref 2.1–9.2)
NEUTROPHILS NFR BLD AUTO: 72.8 %
NOROVIRUS GI+II RNA STL QL NAA+NON-PROBE: NOT DETECTED
NRBC BLD AUTO-RTO: 0 %
OHS LV EJECTION FRACTION SIMPSONS BIPLANE MOD: 67 %
OHS QRS DURATION: 92 MS
OHS QTC CALCULATION: 488 MS
OSMOLALITY SERPL: 343 MOSM/KG (ref 280–300)
P SHIGELLOIDES DNA STL QL NAA+NON-PROBE: NOT DETECTED
PISA TR MAX VEL: 1.91 M/S
PLATELET # BLD AUTO: 225 X10(3)/MCL (ref 130–400)
PMV BLD AUTO: 10 FL (ref 7.4–10.4)
POCT GLUCOSE: 124 MG/DL (ref 70–110)
POCT GLUCOSE: 126 MG/DL (ref 70–110)
POCT GLUCOSE: 86 MG/DL (ref 70–110)
POTASSIUM SERPL-SCNC: 3.2 MMOL/L (ref 3.5–5.1)
POTASSIUM SERPL-SCNC: 3.5 MMOL/L (ref 3.5–5.1)
PROT SERPL-MCNC: 5.4 GM/DL (ref 5.8–7.6)
RBC # BLD AUTO: 3.01 X10(6)/MCL (ref 4.7–6.1)
RVA RNA STL QL NAA+NON-PROBE: NOT DETECTED
S ENT+BONG DNA STL QL NAA+NON-PROBE: NOT DETECTED
SALICYLATES SERPL-MCNC: <5 MG/DL (ref 15–30)
SAPO I+II+IV+V RNA STL QL NAA+NON-PROBE: NOT DETECTED
SHIGELLA SP+EIEC IPAH ST NAA+NON-PROBE: NOT DETECTED
SODIUM SERPL-SCNC: 127 MMOL/L (ref 136–145)
TDI LATERAL: 0.08 M/S
TDI SEPTAL: 0.07 M/S
TDI: 0.08 M/S
TR MAX PG: 15 MMHG
TRICUSPID ANNULAR PLANE SYSTOLIC EXCURSION: 2.2 CM
V CHOL+PARA+VUL DNA STL QL NAA+NON-PROBE: NOT DETECTED
V CHOLERAE DNA STL QL NAA+NON-PROBE: NOT DETECTED
WBC # BLD AUTO: 6.69 X10(3)/MCL (ref 4.5–11.5)
Y ENTEROCOL DNA STL QL NAA+NON-PROBE: NOT DETECTED

## 2024-12-01 PROCEDURE — 83930 ASSAY OF BLOOD OSMOLALITY: CPT | Performed by: STUDENT IN AN ORGANIZED HEALTH CARE EDUCATION/TRAINING PROGRAM

## 2024-12-01 PROCEDURE — 82077 ASSAY SPEC XCP UR&BREATH IA: CPT | Performed by: STUDENT IN AN ORGANIZED HEALTH CARE EDUCATION/TRAINING PROGRAM

## 2024-12-01 PROCEDURE — 51702 INSERT TEMP BLADDER CATH: CPT

## 2024-12-01 PROCEDURE — 80320 DRUG SCREEN QUANTALCOHOLS: CPT | Performed by: STUDENT IN AN ORGANIZED HEALTH CARE EDUCATION/TRAINING PROGRAM

## 2024-12-01 PROCEDURE — 87340 HEPATITIS B SURFACE AG IA: CPT | Performed by: STUDENT IN AN ORGANIZED HEALTH CARE EDUCATION/TRAINING PROGRAM

## 2024-12-01 PROCEDURE — 25000003 PHARM REV CODE 250

## 2024-12-01 PROCEDURE — 25000003 PHARM REV CODE 250: Performed by: EMERGENCY MEDICINE

## 2024-12-01 PROCEDURE — 63600175 PHARM REV CODE 636 W HCPCS: Performed by: NURSE PRACTITIONER

## 2024-12-01 PROCEDURE — 80053 COMPREHEN METABOLIC PANEL: CPT | Performed by: NURSE PRACTITIONER

## 2024-12-01 PROCEDURE — 63600175 PHARM REV CODE 636 W HCPCS: Performed by: INTERNAL MEDICINE

## 2024-12-01 PROCEDURE — 25000003 PHARM REV CODE 250: Performed by: NURSE PRACTITIONER

## 2024-12-01 PROCEDURE — 80179 DRUG ASSAY SALICYLATE: CPT | Performed by: STUDENT IN AN ORGANIZED HEALTH CARE EDUCATION/TRAINING PROGRAM

## 2024-12-01 PROCEDURE — 21400001 HC TELEMETRY ROOM

## 2024-12-01 PROCEDURE — 82272 OCCULT BLD FECES 1-3 TESTS: CPT | Performed by: NURSE PRACTITIONER

## 2024-12-01 PROCEDURE — 99233 SBSQ HOSP IP/OBS HIGH 50: CPT | Mod: ,,, | Performed by: SURGERY

## 2024-12-01 PROCEDURE — A4217 STERILE WATER/SALINE, 500 ML: HCPCS | Performed by: EMERGENCY MEDICINE

## 2024-12-01 PROCEDURE — 85025 COMPLETE CBC W/AUTO DIFF WBC: CPT | Performed by: NURSE PRACTITIONER

## 2024-12-01 PROCEDURE — 02HV33Z INSERTION OF INFUSION DEVICE INTO SUPERIOR VENA CAVA, PERCUTANEOUS APPROACH: ICD-10-PCS | Performed by: SURGERY

## 2024-12-01 PROCEDURE — 80143 DRUG ASSAY ACETAMINOPHEN: CPT | Performed by: STUDENT IN AN ORGANIZED HEALTH CARE EDUCATION/TRAINING PROGRAM

## 2024-12-01 PROCEDURE — 36415 COLL VENOUS BLD VENIPUNCTURE: CPT | Performed by: STUDENT IN AN ORGANIZED HEALTH CARE EDUCATION/TRAINING PROGRAM

## 2024-12-01 PROCEDURE — 5A1D70Z PERFORMANCE OF URINARY FILTRATION, INTERMITTENT, LESS THAN 6 HOURS PER DAY: ICD-10-PCS | Performed by: STUDENT IN AN ORGANIZED HEALTH CARE EDUCATION/TRAINING PROGRAM

## 2024-12-01 PROCEDURE — 90935 HEMODIALYSIS ONE EVALUATION: CPT

## 2024-12-01 PROCEDURE — 27000207 HC ISOLATION

## 2024-12-01 PROCEDURE — 36415 COLL VENOUS BLD VENIPUNCTURE: CPT | Performed by: NURSE PRACTITIONER

## 2024-12-01 PROCEDURE — 25000003 PHARM REV CODE 250: Performed by: INTERNAL MEDICINE

## 2024-12-01 RX ORDER — QUETIAPINE FUMARATE 25 MG/1
50 TABLET, FILM COATED ORAL NIGHTLY
Status: DISCONTINUED | OUTPATIENT
Start: 2024-12-01 | End: 2024-12-10 | Stop reason: HOSPADM

## 2024-12-01 RX ORDER — HEPARIN 100 UNIT/ML
5 SYRINGE INTRAVENOUS
Status: DISCONTINUED | OUTPATIENT
Start: 2024-12-01 | End: 2024-12-10 | Stop reason: HOSPADM

## 2024-12-01 RX ORDER — MIDODRINE HYDROCHLORIDE 5 MG/1
5 TABLET ORAL 3 TIMES DAILY
Status: DISCONTINUED | OUTPATIENT
Start: 2024-12-01 | End: 2024-12-02

## 2024-12-01 RX ORDER — SODIUM CITRATE 4 % (3 ML)
6 SYRINGE (ML) MISCELLANEOUS DAILY PRN
Status: DISCONTINUED | OUTPATIENT
Start: 2024-12-01 | End: 2024-12-01 | Stop reason: CLARIF

## 2024-12-01 RX ORDER — VANCOMYCIN HYDROCHLORIDE 125 MG/1
125 CAPSULE ORAL EVERY 6 HOURS
Status: DISCONTINUED | OUTPATIENT
Start: 2024-12-01 | End: 2024-12-10 | Stop reason: HOSPADM

## 2024-12-01 RX ORDER — HEPARIN SODIUM 5000 [USP'U]/ML
5000 INJECTION, SOLUTION INTRAVENOUS; SUBCUTANEOUS EVERY 12 HOURS
Status: DISCONTINUED | OUTPATIENT
Start: 2024-12-02 | End: 2024-12-10 | Stop reason: HOSPADM

## 2024-12-01 RX ORDER — SERTRALINE HYDROCHLORIDE 50 MG/1
50 TABLET, FILM COATED ORAL DAILY
Status: DISCONTINUED | OUTPATIENT
Start: 2024-12-01 | End: 2024-12-04

## 2024-12-01 RX ADMIN — SODIUM BICARBONATE: 84 INJECTION, SOLUTION INTRAVENOUS at 11:12

## 2024-12-01 RX ADMIN — PIPERACILLIN SODIUM AND TAZOBACTAM SODIUM 4.5 G: 4; .5 INJECTION, POWDER, LYOPHILIZED, FOR SOLUTION INTRAVENOUS at 08:12

## 2024-12-01 RX ADMIN — VANCOMYCIN HYDROCHLORIDE 125 MG: 125 CAPSULE ORAL at 11:12

## 2024-12-01 RX ADMIN — THIAMINE HYDROCHLORIDE 500 MG: 100 INJECTION, SOLUTION INTRAMUSCULAR; INTRAVENOUS at 08:12

## 2024-12-01 RX ADMIN — QUETIAPINE FUMARATE 50 MG: 25 TABLET ORAL at 08:12

## 2024-12-01 RX ADMIN — MIDODRINE HYDROCHLORIDE 5 MG: 5 TABLET ORAL at 08:12

## 2024-12-01 RX ADMIN — VANCOMYCIN HYDROCHLORIDE 125 MG: 125 CAPSULE ORAL at 12:12

## 2024-12-01 RX ADMIN — THERA TABS 1 TABLET: TAB at 08:12

## 2024-12-01 RX ADMIN — CHLORDIAZEPOXIDE HYDROCHLORIDE 50 MG: 25 CAPSULE ORAL at 02:12

## 2024-12-01 RX ADMIN — THIAMINE HYDROCHLORIDE 500 MG: 100 INJECTION, SOLUTION INTRAMUSCULAR; INTRAVENOUS at 10:12

## 2024-12-01 RX ADMIN — ACETAMINOPHEN 1000 MG: 500 TABLET ORAL at 08:12

## 2024-12-01 RX ADMIN — VANCOMYCIN HYDROCHLORIDE 125 MG: 125 CAPSULE ORAL at 05:12

## 2024-12-01 RX ADMIN — ACETAMINOPHEN 1000 MG: 500 TABLET ORAL at 05:12

## 2024-12-01 RX ADMIN — SODIUM CHLORIDE, POTASSIUM CHLORIDE, SODIUM LACTATE AND CALCIUM CHLORIDE 500 ML: 600; 310; 30; 20 INJECTION, SOLUTION INTRAVENOUS at 07:12

## 2024-12-01 RX ADMIN — SODIUM BICARBONATE: 84 INJECTION, SOLUTION INTRAVENOUS at 02:12

## 2024-12-01 RX ADMIN — SERTRALINE HYDROCHLORIDE 50 MG: 50 TABLET ORAL at 12:12

## 2024-12-01 RX ADMIN — FOLIC ACID 1 MG: 1 TABLET ORAL at 08:12

## 2024-12-01 RX ADMIN — MIDODRINE HYDROCHLORIDE 5 MG: 5 TABLET ORAL at 05:12

## 2024-12-01 RX ADMIN — THIAMINE HYDROCHLORIDE 500 MG: 100 INJECTION, SOLUTION INTRAMUSCULAR; INTRAVENOUS at 05:12

## 2024-12-01 NOTE — PROCEDURES
Central Line Insertion Procedure     Patient Name: Gabe Barnett  MRN: [id number]  YOB: 1965  Admit Date: 11/30/2024  HD#1  Date of Procedure: 12/01/2024    Procedure: Central venous catheter placement  Indications: Central venous access  Anesthesia: Local - 1% plain lidocaine  Catheter type: 12F HD Trialysis  Length: 13 cm  Insertion site: right internal jugular vein    Procedure in Detail:  After informed consent was obtained, time out was performed with all present in agreement. The patient was placed in Trendelenburg supine position with head turned to expose the insertion site. Ultrasound was used to identify the right internal jugular vein and examine for location and patency. The patient was prepped and draped in the normal sterile fashion. Local anesthetic with 1% lidocaine was administered to the insertion site. After visualization of the relevant structures, including the right internal jugular vein, an appropriate site was selected, and the introducer needle was advanced under direct vision into the right internal jugular vein while negative pressure was applied. Venous blood returned easily into the syringe. Seldinger technique was used to advance the guidewire into the vein without resistance. The introducer needle was then removed. Small skin incision was made around wire. The dilator was advanced over the guidewire into subcutaneous tissue along the tract. The central venous HD catheter was then advanced over the guidewire without difficulty and fixed at skin. The guidewire was removed. All three access ports were aspirated and flushed easily. The catheter was sutured in place with 2-0 silk suture, and central line dressing was applied under sterile conditions. Following this, the procedure was completed. Patient tolerated the procedure well.    Estimated blood loss: Minimal    Complications: None    Plan:  A post-procedure chest x-ray was ordered and will be followed up for interval  changes including pneumothorax.

## 2024-12-01 NOTE — PROGRESS NOTES
Pharmacist Renal Dose Adjustment Note    Gabe Barnett is a 59 y.o. male being treated with the medication Zosyn    Patient Data:    Vital Signs (Most Recent):  Temp: 98.4 °F (36.9 °C) (11/30/24 1800)  Pulse: 85 (11/30/24 1813)  Resp: 17 (11/30/24 1701)  BP: 122/68 (11/30/24 1800)  SpO2: 100 % (11/30/24 1701) Vital Signs (72h Range):  Temp:  [95.7 °F (35.4 °C)-98.4 °F (36.9 °C)]   Pulse:  [74-98]   Resp:  [11-25]   BP: ()/(43-74)   SpO2:  [90 %-100 %]      Recent Labs   Lab 11/30/24  1359   CREATININE 16.67*     Creatinine clearance cannot be calculated (Unknown ideal weight.)    Medication: Zosyn dose: 4.5 g frequency q8h will be changed to medication: Zosyn dose: 4.5 g frequency: q12h    Pharmacist's Name: Delisa Murphy  Pharmacist's Extension: [phone]

## 2024-12-01 NOTE — NURSING
Upon assessment, pt voiced feelings of depression, sadness, and suicidal ideations with a plan before coming here to hospital. Pt on 1:1 supervision. Placed under PEC hold on 11/30/24 @2130. Safety checks done.

## 2024-12-01 NOTE — PLAN OF CARE
Problem: Adult Inpatient Plan of Care  Goal: Plan of Care Review  12/1/2024 1726 by Isabelle Shen RN  Outcome: Progressing  12/1/2024 1725 by Isabelle Shen RN  Outcome: Progressing  Goal: Patient-Specific Goal (Individualized)  12/1/2024 1726 by Isabelle Shen RN  Outcome: Progressing  12/1/2024 1725 by Isabelle Shen RN  Outcome: Progressing  Goal: Absence of Hospital-Acquired Illness or Injury  12/1/2024 1726 by Isabelle Shen RN  Outcome: Progressing  12/1/2024 1725 by Isabelle Shen RN  Outcome: Progressing  Goal: Optimal Comfort and Wellbeing  12/1/2024 1726 by Isabelle Shen RN  Outcome: Progressing  12/1/2024 1725 by Isabelle Shen RN  Outcome: Progressing  Goal: Readiness for Transition of Care  12/1/2024 1726 by Isabelle Shen RN  Outcome: Progressing  12/1/2024 1725 by Isabelle Shen RN  Outcome: Progressing     Problem: Nausea and Vomiting  Goal: Nausea and Vomiting Relief  12/1/2024 1726 by Isabelle Shen RN  Outcome: Progressing  12/1/2024 1725 by Isabelle Shen RN  Outcome: Progressing     Problem: Suicidal Behavior  Goal: Suicidal Behavior is Absent or Managed  12/1/2024 1726 by Isabelle Shen RN  Outcome: Progressing  12/1/2024 1725 by Isabelle Shen RN  Outcome: Progressing     Problem: Suicide Risk  Goal: Absence of Self-Harm  12/1/2024 1726 by Isabelle Shen RN  Outcome: Progressing  12/1/2024 1725 by Isabelle Shen RN  Reactivated     Problem: Infection  Goal: Absence of Infection Signs and Symptoms  12/1/2024 1726 by Isabelle Shen RN  Outcome: Progressing  12/1/2024 1725 by Isabelle Shen RN  Reactivated     Problem: Hemodialysis  Goal: Safe, Effective Therapy Delivery  12/1/2024 1726 by Isabelle Shen RN  Outcome: Progressing  12/1/2024/2024 1725 by Isabelle Shen, RN  Reactivated  Goal: Effective Tissue Perfusion  12/1/2024 1726 by Isabelle Shen, RN  Outcome: Progressing  12/1/2024 1725 by Isabelle Shen,  RN  Reactivated  Goal: Absence of Infection Signs and Symptoms  12/1/2024 1726 by Isabelle Shen RN  Outcome: Progressing  12/1/2024 1725 by Isabelle Shen RN  Reactivated     Problem: Diarrhea  Goal: Effective Diarrhea Management  Reactivated

## 2024-12-01 NOTE — CONSULTS
Acute Care Surgery   Consult    Patient Name: Gabe Barnett  YOB: 1965  Date: 12/01/2024 12:50 PM  Date of Admission: 11/30/2024  HD#1  POD#* No surgery found *    PRESENTING HISTORY   Chief Complaint/Reason for Admission: HD catheter placement    History of Present Illness:  59 yr old male whose history includes atrial fibrillation (previously on Eliquis but quit taking), HTN, and NICMO presents as a surgery consult for HD access. Patient denies NVFC, CP, SOB. Patient is understanding of plan and consented for procedure, all questions answered.     Review of Systems:  12 point ROS negative except as stated in HPI    PAST HISTORY:   Past medical history:  History reviewed. No pertinent past medical history.    Past surgical history:  History reviewed. No pertinent surgical history.    Family history:  No family history on file.    Social history:  Social History     Socioeconomic History    Marital status: Single   Tobacco Use    Smoking status: Never    Smokeless tobacco: Never   Substance and Sexual Activity    Alcohol use: Yes     Alcohol/week: 40.0 standard drinks of alcohol     Types: 40 Shots of liquor per week     Comment: more than a month ago was last drink    Drug use: Never     Social Drivers of Health     Financial Resource Strain: Low Risk  (9/24/2024)    Overall Financial Resource Strain (CARDIA)     Difficulty of Paying Living Expenses: Not hard at all   Food Insecurity: No Food Insecurity (9/24/2024)    Hunger Vital Sign     Worried About Running Out of Food in the Last Year: Never true     Ran Out of Food in the Last Year: Never true   Transportation Needs: No Transportation Needs (9/24/2024)    TRANSPORTATION NEEDS     Transportation : No   Physical Activity: Inactive (9/24/2024)    Exercise Vital Sign     Days of Exercise per Week: 0 days     Minutes of Exercise per Session: 0 min   Stress: No Stress Concern Present (9/24/2024)    Citizen of [place] [employer] -  Occupational Stress Questionnaire     Feeling of Stress : Not at all   Housing Stability: Low Risk  (9/24/2024)    Housing Stability Vital Sign     Unable to Pay for Housing in the Last Year: No     Homeless in the Last Year: No     Social History     Tobacco Use   Smoking Status Never   Smokeless Tobacco Never      Social History     Substance and Sexual Activity   Alcohol Use Yes    Alcohol/week: 40.0 standard drinks of alcohol    Types: 40 Shots of liquor per week    Comment: more than a month ago was last drink        MEDICATIONS & ALLERGIES:     No current facility-administered medications on file prior to encounter.     Current Outpatient Medications on File Prior to Encounter   Medication Sig    apixaban (ELIQUIS) 5 mg Tab Take 1 tablet (5 mg total) by mouth 2 (two) times daily.    atorvastatin (LIPITOR) 40 MG tablet Take 40 mg by mouth once daily.    chlordiazepoxide (LIBRIUM) 25 MG Cap Take 1 capsule (25 mg total) by mouth every 12 (twelve) hours for 1 day, THEN 1 capsule (25 mg total) once daily for 1 day.    folic acid (FOLVITE) 1 MG tablet Take 1 tablet (1 mg total) by mouth once daily.    hydroCHLOROthiazide (HYDRODIURIL) 12.5 MG Tab Take 1 tablet (12.5 mg total) by mouth once daily.    lisinopriL 10 MG tablet Take 1 tablet (10 mg total) by mouth once daily.    metoprolol succinate (TOPROL-XL) 25 MG 24 hr tablet Take 1 tablet (25 mg total) by mouth 2 (two) times daily.    omeprazole (PRILOSEC) 20 MG capsule Take 20 mg by mouth once daily.       Allergies:   Review of patient's allergies indicates:   Allergen Reactions    Niacin Other (See Comments)     I get flushed and burn up       Scheduled Meds:   chlordiazepoxide  50 mg Oral Q6H    Followed by    [START ON 12/2/2024] chlordiazepoxide  50 mg Oral Q8H    Followed by    [START ON 12/3/2024] chlordiazepoxide  25 mg Oral Q6H    Followed by    [START ON 12/4/2024] chlordiazepoxide  25 mg Oral Q8H    Followed by    [START ON 12/5/2024] chlordiazepoxide   "25 mg Oral Q12H    Followed by    [START ON 12/6/2024] chlordiazepoxide  25 mg Oral Q24H    folic acid  1 mg Oral Daily    midodrine  5 mg Oral TID    multivitamin  1 tablet Oral Daily    [START ON 12/2/2024] mupirocin   Nasal BID    piperacillin-tazobactam (Zosyn) IV (PEDS and ADULTS) (extended infusion is not appropriate)  4.5 g Intravenous Q12H    QUEtiapine  50 mg Oral QHS    sertraline  50 mg Oral Daily    thiamine (B-1) 500 mg in D5W 100 mL IVPB  500 mg Intravenous TID    vancomycin  125 mg Oral Q6H       Continuous Infusions:   sodium bicarbonate 150 mEq in sterile water 1,000 mL infusion   Intravenous Continuous 150 mL/hr at 12/01/24 1115 Restarted at 12/01/24 1115       PRN Meds:  Current Facility-Administered Medications:     acetaminophen, 1,000 mg, Oral, Q6H PRN    aluminum-magnesium hydroxide-simethicone, 30 mL, Oral, QID PRN    bisacodyL, 10 mg, Rectal, Daily PRN    dextrose 10%, 12.5 g, Intravenous, PRN    dextrose 10%, 25 g, Intravenous, PRN    [COMPLETED] dextrose 10%, 50 g, Intravenous, Once **AND** dextrose 10%, 25 g, Intravenous, PRN **AND** [COMPLETED] insulin regular, 0.1 Units/kg (Dosing Weight), Intravenous, Once    glucagon (human recombinant), 1 mg, Intramuscular, PRN    glucose, 16 g, Oral, PRN    glucose, 24 g, Oral, PRN    heparin, porcine (PF), 5 mL, Intra-Catheter, PRN    melatonin, 6 mg, Oral, Nightly PRN    naloxone, 0.02 mg, Intravenous, PRN    ondansetron, 4 mg, Intravenous, Q4H PRN    prochlorperazine, 5 mg, Intravenous, Q6H PRN    senna-docusate 8.6-50 mg, 1 tablet, Oral, BID PRN    sodium chloride 0.9%, 10 mL, Intravenous, PRN    OBJECTIVE:   Vital Signs:  VITAL SIGNS: 24 HR MIN & MAX LAST   Temp  Min: 95.7 °F (35.4 °C)  Max: 98.4 °F (36.9 °C)  97.3 °F (36.3 °C)   BP  Min: 72/53  Max: 122/68  (!) 92/56    Pulse  Min: 73  Max: 98  73    Resp  Min: 11  Max: 25  16    SpO2  Min: 90 %  Max: 100 %  97 %      HT: 5' 11" (180.3 cm)  WT:    BMI:       Intake/output:  Intake/Output - " "Last 3 Shifts         11/29 0700 11/30 0659 11/30 0700  12/01 0659 12/01 0700  12/02 0659    P.O.  480 480    Total Intake  480 480    Urine  900 200    Stool  0     Total Output  900 200    Net  -420 +280           Urine Occurrence  1 x     Stool Occurrence  3 x             Intake/Output Summary (Last 24 hours) at 12/1/2024 1250  Last data filed at 12/1/2024 0830  Gross per 24 hour   Intake 960 ml   Output 1100 ml   Net -140 ml         Physical Exam:  General: Well developed, well nourished, no acute distress  HEENT: Normocephalic, PERRL; HD catheter in place and sealed in RIJ  CV: RR  Resp: NWOB  GI:  Abdomen soft, non-tender, non-distended, no guarding, no rebound  :  Deferred  MSK: No muscle atrophy, cyanosis, peripheral edema, moving all extremities spontaneously  Skin/wounds:  No rashes, ulcers, erythema  Neuro:  CNII-XII grossly intact, alert and oriented to person, place, and time    Labs:  Troponin:  Recent Labs     11/30/24  1359 11/30/24 2007   TROPONINI 0.034 0.028     CBC:  Recent Labs     11/30/24  1359 11/30/24 2007 12/01/24  0758   WBC 9.99 7.09 6.69   RBC 3.68* 2.98* 3.01*   HGB 11.1* 9.0* 9.0*   HCT 30.7* 24.2* 25.3*    230 225   MCV 83.4 81.2 84.1   MCH 30.2 30.2 29.9   MCHC 36.2* 37.2* 35.6     CMP:  Recent Labs     11/30/24  1359 11/30/24 2007 12/01/24  0758   CALCIUM 8.1*   < > 7.6*   ALBUMIN 3.5  --  2.7*   *   < > 127*   K 5.2*   < > 3.2*   CO2 9*   < > 16*   CL 87*   < > 82*   .0*   < > 219.4*   CREATININE 16.67*   < > 12.99*   ALKPHOS 70  --  56   ALT 10  --  9   AST 8  --  9   BILITOT 0.5  --  0.4    < > = values in this interval not displayed.     Lactic Acid:  Recent Labs     11/30/24  1359 11/30/24  1558   LACTATE 2.2 1.4     ETOH:  Recent Labs     12/01/24  0813   ETHANOL <10.0      Urine Drug Screen:  Recent Labs     11/30/24  1902   FENTANYL Negative   MDMA Negative      ABG:  No results for input(s): "PH", "PO2", "PCO2", "HCO3", "BE" in the last 168 hours. "     Diagnostic Results:  CT Head Without Contrast   Final Result      [facility] concordant.  No acute intracranial abnormality.         Electronically signed by: Jeff Brady MD   Date:    12/01/2024   Time:    08:23      X-Ray Chest 1 View   Final Result      Minimal patchy opacities in the left lung base, may be infectious or inflammatory.         Electronically signed by: Iza Hughes   Date:    12/01/2024   Time:    11:36      US Retroperitoneal Complete   Final Result      No hydronephrosis.         Electronically signed by: Reinaldo Montanez   Date:    12/01/2024   Time:    11:33      CT Abdomen Pelvis  Without Contrast   Final Result      No acute process identified in the abdomen or pelvis.      Patchy ground-glass in the lung bases suggestive of infectious or inflammatory process.         Electronically signed by: Reinaldo Montanez   Date:    11/30/2024   Time:    16:14      X-Ray Chest 1 View    (Results Pending)       ASSESSMENT & PLAN:    59 yr old male whose history includes atrial fibrillation (previously on Eliquis but quit taking), HTN, and NICMO presents as a surgery consult for HD access.    - HD catheter placed 12/1/24  - CXR fu post placement  - Standard catheter care   - Nursing informed to flush all three lumens with sodium citrate w/ 2ccs prior to use  - Surgery signing off, please call with any questions/concerns.     Ata Dominique MD  [employer] General Surgery PGY-1

## 2024-12-01 NOTE — CONSULTS
Nephrology Initial Consult Note    Patient Name: Gabe Barnett  Age: 59 y.o.  : 1965  MRN: [id number]  Admission Date: 2024    Reason for Consult:      Acute kidney injury  Self, Aaareferral    HPI:     Gabe Barnett is a 59 y.o. male who presents after being found altered.  Past medical history significant for AFib, hypertension, and nonischemic cardiomyopathy.  Patient was found in a motel tub coverage and feces with empty alcohol bottles.  Supposedly he had a planned to break 1 of the bottles and tried to kill himself by slitting his wrists.  He denies any toxic alcohol ingestions.  States that he feels depressed.  He does endorse occasional nausea, vomiting, and diarrhea.  At admission he was found to have severe acute kidney injury with BUN of 247, and creatinine of 16.  Nephrology consulted for acute kidney injury.  He denies chest pain, shortness of breath, or lower extremity edema.  Patient is overall a very poor historian.        Current Facility-Administered Medications   Medication Dose Route Frequency Provider Last Rate Last Admin    acetaminophen tablet 1,000 mg  1,000 mg Oral Q6H PRN Gudelia Mast FNP   1,000 mg at 24 0850    aluminum-magnesium hydroxide-simethicone 200-200-20 mg/5 mL suspension 30 mL  30 mL Oral QID PRN Gudelia Mast FNP        bisacodyL suppository 10 mg  10 mg Rectal Daily PRN Gudelia Mast FNP        chlordiazepoxide capsule 50 mg  50 mg Oral Q6H Gudelia Mast FNP   50 mg at 24 0209    Followed by    [START ON 2024] chlordiazepoxide capsule 50 mg  50 mg Oral Q8H Gudelia Mast FNP        Followed by    [START ON 12/3/2024] chlordiazepoxide capsule 25 mg  25 mg Oral Q6H Gudelia Mast FNP        Followed by    [START ON 2024] chlordiazepoxide capsule 25 mg  25 mg Oral Q8H Gudelia Mast FNP        Followed by    [START ON 2024] chlordiazepoxide capsule 25 mg  25 mg Oral Q12H Gudelia Mast FNP        Followed by     [START ON 12/6/2024] chlordiazepoxide capsule 25 mg  25 mg Oral Q24H Gudelia Mast, FNP        dextrose 10% bolus 125 mL 125 mL  12.5 g Intravenous PRN Gudelia Mast, FNP        dextrose 10% bolus 250 mL 250 mL  25 g Intravenous PRN Gudelia Mast, FNP        dextrose 10% bolus 250 mL 250 mL  25 g Intravenous PRN Tha Santamaria MD        folic acid tablet 1 mg  1 mg Oral Daily Gudelia Mast, FNP   1 mg at 12/01/24 0840    glucagon (human recombinant) injection 1 mg  1 mg Intramuscular PRN Gudelia Mast, FNP        glucose chewable tablet 16 g  16 g Oral PRN Gudelia Mast, FNP        glucose chewable tablet 24 g  24 g Oral PRN Gudelia Mast, FNP        melatonin tablet 6 mg  6 mg Oral Nightly PRN Gudelia Mast, FNP        midodrine tablet 5 mg  5 mg Oral TID Albert Toledo MD   5 mg at 12/01/24 0840    multivitamin tablet  1 tablet Oral Daily Gudelia Mast, FNP   1 tablet at 12/01/24 0840    [START ON 12/2/2024] mupirocin 2 % ointment   Nasal BID Linus Ivy MD        naloxone 0.4 mg/mL injection 0.02 mg  0.02 mg Intravenous PRN Gudelia Mast, SCOT        ondansetron injection 4 mg  4 mg Intravenous Q4H PRN Gudelia Mast, FNBRITTANY        piperacillin-tazobactam (ZOSYN) 4.5 g in D5W 100 mL IVPB (MB+)  4.5 g Intravenous Q12H Gudelia Mast, FNP 25 mL/hr at 12/01/24 0857 4.5 g at 12/01/24 0857    prochlorperazine injection Soln 5 mg  5 mg Intravenous Q6H PRN Gudelia Mast, FNP        senna-docusate 8.6-50 mg per tablet 1 tablet  1 tablet Oral BID PRN Gudelia Mast, FNP        sodium bicarbonate 150 mEq in sterile water 1,000 mL infusion   Intravenous Continuous Allan Davis  mL/hr at 12/01/24 0226 New Bag at 12/01/24 0226    sodium chloride 0.9% flush 10 mL  10 mL Intravenous PRN Gudelia Mast, SCOT        thiamine (B-1) 500 mg in D5W 100 mL IVPB  500 mg Intravenous TID Albert Toledo MD           No, Primary Doctor    History  reviewed. No pertinent past medical history.   History reviewed. No pertinent surgical history.   No family history on file.  Social History     Tobacco Use    Smoking status: Never    Smokeless tobacco: Never   Substance Use Topics    Alcohol use: Yes     Alcohol/week: 40.0 standard drinks of alcohol     Types: 40 Shots of liquor per week     Comment: more than a month ago was last drink     Medications Prior to Admission   Medication Sig Dispense Refill Last Dose/Taking    apixaban (ELIQUIS) 5 mg Tab Take 1 tablet (5 mg total) by mouth 2 (two) times daily. 60 tablet 1 Unknown    atorvastatin (LIPITOR) 40 MG tablet Take 40 mg by mouth once daily.   Unknown    chlordiazepoxide (LIBRIUM) 25 MG Cap Take 1 capsule (25 mg total) by mouth every 12 (twelve) hours for 1 day, THEN 1 capsule (25 mg total) once daily for 1 day. 3 capsule 0     folic acid (FOLVITE) 1 MG tablet Take 1 tablet (1 mg total) by mouth once daily. 30 tablet 1 Unknown    hydroCHLOROthiazide (HYDRODIURIL) 12.5 MG Tab Take 1 tablet (12.5 mg total) by mouth once daily. 30 tablet 1 Unknown    lisinopriL 10 MG tablet Take 1 tablet (10 mg total) by mouth once daily. 90 tablet 3 Unknown    metoprolol succinate (TOPROL-XL) 25 MG 24 hr tablet Take 1 tablet (25 mg total) by mouth 2 (two) times daily. 60 tablet 1 Unknown    omeprazole (PRILOSEC) 20 MG capsule Take 20 mg by mouth once daily.   Unknown     Review of patient's allergies indicates:   Allergen Reactions    Niacin Other (See Comments)     I get flushed and burn up            Review of Systems:     Comprehensive 10pt ROS negative except as noted per history.      Objective:       VITAL SIGNS: 24 HR MIN & MAX LAST    Temp  Min: 95.7 °F (35.4 °C)  Max: 98.4 °F (36.9 °C)  97.4 °F (36.3 °C)        BP  Min: 72/53  Max: 122/68  (!) 93/58     Pulse  Min: 74  Max: 98  83     Resp  Min: 11  Max: 25  18    SpO2  Min: 90 %  Max: 100 %  98 %      GEN:  Ill-appearing WM  HEENT: Conjunctiva anicteric, pupils equal    CV: RRR +S1,S2 without murmur  PULM: CTAB, unlabored  ABD: Soft, NT/ND abdomen with NABS  EXT: No cyanosis or edema  SKIN: Warm and dry  PSYCH: Awake, alert and slowly conversant  Dialysis access:  No dialysis access            Component Value Date/Time     (L) 12/01/2024 0758     (L) 11/30/2024 2007    K 3.2 (L) 12/01/2024 0758    K 3.5 11/30/2024 2352    CO2 16 (L) 12/01/2024 0758    CO2 9 (LL) 11/30/2024 2007    .4 (H) 12/01/2024 0758    .7 (H) 11/30/2024 2007    CREATININE 12.99 (H) 12/01/2024 0758    CREATININE 14.79 (H) 11/30/2024 2007    CALCIUM 7.6 (L) 12/01/2024 0758    CALCIUM 8.4 (L) 11/30/2024 2007    PHOS 5.5 (H) 09/28/2024 0445            Component Value Date/Time    WBC 6.69 12/01/2024 0758    WBC 7.09 11/30/2024 2007    HGB 9.0 (L) 12/01/2024 0758    HGB 9.0 (L) 11/30/2024 2007    HCT 25.3 (L) 12/01/2024 0758    HCT 24.2 (L) 11/30/2024 2007     12/01/2024 0758     11/30/2024 2007             CT Head Without Contrast   Final Result      [facility] concordant.  No acute intracranial abnormality.         Electronically signed by: Jeff Brady MD   Date:    12/01/2024   Time:    08:23      X-Ray Chest 1 View         US Retroperitoneal Complete         CT Abdomen Pelvis  Without Contrast   Final Result      No acute process identified in the abdomen or pelvis.      Patchy ground-glass in the lung bases suggestive of infectious or inflammatory process.         Electronically signed by: Reinaldo Montanez   Date:    11/30/2024   Time:    16:14          Assessment / Plan:       There are no hospital problems to display for this patient.      Acute kidney injury - unknown baseline renal function  Severe azotemia  Hyperkalemia  Severe anion gap metabolic acidosis  Suicidal ideation    Plan:  Patient needs to be initiated on hemodialysis for severe anion gap metabolic acidosis, and azotemia.  Place Wade catheter for strict intake and output.  With anion gap of 33 and  concerned that he could have a toxic alcohol ingestion, however, he denies any history of this.  Check osmolar gap, ethylene glycol, acetaminophen, salicylate level. He could also just be from severe azotemia.  Consult general surgery for placement of temporary dialysis catheter.  Plan to dialyze today after dialysis access has been obtained.  Need to repeat BMP 1-1/2 hours into dialysis to ensure that acidosis has improved.  If acidosis not improving we will need to continue his dialysis treatment to correct acidosis.    Leno Paredes DO  Nephrology  [facility]  Clinic number: [phone]      Note was created with the assistance of electronic Dictation Services.  Note was reviewed to decrease errors, however, these may still be present.  Please contact me about questions or concerns with the dictation.

## 2024-12-01 NOTE — PLAN OF CARE
Problem: Adult Inpatient Plan of Care  Goal: Plan of Care Review  Outcome: Progressing  Goal: Patient-Specific Goal (Individualized)  Outcome: Progressing  Goal: Absence of Hospital-Acquired Illness or Injury  Outcome: Progressing  Goal: Optimal Comfort and Wellbeing  Outcome: Progressing  Goal: Readiness for Transition of Care  Outcome: Progressing     Problem: Nausea and Vomiting  Goal: Nausea and Vomiting Relief  Outcome: Progressing     Problem: Suicidal Behavior  Goal: Suicidal Behavior is Absent or Managed  Outcome: Progressing

## 2024-12-01 NOTE — NURSING
12/01/24 1642   Post-Hemodialysis Assessment   Blood Volume Processed (Liters) 30 L   Duration of Treatment 120 minutes   Total UF (mL) 0 mL   Patient Response to Treatment Tolerated well   Post-Hemodialysis Comments Tx completed as ordered, pt ran 2hrs, NET removed= 0L. CVC functioned poorly, positional, constant AP/ alarms regardless of pt position or line reversal @ ordered 250 BFR..

## 2024-12-01 NOTE — H&P
"[facility] Medicine - H&P Note    Patient Name: Gabe Barnett  : 1965  MRN: [id number]  PCP: Vivienne, Primary Doctor  Admitting Phys[facility]  [facility]  Admission Class: IP- Inpatient   Code status: Full    Allergies   Niacin    Chief Complaint   depressed    History of Present Illness   59 yr old male whose history includes atrial fibrillation (previously on Eliquis but quit taking), HTN, and NICMO. At the time of my exam patient is drowsy after getting PO serax but easily aroused and oriented x 3. Cooperative. Very tearful.  Initially not very forthcoming regarding events leading up to ER admission but after some time he provided more information. EMS reports he was found in tub covered in feces surrounded by empty alcohol bottles. Patient reports he quit drinking one month ago. Nurse reports he was tremulous and agitated on arrival to unit and this has improved with Serax. Has been living in a hotel. Reports he got in the tub with intention of breaking one of the alcohol bottles and "cutting my vein". Admits to feeling very depressed. Has not taken any of his medications in several months. Reports several month history of vomiting and watery diarrhea. Unsure if he's noticed any blood in emesis or stool.     VS on arrival: T 95.7, P 98, R 18, B/P 9646, Sats 98% on room air. Initial labs: WBC 9.99, Hgb 11.1, Hgb 30.7, platelets 246, Na 133, K 5.2, Cl 87, bicarb 9, , creatinine 16.67, glucose 131, AST 8, ALT 10, lipase 161. Lactic acid, CPK, ETOH, TSH, ammonia, Troponin normal.  CT abdomen and pelvis shows patchy ground-glass in the lung bases suggestive of infectious or inflammatory process.  No acute process identified in the abdomen or pelvis.  Retroperitoneal ultrasound unremarkable.      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 No pertinent surgical history.    Social History   Denies tobacco or illicit drug use. Reports drinking more than a fifth of whisky daily until one month ago. Living in hotel.     Screening for Social Drivers for health:  Patient screened for food insecurity, housing instability, transportation needs, utility difficulties, and interpersonal safety (select all that apply as identified as concern)  [x]Housing or Food  []Transportation Needs  []Utility Difficulties  []Interpersonal safety  []None    Family History   Reviewed and negative    Home Medications     Prior to Admission medications    Medication Sig Start Date End Date Taking? Authorizing Provider   apixaban (ELIQUIS) 5 mg Tab Take 1 tablet (5 mg total) by mouth 2 (two) times daily. 9/27/24  Yes Jarod Gomez,            folic acid (FOLVITE) 1 MG tablet Take 1 tablet (1 mg total) by mouth once daily. 9/28/24 9/28/25  Jarod Gomez,    hydroCHLOROthiazide (HYDRODIURIL) 12.5 MG Tab Take 1 tablet (12.5 mg total) by mouth once daily. 9/28/24 9/28/25  Jarod Gomez,    Omeprazole 20 mg daily                lisinopriL 10 MG tablet Take 1 tablet (10 mg total) by mouth once daily. 9/27/24 9/27/25  Jarod Gomez DO   metoprolol succinate (TOPROL-XL) 25 MG 24 hr tablet Take 1 tablet (25 mg total) by mouth 2 (two) times daily. 9/27/24 9/27/25  Jarod Gomez,    Lipitor 40 mg daily             Physical Exam   Vital Signs  Temp:  [95.7 °F (35.4 °C)-98.4 °F (36.9 °C)]   Pulse:  [74-98]   Resp:  [11-25]   BP: ()/(43-74)   SpO2:  [90 %-100 %]    General: Appears comfortable  HEENT: NC/AT  Neck:  No JVD  Chest: CTABL  CVS: Regular rhythm. Normal S1/S2.  Abdomen: nondistended, normoactive BS, soft and non-tender.  MSK: No obvious deformity or joint swelling  Skin: Warm and dry  Neuro: AAOx3, no focal neurological deficit  Psych: Cooperative, tearful, depressed    Labs  "    Recent Labs     11/30/24  1359   WBC 9.99   RBC 3.68*   HGB 11.1*   HCT 30.7*   MCV 83.4   MCH 30.2   MCHC 36.2*   RDW 13.7        No results for input(s): "PROTIME", "INR", "PTT", "D-DIMER", "FERRITIN", "IRON", "TRANS", "TIBC", "LABIRON", "[id number]", "FOLATE", "LDH", "HAPTOGLOBIN", "RETICCNTAUTO", "RETABS", "PERIPSMEAREV" in the last 72 hours.   Recent Labs     11/30/24  1359   *   K 5.2*   CO2 9*   .0*   CREATININE 16.67*   EGFRNORACEVR 3   GLUCOSE 131*   CALCIUM 8.1*   MG 2.20   ALBUMIN 3.5   GLOBULIN 2.9   ALKPHOS 70   ALT 10   AST 8   BILITOT 0.5   LIPASE 161*   TSH 0.932     No results for input(s): "LACTIC" in the last 72 hours.  Recent Labs     11/30/24  1359   CPK 91   TROPONINI 0.034        Microbiology Results (last 7 days)       ** No results found for the last 168 hours. **           Imaging   US Retroperitoneal Complete  START OF REPORT:  Technique: Gray scale and color doppler images of the kidneys and bladder were performed with select images being submitted for interpretation.    Clinical history: ED 27 acute renal failure.    Kidneys:  Right Kidney: Unremarkable right kidney with no stones cysts masses or hydronephrosis identified. The right kidney measures 11 cm sagittal by 7 cm by 6.2 cm with a volume of 250.4 cc.  Left Kidney: Unremarkable left kidney with no stones cysts masses or hydronephrosis identified. The left kidney measures 12.4cm sagittal by 6.7 cm by 5.9 cm with a volume of 256 cc.    Urinary bladder: Unremarkable appearing urinary bladder. Pre-void volume measures 236 cc.    Miscellaneous: The visualized inferior vena cava appears to be within normal limits. The aorta is obscured by bowel gas.    Impression:  1. Unremarkable ultrasound of the kidneys and bladder as above.  CT Abdomen Pelvis  Without Contrast  Narrative: EXAMINATION:  CT ABDOMEN PELVIS WITHOUT CONTRAST    CLINICAL HISTORY:  Abdominal pain, acute, nonlocalized;    TECHNIQUE:  CT imaging of the " abdomen and pelvis without intravenous contrast. Axial, coronal and sagittal reformatted images reviewed. Dose length product is 695 mGycm. Automatic exposure control, adjustment of mA/kV or iterative reconstruction technique used to limit radiation dose.    COMPARISON:  No relevant comparison studies available at the time of dictation.    FINDINGS:  Assessment of the visceral organs and vasculature is limited by the lack of IV contrast.    Liver/biliary: No concerning hepatic findings. No radiodense gallstone or biliary dilatation appreciated.    Pancreas: Normal.    Spleen: Normal.    Adrenals: Normal.    Genitourinary: No hydronephrosis or defined urinary stone. Bladder within normal limits.    Stomach/bowel: No bowel obstruction. Normal appendix. No discernible bowel inflammation.    Lymph nodes and peritoneum: No pathologically enlarged lymph node identified with noncontrast technique. No ascites or free air.    Vasculature: Aortoiliac atherosclerosis.    Abdominal wall: Normal.    Lung bases: Patchy ground-glass in both lung bases.    Bones: No acute osseous findings.  Impression: No acute process identified in the abdomen or pelvis.    Patchy ground-glass in the lung bases suggestive of infectious or inflammatory process.    Electronically signed by: Reinaldo Montanez  Date:    11/30/2024  Time:    16:14    Assessment & Plan     Acute renal failure  - retroperitoneal ultrasound - unremarkable kidneys and bladder  - avoid all nephrotoxic medications and monitor  - strict I&O  - appreciate Nephrology's recommendations    Hyperkalemia  - repeat K up to 6.2 from 5.2  - Lokelma 10 g PO now    Questionable syncope vs alcohol intoxication   - CT head stat - negative for acute intracranial findings  - no focal deficits on exam    Chronic vomiting and diarrhea  - ongoing for several months  - stool studies pending   - Hgb decreased to 9.0 from 11.1 with IVF   - check stool for occult blood  - Consider GI consult      Normocytic anemia  - stool occult blood pending  - monitor HH    Metabolic acidosis secondary to NOE  - Continue bicarb infusion at 150 ml/hr    Suspected aspiration pneumonia per CT abd/pelvis - chest x-ray clear   - Chest x-ray clear   - MRSA PCR - neg  - Flu/Covid/RSV - neg  - Respiratory panel - neg  - Blood cultures x 2   - continue zosyn for now    Paroxysmal Atrial fibrillation - currently in a sinus rhythm  - previously on Eliquis but has not taken in several months  - monitor telemetry    Chronic NICMO - HFrEF - EF 49% - Lexiscan 9/25/24 - stable  - 9/25/24 -nuclear stress test - normal myocardial perfusion with no evidence of myocardial ischemia or infarction  - no clinical evidence of decompensated   - repeat echo pending    Hypotension with Hx HTN - stable  - initially hypotensive in ED. B/P improved with IVF  - hold all B/P meds for now    Alcohol abuse  - denies ETOH use in past month but EMS reports multiple bottles alcohol in hotel room. Displaying symptoms of alcohol withdrawals.   - CIWA protocol with tapering dose of Serax  - Thiamine 500 mg IV x one  - Daily PO thiamine, folic acid,MVI    Depression with suicidal ideation  - placed under PEC  - Psych consulted     VTE Prophylaxis: SCDs     I, Gudelia Mast, FNP-BC have discussed this patients case with Dr. Santamaria who agrees with the diagnosis and treatment plan.

## 2024-12-01 NOTE — PSYCH EVALUATION
"12/1/2024  Gabe Barnett   1965   [id number]            Psychiatry Inpatient Admission Note    Date of Admission: 11/30/2024  1:04 PM    Current Legal Status: Physician's Emergency Certificate    Chief Complaint: Depression with SI    SUBJECTIVE:   History of Present Illness:   Gabe Barnett is a 59-year-old male admitted under PEC at [facility] with a history of alcohol abuse, seizures, atrial fibrillation (previously on Eliquis, discontinued), hypertension, and non-ischemic cardiomyopathy (NICMO). He was brought to the ED by EMS after being found in a motel bathtub covered in feces and surrounded by empty alcohol bottles. The patient reports quitting alcohol one month ago but admits to a history of heavy drinking. Upon arrival at the ED, he was tremulous and agitated, which improved with Serax administration. He revealed that he entered the bathtub with the intention of breaking an alcohol bottle to "cut my vein," indicating recent suicidal ideation. He endorses feeling very depressed and has not taken any prescribed medications in several months. The patient also reports a history of vomiting and watery diarrhea over the past several months, though he is unsure if there has been any blood in his emesis or stool. He has been living in a hotel and experiencing significant functional and emotional decline.    Evaluation:  During todays evaluation, the patient was observed lying in bed with no acute distress, under 1:1 supervision. When asked about his current mood and events leading to his hospitalization, he stated, "I'm too tired to think about it right now," but endorsed ongoing depression and a lifelong struggle with the condition. He recalls prior benefit from Zoloft and Seroquel but reports not being on any medications for the past five years. He is amenable to restarting this regimen.    Assessment:  The patient presents with severe depressive symptoms and recent suicidal ideation, likely exacerbated by alcohol " abuse, noncompliance with medical treatment, and prolonged psychosocial stress. His medical history, including atrial fibrillation and NICMO, increases the complexity of his care. While he denies acute distress currently, his history of suicidal intent necessitates close monitoring and comprehensive psychiatric care.    Prognosis:  Guarded, given the patients complex medical and psychiatric history, recent suicidal intent, and lack of adherence to treatment. Improvement is anticipated with medication initiation, structured care, and close monitoring during hospitalization and after discharge.      Past Medical/Surgical History:   History reviewed. No pertinent past medical history.  History reviewed. No pertinent surgical history.      Allergies:   Review of patient's allergies indicates:   Allergen Reactions    Niacin Other (See Comments)     I get flushed and burn up       Scheduled Meds:    chlordiazepoxide  50 mg Oral Q6H    Followed by    [START ON 12/2/2024] chlordiazepoxide  50 mg Oral Q8H    Followed by    [START ON 12/3/2024] chlordiazepoxide  25 mg Oral Q6H    Followed by    [START ON 12/4/2024] chlordiazepoxide  25 mg Oral Q8H    Followed by    [START ON 12/5/2024] chlordiazepoxide  25 mg Oral Q12H    Followed by    [START ON 12/6/2024] chlordiazepoxide  25 mg Oral Q24H    folic acid  1 mg Oral Daily    midodrine  5 mg Oral TID    multivitamin  1 tablet Oral Daily    [START ON 12/2/2024] mupirocin   Nasal BID    piperacillin-tazobactam (Zosyn) IV (PEDS and ADULTS) (extended infusion is not appropriate)  4.5 g Intravenous Q12H    QUEtiapine  50 mg Oral QHS    sertraline  50 mg Oral Daily    thiamine (B-1) 500 mg in D5W 100 mL IVPB  500 mg Intravenous TID    vancomycin  125 mg Oral Q6H      PRN Meds:   Current Facility-Administered Medications:     acetaminophen, 1,000 mg, Oral, Q6H PRN    aluminum-magnesium hydroxide-simethicone, 30 mL, Oral, QID PRN    bisacodyL, 10 mg, Rectal, Daily PRN    dextrose 10%,  "12.5 g, Intravenous, PRN    dextrose 10%, 25 g, Intravenous, PRN    [COMPLETED] dextrose 10%, 50 g, Intravenous, Once **AND** dextrose 10%, 25 g, Intravenous, PRN **AND** [COMPLETED] insulin regular, 0.1 Units/kg (Dosing Weight), Intravenous, Once    glucagon (human recombinant), 1 mg, Intramuscular, PRN    glucose, 16 g, Oral, PRN    glucose, 24 g, Oral, PRN    heparin, porcine (PF), 5 mL, Intra-Catheter, PRN    melatonin, 6 mg, Oral, Nightly PRN    naloxone, 0.02 mg, Intravenous, PRN    ondansetron, 4 mg, Intravenous, Q4H PRN    prochlorperazine, 5 mg, Intravenous, Q6H PRN    senna-docusate 8.6-50 mg, 1 tablet, Oral, BID PRN    sodium chloride 0.9%, 10 mL, Intravenous, PRN    sodium citrate, 6 mL, Intra-Catheter, Daily PRN   Psychotherapeutics (From admission, onward)      Start     Stop Route Frequency Ordered    12/06/24 0215  chlordiazepoxide capsule 25 mg  (Detoxification with Chlordiazepoxide, Alcohol Withdrawal)        Placed in "Followed by" Linked Group    12/07/24 0214 Oral Every 24 hours (non-standard times) 11/30/24 1909 12/05/24 0215  chlordiazepoxide capsule 25 mg  (Detoxification with Chlordiazepoxide, Alcohol Withdrawal)        Placed in "Followed by" Linked Group    12/06/24 0214 Oral Every 12 hours (non-standard times) 11/30/24 1909 12/04/24 0215  chlordiazepoxide capsule 25 mg  (Detoxification with Chlordiazepoxide, Alcohol Withdrawal)        Placed in "Followed by" Linked Group    12/05/24 0214 Oral Every 8 hours (non-standard times) 11/30/24 1909 12/03/24 0215  chlordiazepoxide capsule 25 mg  (Detoxification with Chlordiazepoxide, Alcohol Withdrawal)        Placed in "Followed by" Linked Group    12/04/24 0214 Oral Every 6 hours (non-standard times) 11/30/24 1909 12/02/24 0215  chlordiazepoxide capsule 50 mg  (Detoxification with Chlordiazepoxide, Alcohol Withdrawal)        Placed in "Followed by" Linked Group    12/03/24 0214 Oral Every 8 hours (non-standard times) 11/30/24 3359    " "12/01/24 2100  QUEtiapine tablet 50 mg         -- Oral Nightly 12/01/24 1227    12/01/24 1230  sertraline tablet 50 mg         -- Oral Daily 12/01/24 1227    12/01/24 0215  chlordiazepoxide capsule 50 mg  (Detoxification with Chlordiazepoxide, Alcohol Withdrawal)        Placed in "Followed by" Linked Group    12/02/24 0214 Oral Every 6 hours (non-standard times) 11/30/24 1909              OBJECTIVE:   Medical Review Of Systems:  Pertinent items are noted in HPI.    Vitals   Vitals:    12/01/24 1107   BP: (!) 92/56   Pulse: 73   Resp: 16   Temp: 97.3 °F (36.3 °C)        Labs/Imaging/Studies:   Recent Results (from the past 48 hours)   Comprehensive metabolic panel    Collection Time: 11/30/24  1:59 PM   Result Value Ref Range    Sodium 133 (L) 136 - 145 mmol/L    Potassium 5.2 (H) 3.5 - 5.1 mmol/L    Chloride 87 (L) 98 - 107 mmol/L    CO2 9 (LL) 23 - 31 mmol/L    Glucose 131 (H) 82 - 115 mg/dL    Blood Urea Nitrogen 247.0 (H) 8.4 - 25.7 mg/dL    Creatinine 16.67 (H) 0.72 - 1.25 mg/dL    Calcium 8.1 (L) 8.8 - 10.0 mg/dL    Protein Total 6.4 5.8 - 7.6 gm/dL    Albumin 3.5 3.4 - 4.8 g/dL    Globulin 2.9 2.4 - 3.5 gm/dL    Albumin/Globulin Ratio 1.2 1.1 - 2.0 ratio    Bilirubin Total 0.5 <=1.5 mg/dL    ALP 70 40 - 150 unit/L    ALT 10 0 - 55 unit/L    AST 8 5 - 34 unit/L    eGFR 3 mL/min/1.73/m2    Anion Gap 37.0 mEq/L    BUN/Creatinine Ratio 15    Ethanol    Collection Time: 11/30/24  1:59 PM   Result Value Ref Range    Ethanol Level <10.0 <=10.0 mg/dL   Lactic acid, plasma    Collection Time: 11/30/24  1:59 PM   Result Value Ref Range    Lactic Acid Level 2.2 0.5 - 2.2 mmol/L   Lipase    Collection Time: 11/30/24  1:59 PM   Result Value Ref Range    Lipase Level 161 (H) <=60 U/L   Magnesium    Collection Time: 11/30/24  1:59 PM   Result Value Ref Range    Magnesium Level 2.20 1.60 - 2.60 mg/dL   Troponin I    Collection Time: 11/30/24  1:59 PM   Result Value Ref Range    Troponin-I 0.034 0.000 - 0.045 ng/mL   TSH    " Collection Time: 11/30/24  1:59 PM   Result Value Ref Range    TSH 0.932 0.350 - 4.940 uIU/mL   CBC with Differential    Collection Time: 11/30/24  1:59 PM   Result Value Ref Range    WBC 9.99 4.50 - 11.50 x10(3)/mcL    RBC 3.68 (L) 4.70 - 6.10 x10(6)/mcL    Hgb 11.1 (L) 14.0 - 18.0 g/dL    Hct 30.7 (L) 42.0 - 52.0 %    MCV 83.4 80.0 - 94.0 fL    MCH 30.2 27.0 - 31.0 pg    MCHC 36.2 (H) 33.0 - 36.0 g/dL    RDW 13.7 11.5 - 17.0 %    Platelet 246 130 - 400 x10(3)/mcL    MPV 9.4 7.4 - 10.4 fL    Neut % 91.9 %    Lymph % 5.1 %    Mono % 2.1 %    Eos % 0.2 %    Basophil % 0.2 %    Lymph # 0.51 (L) 0.6 - 4.6 x10(3)/mcL    Neut # 9.18 2.1 - 9.2 x10(3)/mcL    Mono # 0.21 0.1 - 1.3 x10(3)/mcL    Eos # 0.02 0 - 0.9 x10(3)/mcL    Baso # 0.02 <=0.2 x10(3)/mcL    IG# 0.05 (H) 0 - 0.04 x10(3)/mcL    IG% 0.5 %    NRBC% 0.0 %   CPK    Collection Time: 11/30/24  1:59 PM   Result Value Ref Range    Creatine Kinase 91 30 - 200 U/L   Lactic Acid, Plasma    Collection Time: 11/30/24  3:58 PM   Result Value Ref Range    Lactic Acid Level 1.4 0.5 - 2.2 mmol/L   Urinalysis, Reflex to Urine Culture    Collection Time: 11/30/24  7:02 PM    Specimen: Urine   Result Value Ref Range    Color, UA Light-Yellow Yellow, Light-Yellow, Colorless, Straw, Dark-Yellow    Appearance, UA Clear Clear    Specific Gravity, UA 1.014 1.005 - 1.030    pH, UA 5.0 5.0 - 8.5    Protein, UA Trace (A) Negative    Glucose, UA Normal Negative, Normal    Ketones, UA Negative Negative    Blood, UA Trace (A) Negative    Bilirubin, UA Negative Negative    Urobilinogen, UA Normal 0.2, 1.0, Normal    Nitrites, UA Negative Negative    Leukocyte Esterase, UA Negative Negative    RBC, UA 0-5 None Seen, 0-2, 3-5, 0-5 /HPF    WBC, UA 6-10 (A) None Seen, 0-2, 3-5, 0-5 /HPF    Bacteria, UA None Seen None Seen, Trace /HPF    Squamous Epithelial Cells, UA Trace None Seen, Trace, Rare /HPF    Mucous, UA Trace (A) None Seen /LPF    Hyaline Casts, UA 3-5 (A) None Seen /lpf   Drug  Screen, Urine    Collection Time: 11/30/24  7:02 PM   Result Value Ref Range    Amphetamines, Urine Negative Negative    Barbiturates, Urine Negative Negative    Benzodiazepine, Urine Positive (A) Negative    Cannabinoids, Urine Negative Negative    Cocaine, Urine Negative Negative    Fentanyl, Urine Negative Negative    MDMA, Urine Negative Negative    Opiates, Urine Negative Negative    Phencyclidine, Urine Negative Negative    pH, Urine 5.0 3.0 - 11.0    Specific Gravity, Urine Auto 1.014 1.001 - 1.035   MRSA PCR    Collection Time: 11/30/24  7:45 PM   Result Value Ref Range    MRSA PCR Screen Not Detected Not Detected   COVID/RSV/FLU A&B PCR    Collection Time: 11/30/24  7:45 PM   Result Value Ref Range    Influenza A PCR Not Detected Not Detected    Influenza B PCR Not Detected Not Detected    Respiratory Syncytial Virus PCR Not Detected Not Detected    SARS-CoV-2 PCR Not Detected Not Detected, Negative   Respiratory Panel    Collection Time: 11/30/24  7:45 PM   Result Value Ref Range    Adenovirus Not Detected Not Detected    Coronavirus 229E Not Detected Not Detected    Coronavirus HKU1 Not Detected Not Detected    Coronavirus NL63 Not Detected Not Detected    Coronavirus OC43 PCR, Common Cold Virus Not Detected Not Detected    Human Metapneumovirus Not Detected Not Detected    Parainfluenza Virus 1 Not Detected Not Detected    Parainfluenza Virus 2 Not Detected Not Detected    Parainfluenza Virus 3 Not Detected Not Detected    Parainfluenza Virus 4 Not Detected Not Detected    Bordetella pertussis (ptxP) Not Detected Not Detected    Chlamydia pneumoniae Not Detected Not Detected    Mycoplasma pneumoniae Not Detected Not Detected    Human Rhinovirus/Enterovirus Not Detected Not Detected    Bordetella parapertussis ([id number]) Not Detected Not Detected   Basic Metabolic Panel    Collection Time: 11/30/24  8:07 PM   Result Value Ref Range    Sodium 130 (L) 136 - 145 mmol/L    Potassium 6.2 (H) 3.5 - 5.1 mmol/L     Chloride 88 (L) 98 - 107 mmol/L    CO2 9 (LL) 23 - 31 mmol/L    Glucose 132 (H) 82 - 115 mg/dL    Blood Urea Nitrogen 237.7 (H) 8.4 - 25.7 mg/dL    Creatinine 14.79 (H) 0.72 - 1.25 mg/dL    BUN/Creatinine Ratio 16     Calcium 8.4 (L) 8.8 - 10.0 mg/dL    Anion Gap 33.0 mEq/L    eGFR 3 mL/min/1.73/m2   Magnesium    Collection Time: 11/30/24  8:07 PM   Result Value Ref Range    Magnesium Level 2.20 1.60 - 2.60 mg/dL   CBC with Differential    Collection Time: 11/30/24  8:07 PM   Result Value Ref Range    WBC 7.09 4.50 - 11.50 x10(3)/mcL    RBC 2.98 (L) 4.70 - 6.10 x10(6)/mcL    Hgb 9.0 (L) 14.0 - 18.0 g/dL    Hct 24.2 (L) 42.0 - 52.0 %    MCV 81.2 80.0 - 94.0 fL    MCH 30.2 27.0 - 31.0 pg    MCHC 37.2 (H) 33.0 - 36.0 g/dL    RDW 13.5 11.5 - 17.0 %    Platelet 230 130 - 400 x10(3)/mcL    MPV 9.6 7.4 - 10.4 fL    Neut % 87.8 %    Lymph % 9.0 %    Mono % 2.4 %    Eos % 0.1 %    Basophil % 0.1 %    Lymph # 0.64 0.6 - 4.6 x10(3)/mcL    Neut # 6.22 2.1 - 9.2 x10(3)/mcL    Mono # 0.17 0.1 - 1.3 x10(3)/mcL    Eos # 0.01 0 - 0.9 x10(3)/mcL    Baso # 0.01 <=0.2 x10(3)/mcL    IG# 0.04 0 - 0.04 x10(3)/mcL    IG% 0.6 %    NRBC% 0.0 %   Troponin I    Collection Time: 11/30/24  8:07 PM   Result Value Ref Range    Troponin-I 0.028 0.000 - 0.045 ng/mL   BNP    Collection Time: 11/30/24  8:07 PM   Result Value Ref Range    Natriuretic Peptide 46.5 <=100.0 pg/mL   Ammonia    Collection Time: 11/30/24  9:34 PM   Result Value Ref Range    Ammonia Level 27.7 18.0 - 72.0 umol/L   Echo    Collection Time: 11/30/24 10:13 PM   Result Value Ref Range    Pollard's Biplane MOD Ejection Fraction 67 %    A2C EF 65 %    A4C EF 69 %    LVOT stroke volume 94.5 cm3    LVIDd 3.7 3.5 - 6.0 cm    LV Systolic Volume 24.80 mL    LV ESV A2C 24.10 mL    LV Diastolic Volume 56.60 mL    LV ESV A4C 35.80 mL    LV EDV A2C [id number] mL    LV EDV A4C 67.00 mL    Left Ventricular End Diastolic Volume by Teichholz Method 56.60 mL    IVS 1.3 (A) 0.6 - 1.1 cm     LVOT diameter 2.0 cm    LVOT area 3.1 cm2    Left Ventricle Relative Wall Thickness 0.76 cm    PW 1.4 (A) 0.6 - 1.1 cm    LV mass 176.6 g    MV Peak E Albert 0.79 m/s    TDI LATERAL 0.08 m/s    TDI SEPTAL 0.07 m/s    E/E' ratio 10.53 m/s    MV Peak A Albert 0.91 m/s    TR Max Albert 1.91 m/s    E/A ratio 0.87     E wave deceleration time 214.00 msec    LV SEPTAL E/E' RATIO 11.29 m/s    LV LATERAL E/E' RATIO 9.88 m/s    LVOT peak albert 1.5 m/s    Left Ventricular Outflow Tract Mean Velocity 0.86 cm/s    Left Ventricular Outflow Tract Mean Gradient 4.00 mmHg    TAPSE 2.20 cm    LA size 3.70 cm    LA Vol (MOD) 29.70 mL    AV mean gradient 5.0 mmHg    AV peak gradient 9.0 mmHg    Ao peak albert 1.5 m/s    Ao VTI 29.4 cm    LVOT peak VTI 30.1 cm    AV valve area 3.2 cm²    AV Velocity Ratio 1.00     AV index (prosthetic) 1.02     SKYLAR by Velocity Ratio 3.1 cm²    MV mean gradient 2 mmHg    MV peak gradient 5 mmHg    MV stenosis pressure 1/2 time 90.00 ms    MV valve area p 1/2 method 2.44 cm2    MV valve area by continuity eq 3.07 cm2    MV VTI 30.8 cm    Triscuspid Valve Regurgitation Peak Gradient 15 mmHg    Mean e' 0.08 m/s    LA area A4C 15.30 cm2    LA area A2C 12.40 cm2    Mitral Valve Heart Rate 128 bpm   POCT glucose    Collection Time: 11/30/24 10:39 PM   Result Value Ref Range    POCT Glucose 223 (H) 70 - 110 mg/dL   POCT glucose    Collection Time: 11/30/24 11:19 PM   Result Value Ref Range    POCT Glucose 328 (H) 70 - 110 mg/dL   GI Panel    Collection Time: 11/30/24 11:27 PM   Result Value Ref Range    CAMPYLOBACTER Not Detected Not Detected    PLESIOMONAS SHIGELLOIDES Not Detected Not Detected    SALMONELLA Not Detected Not Detected    Vibrio sp. Not Detected Not Detected    VIBRIO CHOLERAE Not Detected Not Detected    YERSINIA ENTEROCOLITICA Not Detected Not Detected    ENTEROAGGREGATIVE E. COLI (EAEC) Not Detected Not Detected    ENTEROPATHOGENIC E. COLI (EPEC) Not Detected Not Detected    Enterotoxigenic E. coli  (ETEC) Not Detected Not Detected    SHIGA-LIKE TOXIN-PRODUCING E. COLI (STEC) Not Detected Not Detected    Shigella/Enteroinvasive E. coli (EIEC) Not Detected Not Detected    CRYPTOSPORIDIUM Not Detected Not Detected    Cyclospora cayetanensis Not Detected Not Detected    Entamoeba histolytica Not Detected Not Detected    Giardia lamblia Not Detected Not Detected    Adenovirus F 40/41 Not Detected Not Detected    Astrovirus Not Detected Not Detected    Norovirus GI/GII Not Detected Not Detected    Rotavirus A Not Detected Not Detected    Sapovirus Not Detected Not Detected   Clostridium Diff Toxin, A & B, EIA    Collection Time: 11/30/24 11:31 PM    Specimen: Stool   Result Value Ref Range    Clostridium Difficile GDH Antigen Positive (A) Negative    Clostridium Difficile Toxin A/B Positive (A) Negative   Occult Blood, Stool 1st Specimen    Collection Time: 11/30/24 11:31 PM   Result Value Ref Range    Stool Color 1 Brown     Stool Consistancy 1 soft     Occult Blood Stool 1 Negative Negative   Potassium    Collection Time: 11/30/24 11:52 PM   Result Value Ref Range    Potassium 3.5 3.5 - 5.1 mmol/L   POCT glucose    Collection Time: 12/01/24  5:35 AM   Result Value Ref Range    POCT Glucose 86 70 - 110 mg/dL   Comprehensive Metabolic Panel    Collection Time: 12/01/24  7:58 AM   Result Value Ref Range    Sodium 127 (L) 136 - 145 mmol/L    Potassium 3.2 (L) 3.5 - 5.1 mmol/L    Chloride 82 (L) 98 - 107 mmol/L    CO2 16 (L) 23 - 31 mmol/L    Glucose 92 82 - 115 mg/dL    Blood Urea Nitrogen 219.4 (H) 8.4 - 25.7 mg/dL    Creatinine 12.99 (H) 0.72 - 1.25 mg/dL    Calcium 7.6 (L) 8.8 - 10.0 mg/dL    Protein Total 5.4 (L) 5.8 - 7.6 gm/dL    Albumin 2.7 (L) 3.4 - 4.8 g/dL    Globulin 2.7 2.4 - 3.5 gm/dL    Albumin/Globulin Ratio 1.0 (L) 1.1 - 2.0 ratio    Bilirubin Total 0.4 <=1.5 mg/dL    ALP 56 40 - 150 unit/L    ALT 9 0 - 55 unit/L    AST 9 5 - 34 unit/L    eGFR 4 mL/min/1.73/m2    Anion Gap 29.0 mEq/L    BUN/Creatinine  "Ratio 17    CBC with Differential    Collection Time: 12/01/24  7:58 AM   Result Value Ref Range    WBC 6.69 4.50 - 11.50 x10(3)/mcL    RBC 3.01 (L) 4.70 - 6.10 x10(6)/mcL    Hgb 9.0 (L) 14.0 - 18.0 g/dL    Hct 25.3 (L) 42.0 - 52.0 %    MCV 84.1 80.0 - 94.0 fL    MCH 29.9 27.0 - 31.0 pg    MCHC 35.6 33.0 - 36.0 g/dL    RDW 13.6 11.5 - 17.0 %    Platelet 225 130 - 400 x10(3)/mcL    MPV 10.0 7.4 - 10.4 fL    Neut % 72.8 %    Lymph % 20.3 %    Mono % 4.3 %    Eos % 1.8 %    Basophil % 0.4 %    Lymph # 1.36 0.6 - 4.6 x10(3)/mcL    Neut # 4.86 2.1 - 9.2 x10(3)/mcL    Mono # 0.29 0.1 - 1.3 x10(3)/mcL    Eos # 0.12 0 - 0.9 x10(3)/mcL    Baso # 0.03 <=0.2 x10(3)/mcL    IG# 0.03 0 - 0.04 x10(3)/mcL    IG% 0.4 %    NRBC% 0.0 %   Salicylate Level    Collection Time: 12/01/24  7:58 AM   Result Value Ref Range    Salicylate Level <5.0 (L) 15.0 - 30.0 mg/dL   Acetaminophen Level    Collection Time: 12/01/24  7:58 AM   Result Value Ref Range    Acetaminophen Level <3.0 (L) 10.0 - 30.0 ug/ml   Ethanol    Collection Time: 12/01/24  8:13 AM   Result Value Ref Range    Ethanol Level <10.0 <=10.0 mg/dL   Osmolality, Serum    Collection Time: 12/01/24  8:13 AM   Result Value Ref Range    Osmolality 343 (HH) 280 - 300 mOsm/kg      No results found for: "PHENYTOIN", "PHENOBARB", "VALPROATE", "CBMZ"        Psychiatric Mental Status Exam:  General Appearance: appears stated age, well developed and nourished, adequately groomed and appropriately dressed, in no acute distress  Arousal: drowsy  Behavior: cooperative, friendly, polite, appropriate eye-contact, under good behavioral control  Movements and Motor Activity: no abnormal involuntary movements noted; no tics, no tremors, no akathisia, no dystonia, no evidence of tardive dyskinesia; no psychomotor agitation or retardation  Orientation: intact; oriented fully to person, place, time and situation  Speech: increased latency of response, delayed  Mood: Depressed and Guarded  Affect: " constricted  Thought Process: linear  Associations: intact, no loosening of associations  Thought Content and Perceptions: + suicidal ideation  Recent and Remote Memory: intact; per interview/observation with patient  Attention and Concentration: intact; per interview/observation with patient  Fund of Knowledge: intact; based on history, vocabulary, fund of knowledge, syntax, grammar, and content  Insight: questionable; based on understanding of severity of illness and HPI  Judgment: questionable; based on patient's behavior and HPI        ASSESSMENT/PLAN:   Diagnoses:  MOOD DISORDERS; Major Depressive Disorder, 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 No pertinent past medical history.       Problem lists and Management Plans:    Medication Management:  Initiate Zoloft for treatment of depression, starting at a low dose and titrating as tolerated.  Restart Seroquel for mood stabilization and adjunctive management of depressive symptoms, starting at a low dose with careful monitoring.    Safety and Monitoring:  Maintain 1:1 supervision to ensure patient safety given his recent suicidal ideation.  Monitor for any signs of worsening depression, suicidal thoughts, or withdrawal symptoms.    Discharge and Placement Planning:  Arrange for psychiatric placement upon medical stabilization to ensure continued care in a structured environment.    -Will attempt to obtain outside psychiatric records if available    - to assist with aftercare planning and collateral  -Continue inpatient treatment as evidenced by suicidal ideation      Estimated Disposition:  Psychiatric hospitalization    Aaron Kay [id number]

## 2024-12-02 LAB
ALBUMIN SERPL-MCNC: 2.6 G/DL (ref 3.4–4.8)
ALBUMIN/GLOB SERPL: 1 RATIO (ref 1.1–2)
ALP SERPL-CCNC: 50 UNIT/L (ref 40–150)
ALT SERPL-CCNC: 8 UNIT/L (ref 0–55)
ANION GAP SERPL CALC-SCNC: 16 MEQ/L
AST SERPL-CCNC: 10 UNIT/L (ref 5–34)
BASOPHILS # BLD AUTO: 0.03 X10(3)/MCL
BASOPHILS NFR BLD AUTO: 0.6 %
BILIRUB SERPL-MCNC: 0.3 MG/DL
BUN SERPL-MCNC: 121.2 MG/DL (ref 8.4–25.7)
CALCIUM SERPL-MCNC: 7.3 MG/DL (ref 8.8–10)
CHLORIDE SERPL-SCNC: 90 MMOL/L (ref 98–107)
CO2 SERPL-SCNC: 28 MMOL/L (ref 23–31)
COLOR STL: NORMAL
CONSISTENCY STL: NORMAL
CREAT SERPL-MCNC: 5.78 MG/DL (ref 0.72–1.25)
CREAT/UREA NIT SERPL: 21
EOSINOPHIL # BLD AUTO: 0.14 X10(3)/MCL (ref 0–0.9)
EOSINOPHIL NFR BLD AUTO: 2.8 %
ERYTHROCYTE [DISTWIDTH] IN BLOOD BY AUTOMATED COUNT: 13.3 % (ref 11.5–17)
GFR SERPLBLD CREATININE-BSD FMLA CKD-EPI: 10 ML/MIN/1.73/M2
GLOBULIN SER-MCNC: 2.7 GM/DL (ref 2.4–3.5)
GLUCOSE SERPL-MCNC: 113 MG/DL (ref 82–115)
HCT VFR BLD AUTO: 24 % (ref 42–52)
HEMOCCULT SP3 STL QL: NEGATIVE
HGB BLD-MCNC: 8.8 G/DL (ref 14–18)
IMM GRANULOCYTES # BLD AUTO: 0.02 X10(3)/MCL (ref 0–0.04)
IMM GRANULOCYTES NFR BLD AUTO: 0.4 %
LYMPHOCYTES # BLD AUTO: 1.08 X10(3)/MCL (ref 0.6–4.6)
LYMPHOCYTES NFR BLD AUTO: 21.5 %
MAGNESIUM SERPL-MCNC: 1.5 MG/DL (ref 1.6–2.6)
MCH RBC QN AUTO: 30.1 PG (ref 27–31)
MCHC RBC AUTO-ENTMCNC: 36.7 G/DL (ref 33–36)
MCV RBC AUTO: 82.2 FL (ref 80–94)
MONOCYTES # BLD AUTO: 0.2 X10(3)/MCL (ref 0.1–1.3)
MONOCYTES NFR BLD AUTO: 4 %
NEUTROPHILS # BLD AUTO: 3.56 X10(3)/MCL (ref 2.1–9.2)
NEUTROPHILS NFR BLD AUTO: 70.7 %
NRBC BLD AUTO-RTO: 0 %
PHOSPHATE SERPL-MCNC: 5.5 MG/DL (ref 2.3–4.7)
PLATELET # BLD AUTO: 196 X10(3)/MCL (ref 130–400)
PMV BLD AUTO: 9.8 FL (ref 7.4–10.4)
POCT GLUCOSE: 110 MG/DL (ref 70–110)
POCT GLUCOSE: 110 MG/DL (ref 70–110)
POCT GLUCOSE: 121 MG/DL (ref 70–110)
POCT GLUCOSE: 122 MG/DL (ref 70–110)
POCT GLUCOSE: 87 MG/DL (ref 70–110)
POTASSIUM SERPL-SCNC: 3.2 MMOL/L (ref 3.5–5.1)
PROT SERPL-MCNC: 5.3 GM/DL (ref 5.8–7.6)
RBC # BLD AUTO: 2.92 X10(6)/MCL (ref 4.7–6.1)
SODIUM SERPL-SCNC: 134 MMOL/L (ref 136–145)
WBC # BLD AUTO: 5.03 X10(3)/MCL (ref 4.5–11.5)

## 2024-12-02 PROCEDURE — 83735 ASSAY OF MAGNESIUM: CPT | Performed by: NURSE PRACTITIONER

## 2024-12-02 PROCEDURE — 25000242 PHARM REV CODE 250 ALT 637 W/ HCPCS: Performed by: INTERNAL MEDICINE

## 2024-12-02 PROCEDURE — 80053 COMPREHEN METABOLIC PANEL: CPT | Performed by: NURSE PRACTITIONER

## 2024-12-02 PROCEDURE — 36415 COLL VENOUS BLD VENIPUNCTURE: CPT | Performed by: NURSE PRACTITIONER

## 2024-12-02 PROCEDURE — 25000003 PHARM REV CODE 250: Performed by: INTERNAL MEDICINE

## 2024-12-02 PROCEDURE — 85025 COMPLETE CBC W/AUTO DIFF WBC: CPT | Performed by: NURSE PRACTITIONER

## 2024-12-02 PROCEDURE — 25000003 PHARM REV CODE 250: Performed by: STUDENT IN AN ORGANIZED HEALTH CARE EDUCATION/TRAINING PROGRAM

## 2024-12-02 PROCEDURE — 27000207 HC ISOLATION

## 2024-12-02 PROCEDURE — 82272 OCCULT BLD FECES 1-3 TESTS: CPT | Performed by: NURSE PRACTITIONER

## 2024-12-02 PROCEDURE — 25000003 PHARM REV CODE 250

## 2024-12-02 PROCEDURE — 25000003 PHARM REV CODE 250: Performed by: NURSE PRACTITIONER

## 2024-12-02 PROCEDURE — 63600175 PHARM REV CODE 636 W HCPCS: Performed by: INTERNAL MEDICINE

## 2024-12-02 PROCEDURE — 84100 ASSAY OF PHOSPHORUS: CPT | Performed by: INTERNAL MEDICINE

## 2024-12-02 PROCEDURE — 21400001 HC TELEMETRY ROOM

## 2024-12-02 RX ORDER — CETIRIZINE HYDROCHLORIDE 10 MG/1
10 TABLET ORAL DAILY
Status: DISCONTINUED | OUTPATIENT
Start: 2024-12-02 | End: 2024-12-10 | Stop reason: HOSPADM

## 2024-12-02 RX ORDER — THIAMINE HCL 100 MG
100 TABLET ORAL 2 TIMES DAILY
Status: DISCONTINUED | OUTPATIENT
Start: 2024-12-03 | End: 2024-12-10 | Stop reason: HOSPADM

## 2024-12-02 RX ORDER — FLUTICASONE PROPIONATE 50 MCG
2 SPRAY, SUSPENSION (ML) NASAL 2 TIMES DAILY
Status: DISCONTINUED | OUTPATIENT
Start: 2024-12-02 | End: 2024-12-02

## 2024-12-02 RX ORDER — SODIUM CHLORIDE 9 MG/ML
INJECTION, SOLUTION INTRAVENOUS CONTINUOUS
Status: DISCONTINUED | OUTPATIENT
Start: 2024-12-02 | End: 2024-12-03

## 2024-12-02 RX ORDER — POTASSIUM CHLORIDE 20 MEQ/1
40 TABLET, EXTENDED RELEASE ORAL 2 TIMES DAILY
Status: COMPLETED | OUTPATIENT
Start: 2024-12-02 | End: 2024-12-02

## 2024-12-02 RX ORDER — POTASSIUM CHLORIDE 20 MEQ/1
40 TABLET, EXTENDED RELEASE ORAL ONCE
Status: COMPLETED | OUTPATIENT
Start: 2024-12-02 | End: 2024-12-02

## 2024-12-02 RX ORDER — LANOLIN ALCOHOL/MO/W.PET/CERES
400 CREAM (GRAM) TOPICAL ONCE
Status: COMPLETED | OUTPATIENT
Start: 2024-12-02 | End: 2024-12-02

## 2024-12-02 RX ORDER — THIAMINE HCL 100 MG
100 TABLET ORAL DAILY
Status: DISCONTINUED | OUTPATIENT
Start: 2024-12-03 | End: 2024-12-02

## 2024-12-02 RX ORDER — CHLORDIAZEPOXIDE HYDROCHLORIDE 25 MG/1
25 CAPSULE, GELATIN COATED ORAL 3 TIMES DAILY
Status: DISCONTINUED | OUTPATIENT
Start: 2024-12-02 | End: 2024-12-03

## 2024-12-02 RX ORDER — PSEUDOEPHEDRINE HCL 30 MG
60 TABLET ORAL 2 TIMES DAILY
Status: COMPLETED | OUTPATIENT
Start: 2024-12-02 | End: 2024-12-04

## 2024-12-02 RX ORDER — FLUTICASONE PROPIONATE 50 MCG
2 SPRAY, SUSPENSION (ML) NASAL 2 TIMES DAILY
Status: DISCONTINUED | OUTPATIENT
Start: 2024-12-02 | End: 2024-12-10 | Stop reason: HOSPADM

## 2024-12-02 RX ADMIN — CETIRIZINE HYDROCHLORIDE 10 MG: 10 TABLET, FILM COATED ORAL at 04:12

## 2024-12-02 RX ADMIN — MUPIROCIN: 20 OINTMENT TOPICAL at 08:12

## 2024-12-02 RX ADMIN — PIPERACILLIN SODIUM AND TAZOBACTAM SODIUM 4.5 G: 4; .5 INJECTION, POWDER, LYOPHILIZED, FOR SOLUTION INTRAVENOUS at 08:12

## 2024-12-02 RX ADMIN — MIDODRINE HYDROCHLORIDE 7.5 MG: 5 TABLET ORAL at 08:12

## 2024-12-02 RX ADMIN — POTASSIUM CHLORIDE 40 MEQ: 1500 TABLET, EXTENDED RELEASE ORAL at 03:12

## 2024-12-02 RX ADMIN — FOLIC ACID 1 MG: 1 TABLET ORAL at 08:12

## 2024-12-02 RX ADMIN — CHLORDIAZEPOXIDE HYDROCHLORIDE 25 MG: 25 CAPSULE ORAL at 08:12

## 2024-12-02 RX ADMIN — SODIUM CHLORIDE: 9 INJECTION, SOLUTION INTRAVENOUS at 03:12

## 2024-12-02 RX ADMIN — PIPERACILLIN SODIUM AND TAZOBACTAM SODIUM 4.5 G: 4; .5 INJECTION, POWDER, LYOPHILIZED, FOR SOLUTION INTRAVENOUS at 09:12

## 2024-12-02 RX ADMIN — THIAMINE HYDROCHLORIDE 500 MG: 100 INJECTION, SOLUTION INTRAMUSCULAR; INTRAVENOUS at 08:12

## 2024-12-02 RX ADMIN — ACETAMINOPHEN 1000 MG: 500 TABLET ORAL at 04:12

## 2024-12-02 RX ADMIN — SERTRALINE HYDROCHLORIDE 50 MG: 50 TABLET ORAL at 08:12

## 2024-12-02 RX ADMIN — CHLORDIAZEPOXIDE HYDROCHLORIDE 25 MG: 25 CAPSULE ORAL at 03:12

## 2024-12-02 RX ADMIN — VANCOMYCIN HYDROCHLORIDE 125 MG: 125 CAPSULE ORAL at 05:12

## 2024-12-02 RX ADMIN — SODIUM CHLORIDE: 9 INJECTION, SOLUTION INTRAVENOUS at 11:12

## 2024-12-02 RX ADMIN — POTASSIUM CHLORIDE 40 MEQ: 1500 TABLET, EXTENDED RELEASE ORAL at 08:12

## 2024-12-02 RX ADMIN — MIDODRINE HYDROCHLORIDE 7.5 MG: 5 TABLET ORAL at 03:12

## 2024-12-02 RX ADMIN — Medication 400 MG: at 08:12

## 2024-12-02 RX ADMIN — QUETIAPINE FUMARATE 50 MG: 25 TABLET ORAL at 08:12

## 2024-12-02 RX ADMIN — HEPARIN SODIUM 5000 UNITS: 5000 INJECTION, SOLUTION INTRAVENOUS; SUBCUTANEOUS at 08:12

## 2024-12-02 RX ADMIN — VANCOMYCIN HYDROCHLORIDE 125 MG: 125 CAPSULE ORAL at 07:12

## 2024-12-02 RX ADMIN — THERA TABS 1 TABLET: TAB at 08:12

## 2024-12-02 RX ADMIN — THIAMINE HYDROCHLORIDE 500 MG: 100 INJECTION, SOLUTION INTRAMUSCULAR; INTRAVENOUS at 03:12

## 2024-12-02 RX ADMIN — PSEUDOEPHEDRINE HCL 60 MG: 30 TABLET, FILM COATED ORAL at 05:12

## 2024-12-02 RX ADMIN — VANCOMYCIN HYDROCHLORIDE 125 MG: 125 CAPSULE ORAL at 12:12

## 2024-12-02 RX ADMIN — FLUTICASONE PROPIONATE 100 MCG: 50 SPRAY, METERED NASAL at 05:12

## 2024-12-02 RX ADMIN — VANCOMYCIN HYDROCHLORIDE 125 MG: 125 CAPSULE ORAL at 11:12

## 2024-12-02 NOTE — PROGRESS NOTES
"[facility] Hospital Medicine Progress Note        Chief Complaint: Inpatient Follow-up     HPI:   59 yr old male with PMHx of Alcohol abuse, Seizures, atrial fibrillation (previously on Eliquis but quit taking), HTN, and NICMO was brought to the ED by  EMS after he was found in a Motel in tub covered in feces surrounded by empty alcohol bottles. Patient reports he quit drinking one month ago. In the ED he was tremulous and agitated on arrival that  had  improved with Serax. Has been living in a hotel. Reports he got in the tub with intention of breaking one of the alcohol bottles and "cutting my vein". Admits to feeling very depressed. Has not taken any of his medications in several months. Reports several month history of vomiting and watery diarrhea. Unsure if he's noticed any blood in emesis or stool.      VS on arrival: T 95.7, P 98, R 18, BP 96/46, Sats 98% on room air. Initial labs: WBC 9.99, Hgb 11.1, Plt 246, Na 133, K 5.2, Cl 87, bicarb 9, , creatinine 16.67, glucose 131, AST 8, ALT 10, lipase 161. Lactic acid 2.2, CPK, ETOH, TSH, ammonia, Troponin normal.  CT abdomen and pelvis shows patchy ground-glass in the lung bases suggestive of infectious or inflammatory process.  No acute process identified in the abdomen or pelvis.  Retroperitoneal ultrasound unremarkable. Pt was admitted to  service . Nephrology was consulted to assist. Pt was placed under PEC due to suicidal ideation and attempt. Psych consulted.     Pt was started on Calcium gluconate, D10 with IV insulin and Lokelma per hyperkalemia protocol. Aggressive IV hydration with bicarb drip, high dose thiamine, Librium taper, folic acid and multivitamin. Blood cultures were drawn and Zosyn initiated for presumed aspiration pneumonia. Stool was collected for C.diff toxins and came back positive. Initiated on oral Vancomycin. Pt remained with profound metabolic acidosis with large AG and azotemia. Nephrology " consulted General Surgery for temporary HD catheter and Hemodialysis initiated.       Interval Hx:   Pt was seen at beside. Sitter in place   He is awake, alert and oriented but not very interactive. Denies any specific c/o at this time.   Sitter reported he ate all breakfast that was served.     Afebrile, was hypotensive earlier . Bolus 500 ml NS given and Midodrine initiated.   Most recent labs - Na 127, K 3.2, bicarb 16, , Cr 12.9, serum osmolality 343. BNP 46      Case was discussed with patient's nurse and  on the floor.    Objective/physical exam:  General: In no acute distress, afebrile, on RA  Chest: Clear to auscultation bilaterally  Heart: RRR, +S1, S2, no appreciable murmur  Abdomen: Soft, nontender, BS +  MSK: Warm, no lower extremity edema, no clubbing or cyanosis  Neurologic: Alert and oriented    VITAL SIGNS: 24 HRS MIN & MAX LAST   Temp  Min: 97.2 °F (36.2 °C)  Max: 97.7 °F (36.5 °C) 97.2 °F (36.2 °C)   BP  Min: 86/44  Max: 101/58 (!) 101/58   Pulse  Min: 71  Max: 83  78   Resp  Min: 16  Max: 18 16   SpO2  Min: 97 %  Max: 99 % 98 %     I have reviewed the following labs:  Recent Labs   Lab 11/30/24  1359 11/30/24 2007 12/01/24  0758   WBC 9.99 7.09 6.69   RBC 3.68* 2.98* 3.01*   HGB 11.1* 9.0* 9.0*   HCT 30.7* 24.2* 25.3*   MCV 83.4 81.2 84.1   MCH 30.2 30.2 29.9   MCHC 36.2* 37.2* 35.6   RDW 13.7 13.5 13.6    230 225   MPV 9.4 9.6 10.0     Recent Labs   Lab 11/30/24  1359 11/30/24 2007 11/30/24 2352 12/01/24  0758   * 130*  --  127*   K 5.2* 6.2* 3.5 3.2*   CL 87* 88*  --  82*   CO2 9* 9*  --  16*   .0* 237.7*  --  219.4*   CREATININE 16.67* 14.79*  --  12.99*   CALCIUM 8.1* 8.4*  --  7.6*   MG 2.20 2.20  --   --    ALBUMIN 3.5  --   --  2.7*   ALKPHOS 70  --   --  56   ALT 10  --   --  9   AST 8  --   --  9   BILITOT 0.5  --   --  0.4     Microbiology Results (last 7 days)       Procedure Component Value Units Date/Time    Blood Culture [[id number]]   (Normal) Collected: 11/30/24 2007    Order Status: Completed Specimen: Blood, Venous Updated: 12/01/24 2102     Blood Culture No Growth At 24 Hours    Blood Culture [[id number]]  (Normal) Collected: 11/30/24 2007    Order Status: Completed Specimen: Blood, Venous Updated: 12/01/24 2102     Blood Culture No Growth At 24 Hours    Clostridium Diff Toxin, A & B, EIA [[id number]]  (Abnormal) Collected: 11/30/24 2331    Order Status: Completed Specimen: Stool Updated: 12/01/24 0840     Clostridium Difficile GDH Antigen Positive     Clostridium Difficile Toxin A/B Positive             See below for Radiology    Assessment/Plan:  Acute kidney injury with profound azotemia, POA  Hyperkalemia due to NOE, POA  Large AG profound metabolic acidosis, POA  Reported 2 mos H/O vomiting and diarrhea   C.diff infection and diarrhea, POA  Suspected Aspiration Pneumonia, POA  Hyponatremia , POA  Hypotension due to severe volume depletion, POA  Alcohol abuse and dependency   Substance abuse mood disorder   Major depression and Suicidal ideation, POA  PEC status , POA      Hx- PAF, HTN, NICMO    Plan-  Pt started on hemodialysis to correct profound azotemia and severe AG metabolic acidosis.   Fluid management per Nephrology   Librium taper and CIWA scale for alcohol withdrawal   Parenteral Thiamine 500 mg IV tid x 48h then transitioned to oral   Continue other supportive treatment   Course of Zosyn for presumed aspiration pneumonitis   Oral vancomycin initiated for C.diff infection and diarrhea   PEC continued   Psych initiated Sertraline and Seroquel   Monitor course     Critical care note:  Critical care diagnosis: hypotension requiring IVF bolus. Severe azotemia and metabolic acidosis required RRT  Critical care interventions: Hands-on evaluation, review of labs/radiographs/records and discussion with patient and family if present  Critical care time spent: 35 minutes      VTE prophylaxis: Heparin     Patient condition:   Fair    Anticipated discharge and Disposition:     TBD    All diagnosis and differential diagnosis have been reviewed; assessment and plan has been documented; I have personally reviewed the labs and test results that are presently available; I have reviewed the patients medication list; I have reviewed the consulting providers response and recommendations. I have reviewed or attempted to review medical records based upon their availability    All of the patient's questions have been  addressed and answered. Patient's is agreeable to the above stated plan. I will continue to monitor closely and make adjustments to medical management as needed.    Portions of this note dictated using EMR integrated voice recognition software, and may be subject to voice recognition errors not corrected at proofreading. Please contact writer for clarification if needed.   _____________________________________________________________________    Malnutrition Status:  Nutrition consulted. Most recent weight and BMI monitored-     Measurements:  Wt Readings from Last 1 Encounters:   09/26/24 107 kg (235 lb 14.3 oz)   Body mass index is 32.9 kg/m².    Patient has been screened and assessed by RD.    Malnutrition Type:  Context:    Level:      Malnutrition Characteristic Summary:       Interventions/Recommendations (treatment strategy):        Scheduled Med:   chlordiazepoxide  50 mg Oral Q6H    Followed by    [START ON 12/2/2024] chlordiazepoxide  50 mg Oral Q8H    Followed by    [START ON 12/3/2024] chlordiazepoxide  25 mg Oral Q6H    Followed by    [START ON 12/4/2024] chlordiazepoxide  25 mg Oral Q8H    Followed by    [START ON 12/5/2024] chlordiazepoxide  25 mg Oral Q12H    Followed by    [START ON 12/6/2024] chlordiazepoxide  25 mg Oral Q24H    folic acid  1 mg Oral Daily    midodrine  5 mg Oral TID    multivitamin  1 tablet Oral Daily    [START ON 12/2/2024] mupirocin   Nasal BID    piperacillin-tazobactam (Zosyn) IV (PEDS and  ADULTS) (extended infusion is not appropriate)  4.5 g Intravenous Q12H    QUEtiapine  50 mg Oral QHS    sertraline  50 mg Oral Daily    thiamine (B-1) 500 mg in D5W 100 mL IVPB  500 mg Intravenous TID    vancomycin  125 mg Oral Q6H      Continuous Infusions:   sodium bicarbonate 150 mEq in sterile water 1,000 mL infusion   Intravenous Continuous 150 mL/hr at 12/01/24 1115 Restarted at 12/01/24 1115      PRN Meds:    Current Facility-Administered Medications:     acetaminophen, 1,000 mg, Oral, Q6H PRN    aluminum-magnesium hydroxide-simethicone, 30 mL, Oral, QID PRN    bisacodyL, 10 mg, Rectal, Daily PRN    dextrose 10%, 12.5 g, Intravenous, PRN    dextrose 10%, 25 g, Intravenous, PRN    [COMPLETED] dextrose 10%, 50 g, Intravenous, Once **AND** dextrose 10%, 25 g, Intravenous, PRN **AND** [COMPLETED] insulin regular, 0.1 Units/kg (Dosing Weight), Intravenous, Once    glucagon (human recombinant), 1 mg, Intramuscular, PRN    glucose, 16 g, Oral, PRN    glucose, 24 g, Oral, PRN    heparin, porcine (PF), 5 mL, Intra-Catheter, PRN    melatonin, 6 mg, Oral, Nightly PRN    naloxone, 0.02 mg, Intravenous, PRN    ondansetron, 4 mg, Intravenous, Q4H PRN    prochlorperazine, 5 mg, Intravenous, Q6H PRN    senna-docusate 8.6-50 mg, 1 tablet, Oral, BID PRN    sodium chloride 0.9%, 10 mL, Intravenous, PRN         Albert Toledo MD  Department of Hospital Medicine   [facility]   12/01/2024                  Exam under anesthesia, colposcopy, LEEP, ECC

## 2024-12-02 NOTE — PROGRESS NOTES
Nephrology consult follow up note    HPI:      Gabe Barnett is a 59 y.o. male who presents after being found altered. Past medical history significant for AFib, hypertension, and nonischemic cardiomyopathy. Patient was found in a motel tub coverage and feces with empty alcohol bottles. Supposedly he had a planned to break 1 of the bottles and tried to kill himself by slitting his wrists. He denies any toxic alcohol ingestions. States that he feels depressed. He does endorse occasional nausea, vomiting, and diarrhea. At admission he was found to have severe acute kidney injury with BUN of 247, and creatinine of 16. Nephrology consulted for acute kidney injury. He denies chest pain, shortness of breath, or lower extremity edema. Patient is overall a very poor historian.  Patient was urgently dialyzed after admission due to severe azotemia.    Interval history:     12/02/2024  No acute events overnight.  Patient more interactive today.  Dialyzed yesterday.  Making good urine output, 3.8 L yesterday.     Review of Systems:     Comprehensive 10pt ROS negative except as noted per history.    Past medical, family, surgical, and social history reviewed and unchanged from initial consult note.     Objective:       VITAL SIGNS: 24 HR MIN & MAX LAST    Temp  Min: 97.2 °F (36.2 °C)  Max: 98.1 °F (36.7 °C)  97.4 °F (36.3 °C)        BP  Min: 83/48  Max: 119/81  119/81     Pulse  Min: 68  Max: 80  68     Resp  Min: 16  Max: 20  20    SpO2  Min: 95 %  Max: 98 %  96 %      GEN:  Ill-appearing WM  HEENT: Conjunctiva anicteric, pupils equal   CV: RRR +S1,S2 without murmur  PULM: CTAB, unlabored  ABD: Soft, NT/ND abdomen with NABS  EXT: No cyanosis or edema  SKIN: Warm and dry  PSYCH: Awake, alert and conversant.  Dialysis access:  Right IJ Vas-Cath            Component Value Date/Time     (L) 12/02/2024 0338     (L) 12/01/2024 0758    K 3.2 (L) 12/02/2024 0338    K 3.2 (L) 12/01/2024 0758    CO2 28 12/02/2024 0338    CO2  16 (L) 12/01/2024 0758    .2 (H) 12/02/2024 0338    .4 (H) 12/01/2024 0758    CREATININE 5.78 (H) 12/02/2024 0338    CREATININE 12.99 (H) 12/01/2024 0758    CALCIUM 7.3 (L) 12/02/2024 0338    CALCIUM 7.6 (L) 12/01/2024 0758    PHOS 5.5 (H) 12/02/2024 0338            Component Value Date/Time    WBC 5.03 12/02/2024 0338    WBC 6.69 12/01/2024 0758    HGB 8.8 (L) 12/02/2024 0338    HGB 9.0 (L) 12/01/2024 0758    HCT 24.0 (L) 12/02/2024 0338    HCT 25.3 (L) 12/01/2024 0758     12/02/2024 0338     12/01/2024 0758         Imaging reviewed      Assessment / Plan:       There are no hospital problems to display for this patient.      Acute kidney injury - unknown baseline renal function  Severe azotemia  Hyperkalemia  Severe anion gap metabolic acidosis  Suicidal ideation     Plan:  Renal function has significantly improved with dialysis yesterday.  He was also making very good urine output.  He maybe starting to have renal recovery.  It was hard interpret lab work since he had dialysis yesterday.  We will hold off on dialysis today, and repeat labs in a.m..    Leno Paredes DO  Nephrology  [facility]  Clinic number: [phone]      Note was created with the assistance of electronic Dictation Services.  Note was reviewed to decrease errors, however, these may still be present.  Please contact me about questions or concerns with the dictation.

## 2024-12-02 NOTE — PROGRESS NOTES
Inpatient Nutrition Evaluation    Admit Date: 11/30/2024   Total duration of encounter: 2 days   Patient Age: 59 y.o.    Nutrition Recommendation/Prescription     -Continue Renal Diet as tolerated.   -Continue MVI, folic acid, and thiamine as medically feasible.   -Monitor wt, labs, and intake.     Nutrition Assessment     Chart Review    Reason Seen: malnutrition screening tool (MST)    Malnutrition Screening Tool Results   Have you recently lost weight without trying?: Yes: 2-13 lbs  Have you been eating poorly because of a decreased appetite?: Yes   MST Score: 2   Diagnosis:  Acute kidney injury with profound azotemia, POA  Hyperkalemia due to NOE, POA  Large AG profound metabolic acidosis, POA  Reported 2 mos H/O vomiting and diarrhea   C.diff infection and diarrhea, POA  Suspected Aspiration Pneumonia, POA  Hyponatremia  Hypotension due to severe volume depletion  Alcohol abuse and dependency   Substance abuse mood disorder   Major depression and Suicidal ideation  PEC status    Relevant Medical History:  Alcohol abuse, Seizures, atrial fibrillation (previously on Eliquis but quit taking), HTN, and NICMO     Scheduled Medications:  chlordiazepoxide, 25 mg, TID  folic acid, 1 mg, Daily  heparin (porcine), 5,000 Units, Q12H  midodrine, 7.5 mg, TID  multivitamin, 1 tablet, Daily  mupirocin, , BID  piperacillin-tazobactam (Zosyn) IV (PEDS and ADULTS) (extended infusion is not appropriate), 4.5 g, Q12H  QUEtiapine, 50 mg, QHS  sertraline, 50 mg, Daily  thiamine (B-1) 500 mg in D5W 100 mL IVPB, 500 mg, TID  vancomycin, 125 mg, Q6H    Continuous Infusions:  sodium bicarbonate 150 mEq in sterile water 1,000 mL infusion, Last Rate: 150 mL/hr at 12/01/24 2308    PRN Medications:   Current Facility-Administered Medications:     acetaminophen, 1,000 mg, Oral, Q6H PRN    aluminum-magnesium hydroxide-simethicone, 30 mL, Oral, QID PRN    bisacodyL, 10 mg, Rectal, Daily PRN    dextrose 10%, 12.5 g, Intravenous, PRN    dextrose  10%, 25 g, Intravenous, PRN    glucagon (human recombinant), 1 mg, Intramuscular, PRN    glucose, 16 g, Oral, PRN    glucose, 24 g, Oral, PRN    heparin, porcine (PF), 5 mL, Intra-Catheter, PRN    melatonin, 6 mg, Oral, Nightly PRN    naloxone, 0.02 mg, Intravenous, PRN    ondansetron, 4 mg, Intravenous, Q4H PRN    prochlorperazine, 5 mg, Intravenous, Q6H PRN    senna-docusate 8.6-50 mg, 1 tablet, Oral, BID PRN    sodium chloride 0.9%, 10 mL, Intravenous, PRN    Recent Labs   Lab 11/30/24  1359 11/30/24  2007 11/30/24  2134 11/30/24  2352 12/01/24  0758 12/02/24  0338   * 130*  --   --  127* 134*   K 5.2* 6.2*  --  3.5 3.2* 3.2*   CALCIUM 8.1* 8.4*  --   --  7.6* 7.3*   PHOS  --   --   --   --   --  5.5*   MG 2.20 2.20  --   --   --  1.50*   CL 87* 88*  --   --  82* 90*   CO2 9* 9*  --   --  16* 28   .0* 237.7*  --   --  219.4* 121.2*   CREATININE 16.67* 14.79*  --   --  12.99* 5.78*   EGFRNORACEVR 3 3  --   --  4 10   GLUCOSE 131* 132*  --   --  92 113   BILITOT 0.5  --   --   --  0.4 0.3   ALKPHOS 70  --   --   --  56 50   ALT 10  --   --   --  9 8   AST 8  --   --   --  9 10   ALBUMIN 3.5  --   --   --  2.7* 2.6*   AMMONIA  --   --  27.7  --   --   --    LIPASE 161*  --   --   --   --   --    WBC 9.99 7.09  --   --  6.69 5.03   HGB 11.1* 9.0*  --   --  9.0* 8.8*   HCT 30.7* 24.2*  --   --  25.3* 24.0*     Nutrition Orders:  Diet Renal Non-Dialysis      Appetite/Oral Intake: good/% of meals  Factors Affecting Nutritional Intake: none identified  Food/Mandaeism/Cultural Preferences: unable to obtain  Food Allergies: no known food allergies  Last Bowel Movement: 12/02/24  Wound(s):  skin intact per EMR    Comments    12/2/24: Unable to speak with pt; pt eating 100% documented per EMR and eating well per MD note; noted some weight loss since September per EMR but unable to verify 2/2 unsure if dosing current weight is accurate. Noted pt with diarrhea but does have C.Diff.  "    Anthropometrics    Height: 5' 11" (180.3 cm), Height Method: Stated  Last Weight: 95.3 kg (210 lb 1.6 oz) (bed scale wt on  per RN) (24 1318), Weight Method: Bed Scale  BMI (Calculated): 29.3  BMI Classification: overweight (BMI 25-29.9)        Ideal Body Weight (IBW), Male: 172 lb                       Usual Body Weight (UBW), k kg   (220 lb wt per EMR wt from 2024)  % Usual Body Weight: 95.5  % Weight Change From Usual Weight: -4.7 %  Usual Weight Provided By: EMR weight history    Wt Readings from Last 5 Encounters:   24 95.3 kg (210 lb 1.6 oz)   24 107 kg (235 lb 14.3 oz)     Weight Change(s) Since Admission:   Wt Readings from Last 1 Encounters:   24 1318 95.3 kg (210 lb 1.6 oz)   Admit Weight: 95.3 kg (210 lb 1.6 oz) (bed scale wt on  per RN) (24 1318), Weight Method: Bed Scale    Patient Education     Not applicable.    Nutrition Goals & Monitoring     Dietitian will monitor: energy intake and weight    Nutrition Risk/Follow-Up: low (follow-up in 5-7 days)  Patients assigned 'low nutrition risk' status do not qualify for a full nutritional assessment but will be monitored and re-evaluated in a 5-7 day time period. Please consult if re-evaluation needed sooner.   "

## 2024-12-02 NOTE — PROGRESS NOTES
"12/2/2024  Gabe Barnett   1965   [id number]        Psychiatry Progress Note       SUBJECTIVE:   Gabe Barnett is a 59-year-old [place] [facility] [place] a [place] of [place] abuse, seizures, atrial fibrillation (previously on Eliquis, discontinued), hypertension, and non-ischemic cardiomyopathy (NICMO). He was brought to the ED by EMS after being found in a motel bathtub covered in feces and surrounded by empty alcohol bottles. The patient reports quitting alcohol one month ago but admits to a history of heavy drinking. Upon arrival at the ED, he was tremulous and agitated, which improved with Serax administration. He revealed that he entered the bathtub with the intention of breaking an alcohol bottle to "cut my vein," indicating recent suicidal ideation. He endorses feeling very depressed and has not taken any prescribed medications in several months. The patient also reports a history of vomiting and watery diarrhea over the past several months, though he is unsure if there has been any blood in his emesis or stool. He has been living in a hotel and experiencing significant functional and emotional decline.     Seen at the bedside with 1:1 sitter present. Drowsy but able to participate in interview. Depressed mood and affect. Currently denies suicidal ideations. NO evidence of psychosis. Denies homicidal ideations, auditory/visual hallucinations, or paranoia.       Current Medications:   Scheduled Meds:    chlordiazepoxide  25 mg Oral TID    folic acid  1 mg Oral Daily    heparin (porcine)  5,000 Units Subcutaneous Q12H    midodrine  7.5 mg Oral TID    multivitamin  1 tablet Oral Daily    mupirocin   Nasal BID    piperacillin-tazobactam (Zosyn) IV (PEDS and ADULTS) (extended infusion is not appropriate)  4.5 g Intravenous Q12H    potassium chloride  40 mEq Oral BID    QUEtiapine  50 mg Oral QHS    sertraline  50 mg Oral Daily    thiamine (B-1) 500 mg in D5W 100 mL IVPB  500 mg Intravenous TID    " vancomycin  125 mg Oral Q6H      PRN Meds:   Current Facility-Administered Medications:     acetaminophen, 1,000 mg, Oral, Q6H PRN    aluminum-magnesium hydroxide-simethicone, 30 mL, Oral, QID PRN    bisacodyL, 10 mg, Rectal, Daily PRN    dextrose 10%, 12.5 g, Intravenous, PRN    dextrose 10%, 25 g, Intravenous, PRN    glucagon (human recombinant), 1 mg, Intramuscular, PRN    glucose, 16 g, Oral, PRN    glucose, 24 g, Oral, PRN    heparin, porcine (PF), 5 mL, Intra-Catheter, PRN    melatonin, 6 mg, Oral, Nightly PRN    naloxone, 0.02 mg, Intravenous, PRN    ondansetron, 4 mg, Intravenous, Q4H PRN    prochlorperazine, 5 mg, Intravenous, Q6H PRN    senna-docusate 8.6-50 mg, 1 tablet, Oral, BID PRN    sodium chloride 0.9%, 10 mL, Intravenous, PRN   Psychotherapeutics (From admission, onward)      Start     Stop Route Frequency Ordered    12/02/24 0915  chlordiazepoxide capsule 25 mg         -- Oral 3 times daily 12/02/24 0807    12/01/24 2100  QUEtiapine tablet 50 mg         -- Oral Nightly 12/01/24 1227    12/01/24 1230  sertraline tablet 50 mg         -- Oral Daily 12/01/24 1227            Allergies:   Review of patient's allergies indicates:   Allergen Reactions    Niacin Other (See Comments)     I get flushed and burn up        OBJECTIVE:   Vitals   Vitals:    12/02/24 1114   BP: 119/81   Pulse: 68   Resp: 20   Temp: 97.4 °F (36.3 °C)        Labs/Imaging/Studies:   Recent Results (from the past 36 hours)   POCT glucose    Collection Time: 12/01/24  5:35 AM   Result Value Ref Range    POCT Glucose 86 70 - 110 mg/dL   Comprehensive Metabolic Panel    Collection Time: 12/01/24  7:58 AM   Result Value Ref Range    Sodium 127 (L) 136 - 145 mmol/L    Potassium 3.2 (L) 3.5 - 5.1 mmol/L    Chloride 82 (L) 98 - 107 mmol/L    CO2 16 (L) 23 - 31 mmol/L    Glucose 92 82 - 115 mg/dL    Blood Urea Nitrogen 219.4 (H) 8.4 - 25.7 mg/dL    Creatinine 12.99 (H) 0.72 - 1.25 mg/dL    Calcium 7.6 (L) 8.8 - 10.0 mg/dL    Protein Total  5.4 (L) 5.8 - 7.6 gm/dL    Albumin 2.7 (L) 3.4 - 4.8 g/dL    Globulin 2.7 2.4 - 3.5 gm/dL    Albumin/Globulin Ratio 1.0 (L) 1.1 - 2.0 ratio    Bilirubin Total 0.4 <=1.5 mg/dL    ALP 56 40 - 150 unit/L    ALT 9 0 - 55 unit/L    AST 9 5 - 34 unit/L    eGFR 4 mL/min/1.73/m2    Anion Gap 29.0 mEq/L    BUN/Creatinine Ratio 17    CBC with Differential    Collection Time: 12/01/24  7:58 AM   Result Value Ref Range    WBC 6.69 4.50 - 11.50 x10(3)/mcL    RBC 3.01 (L) 4.70 - 6.10 x10(6)/mcL    Hgb 9.0 (L) 14.0 - 18.0 g/dL    Hct 25.3 (L) 42.0 - 52.0 %    MCV 84.1 80.0 - 94.0 fL    MCH 29.9 27.0 - 31.0 pg    MCHC 35.6 33.0 - 36.0 g/dL    RDW 13.6 11.5 - 17.0 %    Platelet 225 130 - 400 x10(3)/mcL    MPV 10.0 7.4 - 10.4 fL    Neut % 72.8 %    Lymph % 20.3 %    Mono % 4.3 %    Eos % 1.8 %    Basophil % 0.4 %    Lymph # 1.36 0.6 - 4.6 x10(3)/mcL    Neut # 4.86 2.1 - 9.2 x10(3)/mcL    Mono # 0.29 0.1 - 1.3 x10(3)/mcL    Eos # 0.12 0 - 0.9 x10(3)/mcL    Baso # 0.03 <=0.2 x10(3)/mcL    IG# 0.03 0 - 0.04 x10(3)/mcL    IG% 0.4 %    NRBC% 0.0 %   Salicylate Level    Collection Time: 12/01/24  7:58 AM   Result Value Ref Range    Salicylate Level <5.0 (L) 15.0 - 30.0 mg/dL   Acetaminophen Level    Collection Time: 12/01/24  7:58 AM   Result Value Ref Range    Acetaminophen Level <3.0 (L) 10.0 - 30.0 ug/ml   Ethanol    Collection Time: 12/01/24  8:13 AM   Result Value Ref Range    Ethanol Level <10.0 <=10.0 mg/dL   Osmolality, Serum    Collection Time: 12/01/24  8:13 AM   Result Value Ref Range    Osmolality 343 (HH) 280 - 300 mOsm/kg   Hepatitis B Surface Antigen    Collection Time: 12/01/24  8:13 AM   Result Value Ref Range    Hep BsAg Interp Nonreactive Nonreactive   POCT glucose    Collection Time: 12/01/24 12:29 PM   Result Value Ref Range    POCT Glucose 126 (H) 70 - 110 mg/dL   Occult Blood, Stool 2nd Specimen    Collection Time: 12/01/24  5:19 PM   Result Value Ref Range    Stool Color 2 Brown     Stool Consistancy 2 soft      Occult Blood Stool 2 Negative Negative, N/A   POCT glucose    Collection Time: 12/01/24  6:23 PM   Result Value Ref Range    POCT Glucose 124 (H) 70 - 110 mg/dL   Occult Blood, Stool 3rd Specimen    Collection Time: 12/02/24  1:09 AM   Result Value Ref Range    Stool Color 3 Brown     Stool Consistancy 3 liquid     Occult Blood Stool 3 Negative Negative, N/A   POCT glucose    Collection Time: 12/02/24  1:20 AM   Result Value Ref Range    POCT Glucose 110 70 - 110 mg/dL   Phosphorus    Collection Time: 12/02/24  3:38 AM   Result Value Ref Range    Phosphorus Level 5.5 (H) 2.3 - 4.7 mg/dL   Comprehensive Metabolic Panel    Collection Time: 12/02/24  3:38 AM   Result Value Ref Range    Sodium 134 (L) 136 - 145 mmol/L    Potassium 3.2 (L) 3.5 - 5.1 mmol/L    Chloride 90 (L) 98 - 107 mmol/L    CO2 28 23 - 31 mmol/L    Glucose 113 82 - 115 mg/dL    Blood Urea Nitrogen 121.2 (H) 8.4 - 25.7 mg/dL    Creatinine 5.78 (H) 0.72 - 1.25 mg/dL    Calcium 7.3 (L) 8.8 - 10.0 mg/dL    Protein Total 5.3 (L) 5.8 - 7.6 gm/dL    Albumin 2.6 (L) 3.4 - 4.8 g/dL    Globulin 2.7 2.4 - 3.5 gm/dL    Albumin/Globulin Ratio 1.0 (L) 1.1 - 2.0 ratio    Bilirubin Total 0.3 <=1.5 mg/dL    ALP 50 40 - 150 unit/L    ALT 8 0 - 55 unit/L    AST 10 5 - 34 unit/L    eGFR 10 mL/min/1.73/m2    Anion Gap 16.0 mEq/L    BUN/Creatinine Ratio 21    Magnesium    Collection Time: 12/02/24  3:38 AM   Result Value Ref Range    Magnesium Level 1.50 (L) 1.60 - 2.60 mg/dL   CBC with Differential    Collection Time: 12/02/24  3:38 AM   Result Value Ref Range    WBC 5.03 4.50 - 11.50 x10(3)/mcL    RBC 2.92 (L) 4.70 - 6.10 x10(6)/mcL    Hgb 8.8 (L) 14.0 - 18.0 g/dL    Hct 24.0 (L) 42.0 - 52.0 %    MCV 82.2 80.0 - 94.0 fL    MCH 30.1 27.0 - 31.0 pg    MCHC 36.7 (H) 33.0 - 36.0 g/dL    RDW 13.3 11.5 - 17.0 %    Platelet 196 130 - 400 x10(3)/mcL    MPV 9.8 7.4 - 10.4 fL    Neut % 70.7 %    Lymph % 21.5 %    Mono % 4.0 %    Eos % 2.8 %    Basophil % 0.6 %    Lymph # 1.08 0.6  - 4.6 x10(3)/mcL    Neut # 3.56 2.1 - 9.2 x10(3)/mcL    Mono # 0.20 0.1 - 1.3 x10(3)/mcL    Eos # 0.14 0 - 0.9 x10(3)/mcL    Baso # 0.03 <=0.2 x10(3)/mcL    IG# 0.02 0 - 0.04 x10(3)/mcL    IG% 0.4 %    NRBC% 0.0 %   POCT glucose    Collection Time: 12/02/24  6:31 AM   Result Value Ref Range    POCT Glucose 87 70 - 110 mg/dL   POCT glucose    Collection Time: 12/02/24 10:43 AM   Result Value Ref Range    POCT Glucose 121 (H) 70 - 110 mg/dL        Psychiatric Mental Status Exam:  General Appearance: appears stated age, dressed in hospital garb, lying in bed, in no acute distress  Arousal: drowsy  Behavior: cooperative, appropriate eye-contact, under good behavioral control  Movements and Motor Activity: no tics, no tremors, no akathisia, no dystonia, no evidence of tardive dyskinesia  Orientation: oriented to person only  Speech: normal rate, rhythm, volume, tone and pitch  Mood: Depressed  Affect: mood-congruent  Thought Process: linear, goal-directed  Associations: no loosening of associations  Thought Content and Perceptions: no suicidal or homicidal ideation, no auditory or visual hallucinations, no paranoid ideation, no ideas of reference, no evidence of delusions or psychosis  Recent and Remote Memory: grossly intact; per interview/observation with patient  Attention and Concentration: grossly intact; per interview/observation with patient  Fund of Knowledge: grossly intact; based on history, vocabulary, fund of knowledge, syntax, grammar, and content  Insight: questionable; based on understanding of severity of illness and HPI  Judgment: questionable; based on patient's behavior and HPI    ASSESSMENT/PLAN:   Problems Addressed/Diagnoses:  Major Depressive Disorder, Recurrent: 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  Disposition  Recommend inpatient psychiatric hospitalization when medically stable.  Will  continue to follow        Yakov Back

## 2024-12-03 LAB
ALBUMIN SERPL-MCNC: 2.8 G/DL (ref 3.4–4.8)
BASOPHILS # BLD AUTO: 0.03 X10(3)/MCL
BASOPHILS NFR BLD AUTO: 0.8 %
BUN SERPL-MCNC: 76.4 MG/DL (ref 8.4–25.7)
CALCIUM SERPL-MCNC: 7.6 MG/DL (ref 8.8–10)
CHLORIDE SERPL-SCNC: 102 MMOL/L (ref 98–107)
CO2 SERPL-SCNC: 31 MMOL/L (ref 23–31)
CREAT SERPL-MCNC: 2.46 MG/DL (ref 0.72–1.25)
EOSINOPHIL # BLD AUTO: 0.25 X10(3)/MCL (ref 0–0.9)
EOSINOPHIL NFR BLD AUTO: 6.7 %
ERYTHROCYTE [DISTWIDTH] IN BLOOD BY AUTOMATED COUNT: 14.2 % (ref 11.5–17)
GFR SERPLBLD CREATININE-BSD FMLA CKD-EPI: 29 ML/MIN/1.73/M2
GLUCOSE SERPL-MCNC: 119 MG/DL (ref 82–115)
HCT VFR BLD AUTO: 27.8 % (ref 42–52)
HGB BLD-MCNC: 9.3 G/DL (ref 14–18)
IMM GRANULOCYTES # BLD AUTO: 0.02 X10(3)/MCL (ref 0–0.04)
IMM GRANULOCYTES NFR BLD AUTO: 0.5 %
LYMPHOCYTES # BLD AUTO: 1.08 X10(3)/MCL (ref 0.6–4.6)
LYMPHOCYTES NFR BLD AUTO: 28.8 %
MAGNESIUM SERPL-MCNC: 1.1 MG/DL (ref 1.6–2.6)
MCH RBC QN AUTO: 29.6 PG (ref 27–31)
MCHC RBC AUTO-ENTMCNC: 33.5 G/DL (ref 33–36)
MCV RBC AUTO: 88.5 FL (ref 80–94)
MONOCYTES # BLD AUTO: 0.21 X10(3)/MCL (ref 0.1–1.3)
MONOCYTES NFR BLD AUTO: 5.6 %
NEUTROPHILS # BLD AUTO: 2.16 X10(3)/MCL (ref 2.1–9.2)
NEUTROPHILS NFR BLD AUTO: 57.6 %
NRBC BLD AUTO-RTO: 0 %
PHOSPHATE SERPL-MCNC: 2.8 MG/DL (ref 2.3–4.7)
PLATELET # BLD AUTO: 212 X10(3)/MCL (ref 130–400)
PMV BLD AUTO: 10.1 FL (ref 7.4–10.4)
POTASSIUM SERPL-SCNC: 4.4 MMOL/L (ref 3.5–5.1)
RBC # BLD AUTO: 3.14 X10(6)/MCL (ref 4.7–6.1)
SODIUM SERPL-SCNC: 145 MMOL/L (ref 136–145)
WBC # BLD AUTO: 3.75 X10(3)/MCL (ref 4.5–11.5)

## 2024-12-03 PROCEDURE — 85025 COMPLETE CBC W/AUTO DIFF WBC: CPT | Performed by: STUDENT IN AN ORGANIZED HEALTH CARE EDUCATION/TRAINING PROGRAM

## 2024-12-03 PROCEDURE — 36415 COLL VENOUS BLD VENIPUNCTURE: CPT | Performed by: INTERNAL MEDICINE

## 2024-12-03 PROCEDURE — 80069 RENAL FUNCTION PANEL: CPT | Performed by: STUDENT IN AN ORGANIZED HEALTH CARE EDUCATION/TRAINING PROGRAM

## 2024-12-03 PROCEDURE — 63600175 PHARM REV CODE 636 W HCPCS: Performed by: INTERNAL MEDICINE

## 2024-12-03 PROCEDURE — 25000003 PHARM REV CODE 250: Performed by: NURSE PRACTITIONER

## 2024-12-03 PROCEDURE — 63600175 PHARM REV CODE 636 W HCPCS: Performed by: NURSE PRACTITIONER

## 2024-12-03 PROCEDURE — 21400001 HC TELEMETRY ROOM

## 2024-12-03 PROCEDURE — 25000003 PHARM REV CODE 250: Performed by: INTERNAL MEDICINE

## 2024-12-03 PROCEDURE — 83735 ASSAY OF MAGNESIUM: CPT | Performed by: INTERNAL MEDICINE

## 2024-12-03 PROCEDURE — 25000003 PHARM REV CODE 250: Mod: JZ,JG | Performed by: NURSE PRACTITIONER

## 2024-12-03 PROCEDURE — 25000003 PHARM REV CODE 250

## 2024-12-03 PROCEDURE — 27000207 HC ISOLATION

## 2024-12-03 RX ORDER — MAGNESIUM SULFATE HEPTAHYDRATE 40 MG/ML
2 INJECTION, SOLUTION INTRAVENOUS ONCE
Status: COMPLETED | OUTPATIENT
Start: 2024-12-03 | End: 2024-12-03

## 2024-12-03 RX ORDER — MAGNESIUM SULFATE HEPTAHYDRATE 40 MG/ML
4 INJECTION, SOLUTION INTRAVENOUS ONCE
Status: COMPLETED | OUTPATIENT
Start: 2024-12-03 | End: 2024-12-03

## 2024-12-03 RX ORDER — CHLORDIAZEPOXIDE HYDROCHLORIDE 25 MG/1
25 CAPSULE, GELATIN COATED ORAL 2 TIMES DAILY
Status: DISCONTINUED | OUTPATIENT
Start: 2024-12-03 | End: 2024-12-09

## 2024-12-03 RX ORDER — MIDODRINE HYDROCHLORIDE 2.5 MG/1
2.5 TABLET ORAL 3 TIMES DAILY
Status: DISCONTINUED | OUTPATIENT
Start: 2024-12-03 | End: 2024-12-04

## 2024-12-03 RX ORDER — CALCIUM GLUCONATE 20 MG/ML
1 INJECTION, SOLUTION INTRAVENOUS ONCE
Status: COMPLETED | OUTPATIENT
Start: 2024-12-03 | End: 2024-12-03

## 2024-12-03 RX ORDER — CHOLESTYRAMINE 4 G/4.8G
4 POWDER, FOR SUSPENSION ORAL 2 TIMES DAILY
Status: DISPENSED | OUTPATIENT
Start: 2024-12-03 | End: 2024-12-08

## 2024-12-03 RX ORDER — SODIUM CHLORIDE 450 MG/100ML
INJECTION, SOLUTION INTRAVENOUS CONTINUOUS
Status: DISCONTINUED | OUTPATIENT
Start: 2024-12-03 | End: 2024-12-04

## 2024-12-03 RX ORDER — BUTALBITAL, ACETAMINOPHEN AND CAFFEINE 50; 325; 40 MG/1; MG/1; MG/1
1 TABLET ORAL EVERY 4 HOURS PRN
Status: DISCONTINUED | OUTPATIENT
Start: 2024-12-03 | End: 2024-12-10 | Stop reason: HOSPADM

## 2024-12-03 RX ADMIN — MAGNESIUM SULFATE HEPTAHYDRATE 2 G: 40 INJECTION, SOLUTION INTRAVENOUS at 04:12

## 2024-12-03 RX ADMIN — CHOLESTYRAMINE 4 GRAMS OF ANHYDROUS CHOLESTYRAMINE: 4 POWDER, FOR SUSPENSION ORAL at 08:12

## 2024-12-03 RX ADMIN — THIAMINE HCL TAB 100 MG 100 MG: 100 TAB at 08:12

## 2024-12-03 RX ADMIN — SERTRALINE HYDROCHLORIDE 50 MG: 50 TABLET ORAL at 08:12

## 2024-12-03 RX ADMIN — ACETAMINOPHEN 1000 MG: 500 TABLET ORAL at 05:12

## 2024-12-03 RX ADMIN — FOLIC ACID 1 MG: 1 TABLET ORAL at 08:12

## 2024-12-03 RX ADMIN — MIDODRINE HYDROCHLORIDE 7.5 MG: 5 TABLET ORAL at 08:12

## 2024-12-03 RX ADMIN — CHOLESTYRAMINE 4 GRAMS OF ANHYDROUS CHOLESTYRAMINE: 4 POWDER, FOR SUSPENSION ORAL at 11:12

## 2024-12-03 RX ADMIN — HEPARIN SODIUM 5000 UNITS: 5000 INJECTION, SOLUTION INTRAVENOUS; SUBCUTANEOUS at 08:12

## 2024-12-03 RX ADMIN — PIPERACILLIN SODIUM AND TAZOBACTAM SODIUM 4.5 G: 4; .5 INJECTION, POWDER, LYOPHILIZED, FOR SOLUTION INTRAVENOUS at 08:12

## 2024-12-03 RX ADMIN — MAGNESIUM SULFATE HEPTAHYDRATE 4 G: 40 INJECTION, SOLUTION INTRAVENOUS at 07:12

## 2024-12-03 RX ADMIN — CETIRIZINE HYDROCHLORIDE 10 MG: 10 TABLET, FILM COATED ORAL at 08:12

## 2024-12-03 RX ADMIN — VANCOMYCIN HYDROCHLORIDE 125 MG: 125 CAPSULE ORAL at 05:12

## 2024-12-03 RX ADMIN — CHLORDIAZEPOXIDE HYDROCHLORIDE 25 MG: 25 CAPSULE ORAL at 08:12

## 2024-12-03 RX ADMIN — FLUTICASONE PROPIONATE 100 MCG: 50 SPRAY, METERED NASAL at 08:12

## 2024-12-03 RX ADMIN — SODIUM CHLORIDE: 4.5 INJECTION, SOLUTION INTRAVENOUS at 04:12

## 2024-12-03 RX ADMIN — VANCOMYCIN HYDROCHLORIDE 125 MG: 125 CAPSULE ORAL at 11:12

## 2024-12-03 RX ADMIN — VANCOMYCIN HYDROCHLORIDE 125 MG: 125 CAPSULE ORAL at 06:12

## 2024-12-03 RX ADMIN — MUPIROCIN: 20 OINTMENT TOPICAL at 09:12

## 2024-12-03 RX ADMIN — PIPERACILLIN SODIUM AND TAZOBACTAM SODIUM 4.5 G: 4; .5 INJECTION, POWDER, LYOPHILIZED, FOR SOLUTION INTRAVENOUS at 11:12

## 2024-12-03 RX ADMIN — PSEUDOEPHEDRINE HCL 60 MG: 30 TABLET, FILM COATED ORAL at 08:12

## 2024-12-03 RX ADMIN — CALCIUM GLUCONATE 1 G: 20 INJECTION, SOLUTION INTRAVENOUS at 07:12

## 2024-12-03 RX ADMIN — PSEUDOEPHEDRINE HCL 60 MG: 30 TABLET, FILM COATED ORAL at 09:12

## 2024-12-03 RX ADMIN — MIDODRINE HYDROCHLORIDE 2.5 MG: 2.5 TABLET ORAL at 09:12

## 2024-12-03 RX ADMIN — QUETIAPINE FUMARATE 50 MG: 25 TABLET ORAL at 08:12

## 2024-12-03 RX ADMIN — BUTALBITAL, ACETAMINOPHEN, AND CAFFEINE 1 TABLET: 325; 50; 40 TABLET ORAL at 07:12

## 2024-12-03 RX ADMIN — THERA TABS 1 TABLET: TAB at 08:12

## 2024-12-03 RX ADMIN — MUPIROCIN: 20 OINTMENT TOPICAL at 08:12

## 2024-12-03 NOTE — PLAN OF CARE
Problem: Adult Inpatient Plan of Care  Goal: Plan of Care Review  Outcome: Progressing  Goal: Patient-Specific Goal (Individualized)  Outcome: Progressing  Goal: Absence of Hospital-Acquired Illness or Injury  Outcome: Progressing  Goal: Optimal Comfort and Wellbeing  Outcome: Progressing  Goal: Readiness for Transition of Care  Outcome: Progressing     Problem: Nausea and Vomiting  Goal: Nausea and Vomiting Relief  Outcome: Progressing     Problem: Suicidal Behavior  Goal: Suicidal Behavior is Absent or Managed  Outcome: Progressing     Problem: Suicide Risk  Goal: Absence of Self-Harm  Outcome: Progressing     Problem: Infection  Goal: Absence of Infection Signs and Symptoms  Outcome: Progressing     Problem: Hemodialysis  Goal: Safe, Effective Therapy Delivery  Outcome: Progressing  Goal: Effective Tissue Perfusion  Outcome: Progressing  Goal: Absence of Infection Signs and Symptoms  Outcome: Progressing     Problem: Diarrhea  Goal: Effective Diarrhea Management  Outcome: Progressing

## 2024-12-03 NOTE — PROGRESS NOTES
Nephrology consult follow up note    HPI:      Gabe Barnett is a 59 y.o. male who presents after being found altered. Past medical history significant for AFib, hypertension, and nonischemic cardiomyopathy. Patient was found in a motel tub coverage and feces with empty alcohol bottles. Supposedly he had a planned to break 1 of the bottles and tried to kill himself by slitting his wrists. He denies any toxic alcohol ingestions. States that he feels depressed. He does endorse occasional nausea, vomiting, and diarrhea. At admission he was found to have severe acute kidney injury with BUN of 247, and creatinine of 16. Nephrology consulted for acute kidney injury. He denies chest pain, shortness of breath, or lower extremity edema. Patient is overall a very poor historian.  Patient was urgently dialyzed after admission due to severe azotemia.    Interval history:     12/02/2024  No acute events overnight.  Patient more interactive today.  Dialyzed yesterday.  Making good urine output, 3.8 L yesterday.    12/03/2024  Patient making excellent urine output, 7.4 L yesterday.  He estimates that he drank about 3-4 L of water yesterday.  No chest pain, shortness of breath, abdominal pain, nausea, vomiting, or lower extremity edema.     Review of Systems:     Comprehensive 10pt ROS negative except as noted per history.    Past medical, family, surgical, and social history reviewed and unchanged from initial consult note.     Objective:       VITAL SIGNS: 24 HR MIN & MAX LAST    Temp  Min: 97.7 °F (36.5 °C)  Max: 98.1 °F (36.7 °C)  98.1 °F (36.7 °C)        BP  Min: 96/60  Max: 124/78  124/78     Pulse  Min: 67  Max: 84  83     Resp  Min: 18  Max: 18  18    SpO2  Min: 95 %  Max: 96 %  95 %      GEN:  Ill-appearing WM  HEENT: Conjunctiva anicteric, pupils equal   CV: RRR +S1,S2 without murmur  PULM: CTAB, unlabored  ABD: Soft, NT/ND abdomen with NABS  EXT: No cyanosis or edema  SKIN: Warm and dry  PSYCH: Awake, alert and  conversant.  Dialysis access:  Right IJ Vas-Cath            Component Value Date/Time     12/03/2024 0329     (L) 12/02/2024 0338    K 4.4 12/03/2024 0329    K 3.2 (L) 12/02/2024 0338    CO2 31 12/03/2024 0329    CO2 28 12/02/2024 0338    BUN 76.4 (H) 12/03/2024 0329    .2 (H) 12/02/2024 0338    CREATININE 2.46 (H) 12/03/2024 0329    CREATININE 5.78 (H) 12/02/2024 0338    CALCIUM 7.6 (L) 12/03/2024 0329    CALCIUM 7.3 (L) 12/02/2024 0338    PHOS 2.8 12/03/2024 0329            Component Value Date/Time    WBC 3.75 (L) 12/03/2024 0329    WBC 5.03 12/02/2024 0338    HGB 9.3 (L) 12/03/2024 0329    HGB 8.8 (L) 12/02/2024 0338    HCT 27.8 (L) 12/03/2024 0329    HCT 24.0 (L) 12/02/2024 0338     12/03/2024 0329     12/02/2024 0338         Imaging reviewed      Assessment / Plan:       There are no hospital problems to display for this patient.      Acute kidney injury - unknown baseline renal function  Severe azotemia  Hyperkalemia  Severe anion gap metabolic acidosis  Suicidal ideation     Plan:  Renal function improving without dialysis.  Patient also making excellent urine output.  It was not seem like he was drinking enough water to keep up with his high urine output currently.  High urine output likely due to post ATN diuresis.  Continue IV fluids but decrease rate of normal saline to 100 mL/hr.  Encourage p.o. water intake.  Repeat labs in a.m..  We will follow.    Leno Paredes DO  Nephrology  [facility]  Clinic number: [phone]      Note was created with the assistance of electronic Dictation Services.  Note was reviewed to decrease errors, however, these may still be present.  Please contact me about questions or concerns with the dictation.

## 2024-12-03 NOTE — PROGRESS NOTES
"[facility] Hospital Medicine Progress Note        Chief Complaint: Inpatient Follow-up     HPI:   59 yr old male with PMHx of Alcohol abuse, Seizures, atrial fibrillation (previously on Eliquis but quit taking), HTN, and NICMO was brought to the ED by  EMS after he was found in a Motel in tub covered in feces surrounded by empty alcohol bottles. Patient reports he quit drinking one month ago. In the ED he was tremulous and agitated on arrival that  had  improved with Serax. Has been living in a hotel. Reports he got in the tub with intention of breaking one of the alcohol bottles and "cutting my vein". Admits to feeling very depressed. Has not taken any of his medications in several months. Reports several month history of vomiting and watery diarrhea. Unsure if he's noticed any blood in emesis or stool.      VS on arrival: T 95.7, P 98, R 18, BP 96/46, Sats 98% on room air. Initial labs: WBC 9.99, Hgb 11.1, Plt 246, Na 133, K 5.2, Cl 87, bicarb 9, , creatinine 16.67, glucose 131, AST 8, ALT 10, lipase 161. Lactic acid 2.2, CPK, ETOH, TSH, ammonia, Troponin normal.  CT abdomen and pelvis shows patchy ground-glass in the lung bases suggestive of infectious or inflammatory process.  No acute process identified in the abdomen or pelvis.  Retroperitoneal ultrasound unremarkable. Pt was admitted to  service . Nephrology was consulted to assist. Pt was placed under PEC due to suicidal ideation and attempt. Psych consulted.      Pt was started on Calcium gluconate, D10 with IV insulin and Lokelma per hyperkalemia protocol. Aggressive IV hydration with bicarb drip, high dose thiamine, Librium taper, folic acid and multivitamin. Blood cultures were drawn and Zosyn initiated for presumed aspiration pneumonia. Stool was collected for C.diff toxins and came back positive. Initiated on oral Vancomycin. Pt remained with profound metabolic acidosis with large AG and azotemia. Nephrology " consulted General Surgery for temporary HD catheter and Hemodialysis initiated. Pt underwent single session dialysis on 12/1/24. UOP increased.     Interval Hx:   Pt is much more awake and interactive today.  C/O sinus congestion and sinus pressure. Asking for meds to help symptoms   Stool is still loose but less frequent   Afebrile. BP is still soft. Increase Midodrine to 7.5 mg tid. IVF adjusted per Nephro     Labs today WBC 5.0, Hgb 8.8, Na 134, K 3.2- replaced, Mg 1.5- replaced orally , BUN down to 121 , Cr 5.7 with HD.  No HD today per Nephro   Psych revisited Pt today. Recommended continued PEC and inpatient psychiatric hospitalization when medically stable.     Case was discussed with patient's nurse and  on the floor.    Objective/physical exam:  General: In no acute distress, afebrile, on RA  Chest: Clear to auscultation bilaterally  Heart: RRR, +S1, S2, no appreciable murmur  Abdomen: Soft, nontender, BS +  MSK: Warm, no lower extremity edema, no clubbing or cyanosis  Neurologic: Alert and oriented    VITAL SIGNS: 24 HRS MIN & MAX LAST   Temp  Min: 97.2 °F (36.2 °C)  Max: 98.1 °F (36.7 °C) 97.8 °F (36.6 °C)   BP  Min: 83/48  Max: 119/81 96/60   Pulse  Min: 67  Max: 80  67   Resp  Min: 16  Max: 20 18   SpO2  Min: 95 %  Max: 98 % 95 %     I have reviewed the following labs:  Recent Labs   Lab 11/30/24 2007 12/01/24 0758 12/02/24 0338   WBC 7.09 6.69 5.03   RBC 2.98* 3.01* 2.92*   HGB 9.0* 9.0* 8.8*   HCT 24.2* 25.3* 24.0*   MCV 81.2 84.1 82.2   MCH 30.2 29.9 30.1   MCHC 37.2* 35.6 36.7*   RDW 13.5 13.6 13.3    225 196   MPV 9.6 10.0 9.8     Recent Labs   Lab 11/30/24  1359 11/30/24 2007 11/30/24  2352 12/01/24 0758 12/02/24 0338   * 130*  --  127* 134*   K 5.2* 6.2* 3.5 3.2* 3.2*   CL 87* 88*  --  82* 90*   CO2 9* 9*  --  16* 28   .0* 237.7*  --  219.4* 121.2*   CREATININE 16.67* 14.79*  --  12.99* 5.78*   CALCIUM 8.1* 8.4*  --  7.6* 7.3*   MG 2.20 2.20  --   --  1.50*    ALBUMIN 3.5  --   --  2.7* 2.6*   ALKPHOS 70  --   --  56 50   ALT 10  --   --  9 8   AST 8  --   --  9 10   BILITOT 0.5  --   --  0.4 0.3     Microbiology Results (last 7 days)       Procedure Component Value Units Date/Time    Blood Culture [[id number]]  (Normal) Collected: 11/30/24 2007    Order Status: Completed Specimen: Blood, Venous Updated: 12/01/24 2102     Blood Culture No Growth At 24 Hours    Blood Culture [[id number]]  (Normal) Collected: 11/30/24 2007    Order Status: Completed Specimen: Blood, Venous Updated: 12/01/24 2102     Blood Culture No Growth At 24 Hours    Clostridium Diff Toxin, A & B, EIA [[id number]]  (Abnormal) Collected: 11/30/24 2331    Order Status: Completed Specimen: Stool Updated: 12/01/24 0840     Clostridium Difficile GDH Antigen Positive     Clostridium Difficile Toxin A/B Positive             See below for Radiology    Assessment/Plan:  Acute kidney injury with profound azotemia, POA  Hyperkalemia due to NOE, POA  Large AG profound metabolic acidosis, POA  Reported 2 mos H/O vomiting and diarrhea   C.diff infection and diarrhea, POA  Upper respiratory symptoms with sinus congestion and sinus pressure, POA  Suspected Aspiration Pneumonia, POA  Hyponatremia , POA  Hypotension due to severe volume depletion, POA  Alcohol abuse and dependency   Substance abuse mood disorder   Major depression and Suicidal ideation, POA  PEC status , POA        Hx- PAF, HTN, NICMO     Plan-  S/P single session hemodialysis yesterday 12/1/24 to correct profound azotemia and severe AG metabolic acidosis.   Fluid management per Nephrology   Librium taper and CIWA scale for alcohol withdrawal   Parenteral Thiamine 500 mg IV tid x 48h then transitioned to oral   Continue other supportive treatment   Course of Zosyn for presumed aspiration pneumonitis   Antihistamines and decongestant for sinus symptoms   Oral vancomycin initiated for C.diff infection and diarrhea   PEC continued   Psych initiated  Sertraline and Seroquel   Suggested inpatient psychiatric hospitalization when medically stable.   Monitor course        VTE prophylaxis: Heparin      Patient condition:  Fair     Anticipated discharge and Disposition:     TBD      All diagnosis and differential diagnosis have been reviewed; assessment and plan has been documented; I have personally reviewed the labs and test results that are presently available; I have reviewed the patients medication list; I have reviewed the consulting providers response and recommendations. I have reviewed or attempted to review medical records based upon their availability    All of the patient's questions have been  addressed and answered. Patient's is agreeable to the above stated plan. I will continue to monitor closely and make adjustments to medical management as needed.    Portions of this note dictated using EMR integrated voice recognition software, and may be subject to voice recognition errors not corrected at proofreading. Please contact writer for clarification if needed.   _____________________________________________________________________    Malnutrition Status:  Nutrition consulted. Most recent weight and BMI monitored-     Measurements:  Wt Readings from Last 1 Encounters:   12/02/24 95.3 kg (210 lb 1.6 oz)   Body mass index is 29.3 kg/m².    Patient has been screened and assessed by RD.    Malnutrition Type:  Context:    Level:      Malnutrition Characteristic Summary:       Interventions/Recommendations (treatment strategy):        Scheduled Med:   cetirizine  10 mg Oral Daily    chlordiazepoxide  25 mg Oral TID    fluticasone propionate  2 spray Each Nostril BID    folic acid  1 mg Oral Daily    heparin (porcine)  5,000 Units Subcutaneous Q12H    midodrine  7.5 mg Oral TID    multivitamin  1 tablet Oral Daily    mupirocin   Nasal BID    piperacillin-tazobactam (Zosyn) IV (PEDS and ADULTS) (extended infusion is not appropriate)  4.5 g Intravenous Q12H     potassium chloride  40 mEq Oral BID    pseudoephedrine  60 mg Oral BID    QUEtiapine  50 mg Oral QHS    sertraline  50 mg Oral Daily    thiamine (B-1) 500 mg in D5W 100 mL IVPB  500 mg Intravenous TID    vancomycin  125 mg Oral Q6H      Continuous Infusions:   0.9% NaCl   Intravenous Continuous 125 mL/hr at 12/02/24 1543 New Bag at 12/02/24 1543      PRN Meds:    Current Facility-Administered Medications:     acetaminophen, 1,000 mg, Oral, Q6H PRN    aluminum-magnesium hydroxide-simethicone, 30 mL, Oral, QID PRN    bisacodyL, 10 mg, Rectal, Daily PRN    dextrose 10%, 12.5 g, Intravenous, PRN    dextrose 10%, 25 g, Intravenous, PRN    glucagon (human recombinant), 1 mg, Intramuscular, PRN    glucose, 16 g, Oral, PRN    glucose, 24 g, Oral, PRN    heparin, porcine (PF), 5 mL, Intra-Catheter, PRN    melatonin, 6 mg, Oral, Nightly PRN    naloxone, 0.02 mg, Intravenous, PRN    ondansetron, 4 mg, Intravenous, Q4H PRN    prochlorperazine, 5 mg, Intravenous, Q6H PRN    senna-docusate 8.6-50 mg, 1 tablet, Oral, BID PRN    sodium chloride 0.9%, 10 mL, Intravenous, PRN         Albert Toledo MD  Department of Hospital Medicine   [facility]   12/02/2024

## 2024-12-03 NOTE — PROGRESS NOTES
"12/3/2024  Gabe Barnett   1965   [id number]        Psychiatry Progress Note       SUBJECTIVE:   Gabe Barnett is a 59-year-old male admitted under PEC at [facility] [place] with a history of alcohol abuse, seizures, atrial fibrillation (previously on Eliquis, discontinued), hypertension, and non-ischemic cardiomyopathy (NICMO). He was brought to the ED by EMS after being found in a motel bathtub covered in feces and surrounded by empty alcohol bottles. The patient reports quitting alcohol one month ago but admits to a history of heavy drinking. Upon arrival at the ED, he was tremulous and agitated, which improved with Serax administration. He revealed that he entered the bathtub with the intention of breaking an alcohol bottle to "cut my vein," indicating recent suicidal ideation. He endorses feeling very depressed and has not taken any prescribed medications in several months. The patient also reports a history of vomiting and watery diarrhea over the past several months, though he is unsure if there has been any blood in his emesis or stool. He has been living in a hotel and experiencing significant functional and emotional decline.     Seen at the bedside where he is drowsy but cooperative with interview. Displays poor eye-contact, dysthymic affect, and reports a depressed mood. Reports no issues with sleep overnight and also denies issues with appetite. Displays no evidence of psychosis. Goal-directed thought process without delusional ideations. Denies suicidal ideations, homicidal ideations, or auditory/visual hallucinations. Reports tolerating medications without adverse effects. Denies headache, nausea/vomiting, or auditory/visual/tactile disturbance. No evidence of tremor or sweating at this time.        Current Medications:   Scheduled Meds:    cetirizine  10 mg Oral Daily    chlordiazepoxide  25 mg Oral BID    cholestyramine-aspartame  4 grams of anhydrous cholestyramine Oral BID    fluticasone propionate  2 spray Each " Nostril BID    folic acid  1 mg Oral Daily    heparin (porcine)  5,000 Units Subcutaneous Q12H    midodrine  7.5 mg Oral TID    multivitamin  1 tablet Oral Daily    mupirocin   Nasal BID    piperacillin-tazobactam (Zosyn) IV (PEDS and ADULTS) (extended infusion is not appropriate)  4.5 g Intravenous Q12H    pseudoephedrine  60 mg Oral BID    QUEtiapine  50 mg Oral QHS    sertraline  50 mg Oral Daily    thiamine  100 mg Oral BID    vancomycin  125 mg Oral Q6H      PRN Meds:   Current Facility-Administered Medications:     acetaminophen, 1,000 mg, Oral, Q6H PRN    aluminum-magnesium hydroxide-simethicone, 30 mL, Oral, QID PRN    bisacodyL, 10 mg, Rectal, Daily PRN    dextrose 10%, 12.5 g, Intravenous, PRN    dextrose 10%, 25 g, Intravenous, PRN    glucagon (human recombinant), 1 mg, Intramuscular, PRN    glucose, 16 g, Oral, PRN    glucose, 24 g, Oral, PRN    heparin, porcine (PF), 5 mL, Intra-Catheter, PRN    melatonin, 6 mg, Oral, Nightly PRN    naloxone, 0.02 mg, Intravenous, PRN    ondansetron, 4 mg, Intravenous, Q4H PRN    prochlorperazine, 5 mg, Intravenous, Q6H PRN    senna-docusate 8.6-50 mg, 1 tablet, Oral, BID PRN    sodium chloride 0.9%, 10 mL, Intravenous, PRN   Psychotherapeutics (From admission, onward)      Start     Stop Route Frequency Ordered    12/03/24 2100  chlordiazepoxide capsule 25 mg         -- Oral 2 times daily 12/03/24 0956    12/01/24 2100  QUEtiapine tablet 50 mg         -- Oral Nightly 12/01/24 1227    12/01/24 1230  sertraline tablet 50 mg         -- Oral Daily 12/01/24 1227            Allergies:   Review of patient's allergies indicates:   Allergen Reactions    Niacin Other (See Comments)     I get flushed and burn up        OBJECTIVE:   Vitals   Vitals:    12/03/24 0746   BP: 124/83   Pulse: 84   Resp:    Temp: 97.7 °F (36.5 °C)        Labs/Imaging/Studies:   Recent Results (from the past 36 hours)   Occult Blood, Stool 3rd Specimen    Collection Time: 12/02/24  1:09 AM   Result Value  Ref Range    Stool Color 3 Brown     Stool Consistancy 3 liquid     Occult Blood Stool 3 Negative Negative, N/A   POCT glucose    Collection Time: 12/02/24  1:20 AM   Result Value Ref Range    POCT Glucose 110 70 - 110 mg/dL   Phosphorus    Collection Time: 12/02/24  3:38 AM   Result Value Ref Range    Phosphorus Level 5.5 (H) 2.3 - 4.7 mg/dL   Comprehensive Metabolic Panel    Collection Time: 12/02/24  3:38 AM   Result Value Ref Range    Sodium 134 (L) 136 - 145 mmol/L    Potassium 3.2 (L) 3.5 - 5.1 mmol/L    Chloride 90 (L) 98 - 107 mmol/L    CO2 28 23 - 31 mmol/L    Glucose 113 82 - 115 mg/dL    Blood Urea Nitrogen 121.2 (H) 8.4 - 25.7 mg/dL    Creatinine 5.78 (H) 0.72 - 1.25 mg/dL    Calcium 7.3 (L) 8.8 - 10.0 mg/dL    Protein Total 5.3 (L) 5.8 - 7.6 gm/dL    Albumin 2.6 (L) 3.4 - 4.8 g/dL    Globulin 2.7 2.4 - 3.5 gm/dL    Albumin/Globulin Ratio 1.0 (L) 1.1 - 2.0 ratio    Bilirubin Total 0.3 <=1.5 mg/dL    ALP 50 40 - 150 unit/L    ALT 8 0 - 55 unit/L    AST 10 5 - 34 unit/L    eGFR 10 mL/min/1.73/m2    Anion Gap 16.0 mEq/L    BUN/Creatinine Ratio 21    Magnesium    Collection Time: 12/02/24  3:38 AM   Result Value Ref Range    Magnesium Level 1.50 (L) 1.60 - 2.60 mg/dL   CBC with Differential    Collection Time: 12/02/24  3:38 AM   Result Value Ref Range    WBC 5.03 4.50 - 11.50 x10(3)/mcL    RBC 2.92 (L) 4.70 - 6.10 x10(6)/mcL    Hgb 8.8 (L) 14.0 - 18.0 g/dL    Hct 24.0 (L) 42.0 - 52.0 %    MCV 82.2 80.0 - 94.0 fL    MCH 30.1 27.0 - 31.0 pg    MCHC 36.7 (H) 33.0 - 36.0 g/dL    RDW 13.3 11.5 - 17.0 %    Platelet 196 130 - 400 x10(3)/mcL    MPV 9.8 7.4 - 10.4 fL    Neut % 70.7 %    Lymph % 21.5 %    Mono % 4.0 %    Eos % 2.8 %    Basophil % 0.6 %    Lymph # 1.08 0.6 - 4.6 x10(3)/mcL    Neut # 3.56 2.1 - 9.2 x10(3)/mcL    Mono # 0.20 0.1 - 1.3 x10(3)/mcL    Eos # 0.14 0 - 0.9 x10(3)/mcL    Baso # 0.03 <=0.2 x10(3)/mcL    IG# 0.02 0 - 0.04 x10(3)/mcL    IG% 0.4 %    NRBC% 0.0 %   POCT glucose    Collection Time:  12/02/24  6:31 AM   Result Value Ref Range    POCT Glucose 87 70 - 110 mg/dL   POCT glucose    Collection Time: 12/02/24 10:43 AM   Result Value Ref Range    POCT Glucose 121 (H) 70 - 110 mg/dL   POCT glucose    Collection Time: 12/02/24  3:26 PM   Result Value Ref Range    POCT Glucose 110 70 - 110 mg/dL   POCT glucose    Collection Time: 12/02/24  8:44 PM   Result Value Ref Range    POCT Glucose 122 (H) 70 - 110 mg/dL   Renal Function Panel    Collection Time: 12/03/24  3:29 AM   Result Value Ref Range    Sodium 145 136 - 145 mmol/L    Potassium 4.4 3.5 - 5.1 mmol/L    Chloride 102 98 - 107 mmol/L    CO2 31 23 - 31 mmol/L    Glucose 119 (H) 82 - 115 mg/dL    Blood Urea Nitrogen 76.4 (H) 8.4 - 25.7 mg/dL    Creatinine 2.46 (H) 0.72 - 1.25 mg/dL    Calcium 7.6 (L) 8.8 - 10.0 mg/dL    Albumin 2.8 (L) 3.4 - 4.8 g/dL    Phosphorus Level 2.8 2.3 - 4.7 mg/dL    eGFR 29 mL/min/1.73/m2   Magnesium    Collection Time: 12/03/24  3:29 AM   Result Value Ref Range    Magnesium Level 1.10 (L) 1.60 - 2.60 mg/dL   CBC with Differential    Collection Time: 12/03/24  3:29 AM   Result Value Ref Range    WBC 3.75 (L) 4.50 - 11.50 x10(3)/mcL    RBC 3.14 (L) 4.70 - 6.10 x10(6)/mcL    Hgb 9.3 (L) 14.0 - 18.0 g/dL    Hct 27.8 (L) 42.0 - 52.0 %    MCV 88.5 80.0 - 94.0 fL    MCH 29.6 27.0 - 31.0 pg    MCHC 33.5 33.0 - 36.0 g/dL    RDW 14.2 11.5 - 17.0 %    Platelet 212 130 - 400 x10(3)/mcL    MPV 10.1 7.4 - 10.4 fL    Neut % 57.6 %    Lymph % 28.8 %    Mono % 5.6 %    Eos % 6.7 %    Basophil % 0.8 %    Lymph # 1.08 0.6 - 4.6 x10(3)/mcL    Neut # 2.16 2.1 - 9.2 x10(3)/mcL    Mono # 0.21 0.1 - 1.3 x10(3)/mcL    Eos # 0.25 0 - 0.9 x10(3)/mcL    Baso # 0.03 <=0.2 x10(3)/mcL    IG# 0.02 0 - 0.04 x10(3)/mcL    IG% 0.5 %    NRBC% 0.0 %        Psychiatric Mental Status Exam:  General Appearance: appears stated age, dressed in hospital garb, lying in bed, in no acute distress  Arousal: drowsy  Behavior: cooperative, polite, poor eye  contact  Movements and Motor Activity: no abnormal involuntary movements noted  Orientation: oriented to person, place, and time  Speech: spontaneous, coherent  Mood: Depressed  Affect: mood-congruent, dysthymic  Thought Process: goal-directed  Associations: no loosening of associations  Thought Content and Perceptions: no suicidal or homicidal ideation, no auditory or visual hallucinations, no paranoid ideation, no ideas of reference, no evidence of delusions or psychosis  Recent and Remote Memory: grossly intact; per interview/observation with patient  Attention and Concentration: grossly intact; per interview/observation with patient  Fund of Knowledge: grossly intact; based on history, vocabulary, fund of knowledge, syntax, grammar, and content  Insight: questionable; based on understanding of severity of illness and HPI  Judgment: questionable; based on patient's behavior and HPI    ASSESSMENT/PLAN:   Problems Addressed/Diagnoses:   Major Depressive Disorder, Recurrent: 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  Disposition  Recommend inpatient psychiatric hospitalization when medically stable.  Will continue to follow           Yakov Back

## 2024-12-03 NOTE — PROGRESS NOTES
"[facility] Hospital Medicine Progress Note        Chief Complaint: Inpatient Follow-up     HPI:   59 yr old male with PMHx of Alcohol abuse, Seizures( I would assume it was alcohol withdrawal seizure), atrial fibrillation (previously on Eliquis but quit taking), HTN, and NICMO now recovered with LVEF 65-70% on echo 11/30/24 was brought to the ED by  EMS after he was found in a Motel in tub covered in feces surrounded by empty alcohol bottles. Patient reports he quit drinking one month ago. In the ED he was tremulous and agitated on arrival that had  improved with Serax. Has been living in a motel. Reports he got in the tub with intention of breaking one of the alcohol bottles and "cutting my vein". Admits to feeling very depressed. Has not taken any of his medications in several months. Reports several month history of vomiting and watery diarrhea. Unsure if he's noticed any blood in emesis or stool.      VS on arrival: T 95.7, P 98, R 18, BP 96/46, Sats 98% on room air. Initial labs: WBC 9.99, Hgb 11.1, Plt 246, Na 133, K 5.2, Cl 87, bicarb 9, , creatinine 16.67, glucose 131, AST 8, ALT 10, lipase 161. Lactic acid 2.2, CPK, ETOH, TSH, ammonia, Troponin normal.  CT abdomen and pelvis shows patchy ground-glass in the lung bases suggestive of infectious or inflammatory process.  No acute process identified in the abdomen or pelvis. Retroperitoneal ultrasound unremarkable. Pt was admitted to  service . Nephrology was consulted to assist. Pt was placed under PEC due to suicidal ideation and attempt. Psych consulted.      Pt was started on Calcium gluconate, D10 with IV insulin and Lokelma per hyperkalemia protocol. Aggressive IV hydration with bicarb drip, high dose thiamine, Librium taper, folic acid and multivitamin. Blood cultures were drawn and Zosyn initiated for presumed aspiration pneumonia. Stool was collected for C.diff toxins and came back positive. Initiated on oral " Vancomycin. Pt remained with profound metabolic acidosis with large AG and azotemia. Nephrology consulted General Surgery for temporary HD catheter and Hemodialysis initiated. Pt underwent single session dialysis on 12/1/24. UOP increased. No further dialysis needed. Renal function cont to recover.  Pt became mow awake, alert and interactive. Diarrhea persisted. Questran added to mitigate stool frequency.     Interval Hx:   Pt is more interactive . Reports 5 loose stools yesterday.  Afebrile. BP is rising. Will wean Midodrine, on RA  Labs - WBC 3.7, Hgb 9.3, BUN 76, Cr 2.4, BUN down to 76. Mg 1.1.   Wade removed today     Case was discussed with patient's nurse and  on the floor.    Objective/physical exam:  General: In no acute distress, afebrile, on RA  Chest: Clear to auscultation bilaterally  Heart: RRR, +S1, S2, no appreciable murmur  Abdomen: Soft, nontender, BS +  MSK: Warm, no lower extremity edema, no clubbing or cyanosis  Neurologic: Alert and oriented       VITAL SIGNS: 24 HRS MIN & MAX LAST   Temp  Min: 97.5 °F (36.4 °C)  Max: 98.1 °F (36.7 °C) 97.5 °F (36.4 °C)   BP  Min: 102/65  Max: 142/88 (!) 142/88   Pulse  Min: 67  Max: 84  76   No data recorded 18   SpO2  Min: 95 %  Max: 96 % 96 %     I have reviewed the following labs:  Recent Labs   Lab 12/01/24 0758 12/02/24 0338 12/03/24 0329   WBC 6.69 5.03 3.75*   RBC 3.01* 2.92* 3.14*   HGB 9.0* 8.8* 9.3*   HCT 25.3* 24.0* 27.8*   MCV 84.1 82.2 88.5   MCH 29.9 30.1 29.6   MCHC 35.6 36.7* 33.5   RDW 13.6 13.3 14.2    196 212   MPV 10.0 9.8 10.1     Recent Labs   Lab 11/30/24  1359 11/30/24 2007 11/30/24  2352 12/01/24 0758 12/02/24 0338 12/03/24  0329   * 130*  --  127* 134* 145   K 5.2* 6.2*   < > 3.2* 3.2* 4.4   CL 87* 88*  --  82* 90* 102   CO2 9* 9*  --  16* 28 31   .0* 237.7*  --  219.4* 121.2* 76.4*   CREATININE 16.67* 14.79*  --  12.99* 5.78* 2.46*   CALCIUM 8.1* 8.4*  --  7.6* 7.3* 7.6*   MG 2.20 2.20  --   --   1.50* 1.10*   ALBUMIN 3.5  --   --  2.7* 2.6* 2.8*   ALKPHOS 70  --   --  56 50  --    ALT 10  --   --  9 8  --    AST 8  --   --  9 10  --    BILITOT 0.5  --   --  0.4 0.3  --     < > = values in this interval not displayed.     Microbiology Results (last 7 days)       Procedure Component Value Units Date/Time    Blood Culture [[id number]]  (Normal) Collected: 11/30/24 2007    Order Status: Completed Specimen: Blood, Venous Updated: 12/02/24 2100     Blood Culture No Growth At 48 Hours    Blood Culture [[id number]]  (Normal) Collected: 11/30/24 2007    Order Status: Completed Specimen: Blood, Venous Updated: 12/02/24 2100     Blood Culture No Growth At 48 Hours    Clostridium Diff Toxin, A & B, EIA [[id number]]  (Abnormal) Collected: 11/30/24 2331    Order Status: Completed Specimen: Stool Updated: 12/01/24 0840     Clostridium Difficile GDH Antigen Positive     Clostridium Difficile Toxin A/B Positive             See below for Radiology    Assessment/Plan:  Acute kidney injury with profound azotemia, POA- improving   Hyperkalemia due to NOE, POA- resolved   Large AG profound metabolic acidosis, POA- resolved   Reported 2 mos H/O vomiting and diarrhea   C.diff infection and diarrhea, POA  Upper respiratory symptoms with sinus congestion and sinus pressure, POA- improving  Suspected Aspiration Pneumonia, POA  Hyponatremia , POA- resolved   Hypotension due to severe volume depletion, POA  Alcohol abuse and dependency   Substance abuse mood disorder   Major depression and Suicidal ideation, POA  PEC status , POA        Hx- PAF, HTN, NICMO     Plan-  S/P single session hemodialysis yesterday 12/1/24 to correct profound azotemia and severe AG metabolic acidosis.   Renal parameter cont to improve   Fluid management per Nephrology   Librium taper and CIWA scale for alcohol withdrawal   Parenteral Thiamine 500 mg IV tid x 48h then transitioned to oral   Replace magnesium with goal >2.0  Continue other supportive treatment    Course of Zosyn for presumed aspiration pneumonitis as planned   Antihistamines and decongestant for sinus symptoms   Oral vancomycin initiated for C.diff infection and diarrhea   Questran added to mitigate diarrhea   PEC continued   Psych initiated Pt on Sertraline and Seroquel   Suggested inpatient psychiatric hospitalization when medically stable.   CM consulted   Monitor course        VTE prophylaxis: Heparin      Patient condition:  Fair     Anticipated discharge and Disposition:     Inpatient Psych placement       All diagnosis and differential diagnosis have been reviewed; assessment and plan has been documented; I have personally reviewed the labs and test results that are presently available; I have reviewed the patients medication list; I have reviewed the consulting providers response and recommendations. I have reviewed or attempted to review medical records based upon their availability    All of the patient's questions have been  addressed and answered. Patient's is agreeable to the above stated plan. I will continue to monitor closely and make adjustments to medical management as needed.    Portions of this note dictated using EMR integrated voice recognition software, and may be subject to voice recognition errors not corrected at proofreading. Please contact writer for clarification if needed.   _____________________________________________________________________    Malnutrition Status:  Nutrition consulted. Most recent weight and BMI monitored-     Measurements:  Wt Readings from Last 1 Encounters:   12/03/24 97.2 kg (214 lb 4.8 oz)   Body mass index is 29.89 kg/m².    Patient has been screened and assessed by RD.    Malnutrition Type:  Context:    Level:      Malnutrition Characteristic Summary:       Interventions/Recommendations (treatment strategy):        Scheduled Med:   cetirizine  10 mg Oral Daily    chlordiazepoxide  25 mg Oral BID    cholestyramine-aspartame  4 grams of anhydrous  cholestyramine Oral BID    fluticasone propionate  2 spray Each Nostril BID    folic acid  1 mg Oral Daily    heparin (porcine)  5,000 Units Subcutaneous Q12H    midodrine  7.5 mg Oral TID    multivitamin  1 tablet Oral Daily    mupirocin   Nasal BID    piperacillin-tazobactam (Zosyn) IV (PEDS and ADULTS) (extended infusion is not appropriate)  4.5 g Intravenous Q12H    pseudoephedrine  60 mg Oral BID    QUEtiapine  50 mg Oral QHS    sertraline  50 mg Oral Daily    thiamine  100 mg Oral BID    vancomycin  125 mg Oral Q6H      Continuous Infusions:   0.9% NaCl   Intravenous Continuous 125 mL/hr at 12/02/24 2352 New Bag at 12/02/24 2352      PRN Meds:    Current Facility-Administered Medications:     acetaminophen, 1,000 mg, Oral, Q6H PRN    aluminum-magnesium hydroxide-simethicone, 30 mL, Oral, QID PRN    bisacodyL, 10 mg, Rectal, Daily PRN    dextrose 10%, 12.5 g, Intravenous, PRN    dextrose 10%, 25 g, Intravenous, PRN    glucagon (human recombinant), 1 mg, Intramuscular, PRN    glucose, 16 g, Oral, PRN    glucose, 24 g, Oral, PRN    heparin, porcine (PF), 5 mL, Intra-Catheter, PRN    melatonin, 6 mg, Oral, Nightly PRN    naloxone, 0.02 mg, Intravenous, PRN    ondansetron, 4 mg, Intravenous, Q4H PRN    prochlorperazine, 5 mg, Intravenous, Q6H PRN    senna-docusate 8.6-50 mg, 1 tablet, Oral, BID PRN    sodium chloride 0.9%, 10 mL, Intravenous, PRN         Albert Toledo MD  Department of Hospital Medicine   [facility]   12/03/2024

## 2024-12-04 LAB
ALBUMIN SERPL-MCNC: 2.6 G/DL (ref 3.4–4.8)
BASOPHILS # BLD AUTO: 0.03 X10(3)/MCL
BASOPHILS NFR BLD AUTO: 0.8 %
BUN SERPL-MCNC: 28.1 MG/DL (ref 8.4–25.7)
CALCIUM SERPL-MCNC: 8.1 MG/DL (ref 8.8–10)
CHLORIDE SERPL-SCNC: 103 MMOL/L (ref 98–107)
CO2 SERPL-SCNC: 28 MMOL/L (ref 23–31)
CREAT SERPL-MCNC: 1.08 MG/DL (ref 0.72–1.25)
EOSINOPHIL # BLD AUTO: 0.34 X10(3)/MCL (ref 0–0.9)
EOSINOPHIL NFR BLD AUTO: 8.9 %
ERYTHROCYTE [DISTWIDTH] IN BLOOD BY AUTOMATED COUNT: 14.4 % (ref 11.5–17)
ETHYLENE GLYCOL SERPLBLD-MCNC: NOT DETECTED MG/DL
GFR SERPLBLD CREATININE-BSD FMLA CKD-EPI: >60 ML/MIN/1.73/M2
GLUCOSE SERPL-MCNC: 102 MG/DL (ref 82–115)
HCT VFR BLD AUTO: 27.9 % (ref 42–52)
HGB BLD-MCNC: 9 G/DL (ref 14–18)
IMM GRANULOCYTES # BLD AUTO: 0.01 X10(3)/MCL (ref 0–0.04)
IMM GRANULOCYTES NFR BLD AUTO: 0.3 %
LYMPHOCYTES # BLD AUTO: 1.3 X10(3)/MCL (ref 0.6–4.6)
LYMPHOCYTES NFR BLD AUTO: 33.9 %
MAGNESIUM SERPL-MCNC: 1.3 MG/DL (ref 1.6–2.6)
MCH RBC QN AUTO: 29.7 PG (ref 27–31)
MCHC RBC AUTO-ENTMCNC: 32.3 G/DL (ref 33–36)
MCV RBC AUTO: 92.1 FL (ref 80–94)
MONOCYTES # BLD AUTO: 0.25 X10(3)/MCL (ref 0.1–1.3)
MONOCYTES NFR BLD AUTO: 6.5 %
NEUTROPHILS # BLD AUTO: 1.9 X10(3)/MCL (ref 2.1–9.2)
NEUTROPHILS NFR BLD AUTO: 49.6 %
NRBC BLD AUTO-RTO: 0 %
PHOSPHATE SERPL-MCNC: 1.6 MG/DL (ref 2.3–4.7)
PLATELET # BLD AUTO: 203 X10(3)/MCL (ref 130–400)
PMV BLD AUTO: 10 FL (ref 7.4–10.4)
POCT GLUCOSE: 97 MG/DL (ref 70–110)
POTASSIUM SERPL-SCNC: 4 MMOL/L (ref 3.5–5.1)
RBC # BLD AUTO: 3.03 X10(6)/MCL (ref 4.7–6.1)
SODIUM SERPL-SCNC: 140 MMOL/L (ref 136–145)
WBC # BLD AUTO: 3.83 X10(3)/MCL (ref 4.5–11.5)

## 2024-12-04 PROCEDURE — 36415 COLL VENOUS BLD VENIPUNCTURE: CPT | Performed by: INTERNAL MEDICINE

## 2024-12-04 PROCEDURE — 25000003 PHARM REV CODE 250: Performed by: INTERNAL MEDICINE

## 2024-12-04 PROCEDURE — 63600175 PHARM REV CODE 636 W HCPCS: Performed by: INTERNAL MEDICINE

## 2024-12-04 PROCEDURE — 51702 INSERT TEMP BLADDER CATH: CPT

## 2024-12-04 PROCEDURE — 80069 RENAL FUNCTION PANEL: CPT | Performed by: STUDENT IN AN ORGANIZED HEALTH CARE EDUCATION/TRAINING PROGRAM

## 2024-12-04 PROCEDURE — 25000003 PHARM REV CODE 250

## 2024-12-04 PROCEDURE — 85025 COMPLETE CBC W/AUTO DIFF WBC: CPT | Performed by: STUDENT IN AN ORGANIZED HEALTH CARE EDUCATION/TRAINING PROGRAM

## 2024-12-04 PROCEDURE — 51701 INSERT BLADDER CATHETER: CPT

## 2024-12-04 PROCEDURE — 27000207 HC ISOLATION

## 2024-12-04 PROCEDURE — 25000003 PHARM REV CODE 250: Performed by: NURSE PRACTITIONER

## 2024-12-04 PROCEDURE — 21400001 HC TELEMETRY ROOM

## 2024-12-04 PROCEDURE — 51798 US URINE CAPACITY MEASURE: CPT

## 2024-12-04 PROCEDURE — 83735 ASSAY OF MAGNESIUM: CPT | Performed by: INTERNAL MEDICINE

## 2024-12-04 RX ORDER — MAGNESIUM SULFATE HEPTAHYDRATE 40 MG/ML
4 INJECTION, SOLUTION INTRAVENOUS ONCE
Status: COMPLETED | OUTPATIENT
Start: 2024-12-04 | End: 2024-12-04

## 2024-12-04 RX ORDER — TAMSULOSIN HYDROCHLORIDE 0.4 MG/1
0.4 CAPSULE ORAL DAILY
Status: DISCONTINUED | OUTPATIENT
Start: 2024-12-05 | End: 2024-12-10 | Stop reason: HOSPADM

## 2024-12-04 RX ORDER — SODIUM,POTASSIUM PHOSPHATES 280-250MG
2 POWDER IN PACKET (EA) ORAL 3 TIMES DAILY
Status: COMPLETED | OUTPATIENT
Start: 2024-12-04 | End: 2024-12-04

## 2024-12-04 RX ORDER — SERTRALINE HYDROCHLORIDE 50 MG/1
100 TABLET, FILM COATED ORAL DAILY
Status: DISCONTINUED | OUTPATIENT
Start: 2024-12-05 | End: 2024-12-10 | Stop reason: HOSPADM

## 2024-12-04 RX ADMIN — VANCOMYCIN HYDROCHLORIDE 125 MG: 125 CAPSULE ORAL at 05:12

## 2024-12-04 RX ADMIN — MIDODRINE HYDROCHLORIDE 2.5 MG: 2.5 TABLET ORAL at 09:12

## 2024-12-04 RX ADMIN — CHLORDIAZEPOXIDE HYDROCHLORIDE 25 MG: 25 CAPSULE ORAL at 09:12

## 2024-12-04 RX ADMIN — PIPERACILLIN SODIUM AND TAZOBACTAM SODIUM 4.5 G: 4; .5 INJECTION, POWDER, LYOPHILIZED, FOR SOLUTION INTRAVENOUS at 09:12

## 2024-12-04 RX ADMIN — MUPIROCIN: 20 OINTMENT TOPICAL at 09:12

## 2024-12-04 RX ADMIN — MAGNESIUM SULFATE HEPTAHYDRATE 4 G: 40 INJECTION, SOLUTION INTRAVENOUS at 08:12

## 2024-12-04 RX ADMIN — FLUTICASONE PROPIONATE 100 MCG: 50 SPRAY, METERED NASAL at 08:12

## 2024-12-04 RX ADMIN — HEPARIN SODIUM 5000 UNITS: 5000 INJECTION, SOLUTION INTRAVENOUS; SUBCUTANEOUS at 08:12

## 2024-12-04 RX ADMIN — POTASSIUM & SODIUM PHOSPHATES POWDER PACK 280-160-250 MG 2 PACKET: 280-160-250 PACK at 09:12

## 2024-12-04 RX ADMIN — THIAMINE HCL TAB 100 MG 100 MG: 100 TAB at 08:12

## 2024-12-04 RX ADMIN — QUETIAPINE FUMARATE 50 MG: 25 TABLET ORAL at 08:12

## 2024-12-04 RX ADMIN — BUTALBITAL, ACETAMINOPHEN, AND CAFFEINE 1 TABLET: 325; 50; 40 TABLET ORAL at 09:12

## 2024-12-04 RX ADMIN — SERTRALINE HYDROCHLORIDE 50 MG: 50 TABLET ORAL at 09:12

## 2024-12-04 RX ADMIN — PSEUDOEPHEDRINE HCL 60 MG: 30 TABLET, FILM COATED ORAL at 11:12

## 2024-12-04 RX ADMIN — BUTALBITAL, ACETAMINOPHEN, AND CAFFEINE 1 TABLET: 325; 50; 40 TABLET ORAL at 12:12

## 2024-12-04 RX ADMIN — CETIRIZINE HYDROCHLORIDE 10 MG: 10 TABLET, FILM COATED ORAL at 09:12

## 2024-12-04 RX ADMIN — CHLORDIAZEPOXIDE HYDROCHLORIDE 25 MG: 25 CAPSULE ORAL at 08:12

## 2024-12-04 RX ADMIN — FOLIC ACID 1 MG: 1 TABLET ORAL at 09:12

## 2024-12-04 RX ADMIN — CHOLESTYRAMINE 4 GRAMS OF ANHYDROUS CHOLESTYRAMINE: 4 POWDER, FOR SUSPENSION ORAL at 08:12

## 2024-12-04 RX ADMIN — SODIUM CHLORIDE: 4.5 INJECTION, SOLUTION INTRAVENOUS at 03:12

## 2024-12-04 RX ADMIN — POTASSIUM & SODIUM PHOSPHATES POWDER PACK 280-160-250 MG 2 PACKET: 280-160-250 PACK at 02:12

## 2024-12-04 RX ADMIN — CHOLESTYRAMINE 4 GRAMS OF ANHYDROUS CHOLESTYRAMINE: 4 POWDER, FOR SUSPENSION ORAL at 09:12

## 2024-12-04 RX ADMIN — THIAMINE HCL TAB 100 MG 100 MG: 100 TAB at 09:12

## 2024-12-04 RX ADMIN — VANCOMYCIN HYDROCHLORIDE 125 MG: 125 CAPSULE ORAL at 11:12

## 2024-12-04 RX ADMIN — FLUTICASONE PROPIONATE 100 MCG: 50 SPRAY, METERED NASAL at 09:12

## 2024-12-04 RX ADMIN — POTASSIUM & SODIUM PHOSPHATES POWDER PACK 280-160-250 MG 2 PACKET: 280-160-250 PACK at 08:12

## 2024-12-04 RX ADMIN — VANCOMYCIN HYDROCHLORIDE 125 MG: 125 CAPSULE ORAL at 12:12

## 2024-12-04 RX ADMIN — MUPIROCIN: 20 OINTMENT TOPICAL at 08:12

## 2024-12-04 RX ADMIN — HEPARIN SODIUM 5000 UNITS: 5000 INJECTION, SOLUTION INTRAVENOUS; SUBCUTANEOUS at 09:12

## 2024-12-04 RX ADMIN — THERA TABS 1 TABLET: TAB at 09:12

## 2024-12-04 NOTE — PROGRESS NOTES
"12/4/2024  Gabe Barnett   1965   [id number]        Psychiatry Progress Note       SUBJECTIVE:   Gabe Barnett is a 59-year-old male admitted under PEC at [facility] with a history of alcohol abuse, seizures, atrial fibrillation (previously on Eliquis, discontinued), hypertension, and non-ischemic cardiomyopathy (NICMO). He was brought to the ED by EMS after being found in a motel bathtub covered in feces and surrounded by empty alcohol bottles. The patient reports quitting alcohol one month ago but admits to a history of heavy drinking. Upon arrival at the ED, he was tremulous and agitated, which improved with Serax administration. He revealed that he entered the bathtub with the intention of breaking an alcohol bottle to "cut my vein," indicating recent suicidal ideation. He endorses feeling very depressed and has not taken any prescribed medications in several months. The patient also reports a history of vomiting and watery diarrhea over the past several months, though he is unsure if there has been any blood in his emesis or stool. He has been living in a hotel and experiencing significant functional and emotional decline.     Seen at the bedside where he is awake, polite, and cooperative but with poor eye-contact. When asked how he is feeling he reports "I'm tired". When asked if he is tired of living he states "that too". Reports depressed mood and displays dysthymic, constricted, and mood-congruent affect. Denies active suicidal ideations, but states he would most likely harm himself following discharge. Reports no issues with sleep or appetite. No evidence of psychosis at this time. Goal-directed thought process without delusional ideations. Denies homicidal ideations, auditory/visual hallucinations, or paranoia.        Current Medications:   Scheduled Meds:    cetirizine  10 mg Oral Daily    chlordiazepoxide  25 mg Oral BID    cholestyramine-aspartame  4 grams of anhydrous cholestyramine Oral BID    fluticasone " propionate  2 spray Each Nostril BID    folic acid  1 mg Oral Daily    heparin (porcine)  5,000 Units Subcutaneous Q12H    magnesium sulfate IVPB  4 g Intravenous Once    multivitamin  1 tablet Oral Daily    mupirocin   Nasal BID    piperacillin-tazobactam (Zosyn) IV (PEDS and ADULTS) (extended infusion is not appropriate)  4.5 g Intravenous Q12H    potassium, sodium phosphates  2 packet Oral TID    pseudoephedrine  60 mg Oral BID    QUEtiapine  50 mg Oral QHS    sertraline  50 mg Oral Daily    thiamine  100 mg Oral BID    vancomycin  125 mg Oral Q6H      PRN Meds:   Current Facility-Administered Medications:     acetaminophen, 1,000 mg, Oral, Q6H PRN    aluminum-magnesium hydroxide-simethicone, 30 mL, Oral, QID PRN    bisacodyL, 10 mg, Rectal, Daily PRN    butalbital-acetaminophen-caffeine -40 mg, 1 tablet, Oral, Q4H PRN    dextrose 10%, 12.5 g, Intravenous, PRN    dextrose 10%, 25 g, Intravenous, PRN    glucagon (human recombinant), 1 mg, Intramuscular, PRN    glucose, 16 g, Oral, PRN    glucose, 24 g, Oral, PRN    heparin, porcine (PF), 5 mL, Intra-Catheter, PRN    melatonin, 6 mg, Oral, Nightly PRN    naloxone, 0.02 mg, Intravenous, PRN    ondansetron, 4 mg, Intravenous, Q4H PRN    prochlorperazine, 5 mg, Intravenous, Q6H PRN    senna-docusate 8.6-50 mg, 1 tablet, Oral, BID PRN    sodium chloride 0.9%, 10 mL, Intravenous, PRN   Psychotherapeutics (From admission, onward)      Start     Stop Route Frequency Ordered    12/03/24 2100  chlordiazepoxide capsule 25 mg         -- Oral 2 times daily 12/03/24 0956    12/01/24 2100  QUEtiapine tablet 50 mg         -- Oral Nightly 12/01/24 1227    12/01/24 1230  sertraline tablet 50 mg         -- Oral Daily 12/01/24 1227            Allergies:   Review of patient's allergies indicates:   Allergen Reactions    Niacin Other (See Comments)     I get flushed and burn up        OBJECTIVE:   Vitals   Vitals:    12/04/24 0840   BP: (!) 140/92   Pulse: 88   Resp: (!) 21    Temp: 98.1 °F (36.7 °C)        Labs/Imaging/Studies:   Recent Results (from the past 36 hours)   Renal Function Panel    Collection Time: 12/03/24  3:29 AM   Result Value Ref Range    Sodium 145 136 - 145 mmol/L    Potassium 4.4 3.5 - 5.1 mmol/L    Chloride 102 98 - 107 mmol/L    CO2 31 23 - 31 mmol/L    Glucose 119 (H) 82 - 115 mg/dL    Blood Urea Nitrogen 76.4 (H) 8.4 - 25.7 mg/dL    Creatinine 2.46 (H) 0.72 - 1.25 mg/dL    Calcium 7.6 (L) 8.8 - 10.0 mg/dL    Albumin 2.8 (L) 3.4 - 4.8 g/dL    Phosphorus Level 2.8 2.3 - 4.7 mg/dL    eGFR 29 mL/min/1.73/m2   Magnesium    Collection Time: 12/03/24  3:29 AM   Result Value Ref Range    Magnesium Level 1.10 (L) 1.60 - 2.60 mg/dL   CBC with Differential    Collection Time: 12/03/24  3:29 AM   Result Value Ref Range    WBC 3.75 (L) 4.50 - 11.50 x10(3)/mcL    RBC 3.14 (L) 4.70 - 6.10 x10(6)/mcL    Hgb 9.3 (L) 14.0 - 18.0 g/dL    Hct 27.8 (L) 42.0 - 52.0 %    MCV 88.5 80.0 - 94.0 fL    MCH 29.6 27.0 - 31.0 pg    MCHC 33.5 33.0 - 36.0 g/dL    RDW 14.2 11.5 - 17.0 %    Platelet 212 130 - 400 x10(3)/mcL    MPV 10.1 7.4 - 10.4 fL    Neut % 57.6 %    Lymph % 28.8 %    Mono % 5.6 %    Eos % 6.7 %    Basophil % 0.8 %    Lymph # 1.08 0.6 - 4.6 x10(3)/mcL    Neut # 2.16 2.1 - 9.2 x10(3)/mcL    Mono # 0.21 0.1 - 1.3 x10(3)/mcL    Eos # 0.25 0 - 0.9 x10(3)/mcL    Baso # 0.03 <=0.2 x10(3)/mcL    IG# 0.02 0 - 0.04 x10(3)/mcL    IG% 0.5 %    NRBC% 0.0 %   Renal Function Panel    Collection Time: 12/04/24  3:45 AM   Result Value Ref Range    Sodium 140 136 - 145 mmol/L    Potassium 4.0 3.5 - 5.1 mmol/L    Chloride 103 98 - 107 mmol/L    CO2 28 23 - 31 mmol/L    Glucose 102 82 - 115 mg/dL    Blood Urea Nitrogen 28.1 (H) 8.4 - 25.7 mg/dL    Creatinine 1.08 0.72 - 1.25 mg/dL    Calcium 8.1 (L) 8.8 - 10.0 mg/dL    Albumin 2.6 (L) 3.4 - 4.8 g/dL    Phosphorus Level 1.6 (L) 2.3 - 4.7 mg/dL    eGFR >60 mL/min/1.73/m2   Magnesium    Collection Time: 12/04/24  3:45 AM   Result Value Ref Range     Magnesium Level 1.30 (L) 1.60 - 2.60 mg/dL   CBC with Differential    Collection Time: 12/04/24  3:45 AM   Result Value Ref Range    WBC 3.83 (L) 4.50 - 11.50 x10(3)/mcL    RBC 3.03 (L) 4.70 - 6.10 x10(6)/mcL    Hgb 9.0 (L) 14.0 - 18.0 g/dL    Hct 27.9 (L) 42.0 - 52.0 %    MCV 92.1 80.0 - 94.0 fL    MCH 29.7 27.0 - 31.0 pg    MCHC 32.3 (L) 33.0 - 36.0 g/dL    RDW 14.4 11.5 - 17.0 %    Platelet 203 130 - 400 x10(3)/mcL    MPV 10.0 7.4 - 10.4 fL    Neut % 49.6 %    Lymph % 33.9 %    Mono % 6.5 %    Eos % 8.9 %    Basophil % 0.8 %    Lymph # 1.30 0.6 - 4.6 x10(3)/mcL    Neut # 1.90 (L) 2.1 - 9.2 x10(3)/mcL    Mono # 0.25 0.1 - 1.3 x10(3)/mcL    Eos # 0.34 0 - 0.9 x10(3)/mcL    Baso # 0.03 <=0.2 x10(3)/mcL    IG# 0.01 0 - 0.04 x10(3)/mcL    IG% 0.3 %    NRBC% 0.0 %        Psychiatric Mental Status Exam:  General Appearance: appears stated age, dressed in hospital garb, lying in bed, in no acute distress  Arousal: drowsy  Behavior: polite, under good behavioral control, poor eye contact  Movements and Motor Activity: no tics, no tremors, no akathisia, no dystonia, no evidence of tardive dyskinesia  Orientation: oriented to person, place, and time  Speech: normal rate, rhythm, volume, tone and pitch  Mood: Depressed  Affect: mood-congruent, constricted, dysthymic  Thought Process: goal-directed  Associations: no loosening of associations  Thought Content and Perceptions: + passive suicidal ideation, no homicidal ideation, no hallucinations, no delusions  Recent and Remote Memory: grossly intact; per interview/observation with patient  Attention and Concentration: grossly intact; per interview/observation with patient  Fund of Knowledge: grossly intact; based on history, vocabulary, fund of knowledge, syntax, grammar, and content  Insight: adequate; based on understanding of severity of illness and HPI  Judgment: questionable; based on patient's behavior and HPI    ASSESSMENT/PLAN:   Problems Addressed/Diagnoses:  Major  Depressive Disorder, 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  Disposition  Recommend inpatient psychiatric hospitalization when medically stable.  Will continue to follow        Yakov Back

## 2024-12-04 NOTE — PROGRESS NOTES
Nephrology consult follow up note    HPI:      Gabe Barnett is a 59 y.o. male who presents after being found altered. Past medical history significant for AFib, hypertension, and nonischemic cardiomyopathy. Patient was found in a motel tub coverage and feces with empty alcohol bottles. Supposedly he had a planned to break 1 of the bottles and tried to kill himself by slitting his wrists. He denies any toxic alcohol ingestions. States that he feels depressed. He does endorse occasional nausea, vomiting, and diarrhea. At admission he was found to have severe acute kidney injury with BUN of 247, and creatinine of 16. Nephrology consulted for acute kidney injury. He denies chest pain, shortness of breath, or lower extremity edema. Patient is overall a very poor historian.  Patient was urgently dialyzed after admission due to severe azotemia.    Interval history:     12/02/2024  No acute events overnight.  Patient more interactive today.  Dialyzed yesterday.  Making good urine output, 3.8 L yesterday.    12/03/2024  Patient making excellent urine output, 7.4 L yesterday.  He estimates that he drank about 3-4 L of water yesterday.  No chest pain, shortness of breath, abdominal pain, nausea, vomiting, or lower extremity edema.    12/04/2024   Lying down in the bed.  Reports that he has rough time making urine.  Excellent urine output noted.  No shortness of breath noted.  Improved renal functions noted.     Review of Systems:     Comprehensive 10pt ROS negative except as noted per history.    Past medical, family, surgical, and social history reviewed and unchanged from initial consult note.     Objective:       VITAL SIGNS: 24 HR MIN & MAX LAST    Temp  Min: 97.5 °F (36.4 °C)  Max: 98.2 °F (36.8 °C)  98.1 °F (36.7 °C)        BP  Min: 99/62  Max: 146/82  (!) 140/92     Pulse  Min: 73  Max: 88  88     No data recorded  18    SpO2  Min: 93 %  Max: 97 %  97 %      GEN:  Moderately developed and nourished.  Awake alert and  oriented.  HEENT: Conjunctiva anicteric, pupils equal   CV: RRR +S1,S2 without murmur  PULM: CTAB, unlabored  ABD: Soft, NT/ND abdomen with NABS  EXT: No cyanosis or edema  SKIN: Warm and dry  PSYCH: Awake, alert and conversant.  Dialysis access:  Right IJ Vas-Cath            Component Value Date/Time     12/04/2024 0345     12/03/2024 0329    K 4.0 12/04/2024 0345    K 4.4 12/03/2024 0329    CO2 28 12/04/2024 0345    CO2 31 12/03/2024 0329    BUN 28.1 (H) 12/04/2024 0345    BUN 76.4 (H) 12/03/2024 0329    CREATININE 1.08 12/04/2024 0345    CREATININE 2.46 (H) 12/03/2024 0329    CALCIUM 8.1 (L) 12/04/2024 0345    CALCIUM 7.6 (L) 12/03/2024 0329    PHOS 1.6 (L) 12/04/2024 0345            Component Value Date/Time    WBC 3.83 (L) 12/04/2024 0345    WBC 3.75 (L) 12/03/2024 0329    HGB 9.0 (L) 12/04/2024 0345    HGB 9.3 (L) 12/03/2024 0329    HCT 27.9 (L) 12/04/2024 0345    HCT 27.8 (L) 12/03/2024 0329     12/04/2024 0345     12/03/2024 0329           Assessment / Plan:       Acute kidney injury - unknown baseline renal function  Severe azotemia  Hyperkalemia  Severe anion gap metabolic acidosis  Suicidal ideation     Plan:  Kidney functions has improved.  No need for further dialysis.  Will request nursing staff to remove right IJ temporary dialysis catheter.  Magnesium is being replaced.  Phosphorus needs to be replaced.  Midodrine needs to be discontinued.  We will sign off this case.  Please reconsult if needed.

## 2024-12-04 NOTE — PLAN OF CARE
Nurse Almanza reports that maybe tomorrow pt will be ready for inpt psych admission and treatment.  CM will call in referral for placement once MD document in notes that pt medically stable for discharge.

## 2024-12-05 LAB
ANION GAP SERPL CALC-SCNC: 10 MEQ/L
BACTERIA BLD CULT: NORMAL
BACTERIA BLD CULT: NORMAL
BUN SERPL-MCNC: 12.3 MG/DL (ref 8.4–25.7)
CALCIUM SERPL-MCNC: 8.7 MG/DL (ref 8.8–10)
CHLORIDE SERPL-SCNC: 108 MMOL/L (ref 98–107)
CO2 SERPL-SCNC: 22 MMOL/L (ref 23–31)
CREAT SERPL-MCNC: 1.06 MG/DL (ref 0.72–1.25)
CREAT/UREA NIT SERPL: 12
GFR SERPLBLD CREATININE-BSD FMLA CKD-EPI: >60 ML/MIN/1.73/M2
GLUCOSE SERPL-MCNC: 95 MG/DL (ref 82–115)
MAGNESIUM SERPL-MCNC: 1.2 MG/DL (ref 1.6–2.6)
PHOSPHATE SERPL-MCNC: 2 MG/DL (ref 2.3–4.7)
POCT GLUCOSE: 78 MG/DL (ref 70–110)
POTASSIUM SERPL-SCNC: 4.4 MMOL/L (ref 3.5–5.1)
SODIUM SERPL-SCNC: 140 MMOL/L (ref 136–145)

## 2024-12-05 PROCEDURE — 25000003 PHARM REV CODE 250: Performed by: INTERNAL MEDICINE

## 2024-12-05 PROCEDURE — 21400001 HC TELEMETRY ROOM

## 2024-12-05 PROCEDURE — 36415 COLL VENOUS BLD VENIPUNCTURE: CPT | Performed by: INTERNAL MEDICINE

## 2024-12-05 PROCEDURE — 84100 ASSAY OF PHOSPHORUS: CPT | Performed by: INTERNAL MEDICINE

## 2024-12-05 PROCEDURE — 25000003 PHARM REV CODE 250

## 2024-12-05 PROCEDURE — 25000003 PHARM REV CODE 250: Performed by: NURSE PRACTITIONER

## 2024-12-05 PROCEDURE — 97161 PT EVAL LOW COMPLEX 20 MIN: CPT

## 2024-12-05 PROCEDURE — 80048 BASIC METABOLIC PNL TOTAL CA: CPT | Performed by: INTERNAL MEDICINE

## 2024-12-05 PROCEDURE — 63600175 PHARM REV CODE 636 W HCPCS: Performed by: INTERNAL MEDICINE

## 2024-12-05 PROCEDURE — 27000207 HC ISOLATION

## 2024-12-05 PROCEDURE — 83735 ASSAY OF MAGNESIUM: CPT | Performed by: INTERNAL MEDICINE

## 2024-12-05 RX ORDER — SODIUM,POTASSIUM PHOSPHATES 280-250MG
1 POWDER IN PACKET (EA) ORAL
Status: DISCONTINUED | OUTPATIENT
Start: 2024-12-06 | End: 2024-12-07

## 2024-12-05 RX ORDER — MAGNESIUM SULFATE HEPTAHYDRATE 40 MG/ML
2 INJECTION, SOLUTION INTRAVENOUS
Status: COMPLETED | OUTPATIENT
Start: 2024-12-05 | End: 2024-12-06

## 2024-12-05 RX ADMIN — ACETAMINOPHEN 1000 MG: 500 TABLET ORAL at 08:12

## 2024-12-05 RX ADMIN — THIAMINE HCL TAB 100 MG 100 MG: 100 TAB at 08:12

## 2024-12-05 RX ADMIN — ACETAMINOPHEN 1000 MG: 500 TABLET ORAL at 06:12

## 2024-12-05 RX ADMIN — TAMSULOSIN HYDROCHLORIDE 0.4 MG: 0.4 CAPSULE ORAL at 09:12

## 2024-12-05 RX ADMIN — MUPIROCIN: 20 OINTMENT TOPICAL at 09:12

## 2024-12-05 RX ADMIN — HEPARIN SODIUM 5000 UNITS: 5000 INJECTION, SOLUTION INTRAVENOUS; SUBCUTANEOUS at 08:12

## 2024-12-05 RX ADMIN — THERA TABS 1 TABLET: TAB at 09:12

## 2024-12-05 RX ADMIN — VANCOMYCIN HYDROCHLORIDE 125 MG: 125 CAPSULE ORAL at 12:12

## 2024-12-05 RX ADMIN — CETIRIZINE HYDROCHLORIDE 10 MG: 10 TABLET, FILM COATED ORAL at 09:12

## 2024-12-05 RX ADMIN — QUETIAPINE FUMARATE 50 MG: 25 TABLET ORAL at 08:12

## 2024-12-05 RX ADMIN — CHOLESTYRAMINE 4 GRAMS OF ANHYDROUS CHOLESTYRAMINE: 4 POWDER, FOR SUSPENSION ORAL at 08:12

## 2024-12-05 RX ADMIN — THIAMINE HCL TAB 100 MG 100 MG: 100 TAB at 09:12

## 2024-12-05 RX ADMIN — MUPIROCIN: 20 OINTMENT TOPICAL at 08:12

## 2024-12-05 RX ADMIN — SERTRALINE HYDROCHLORIDE 100 MG: 50 TABLET ORAL at 09:12

## 2024-12-05 RX ADMIN — CHLORDIAZEPOXIDE HYDROCHLORIDE 25 MG: 25 CAPSULE ORAL at 09:12

## 2024-12-05 RX ADMIN — CHOLESTYRAMINE 4 GRAMS OF ANHYDROUS CHOLESTYRAMINE: 4 POWDER, FOR SUSPENSION ORAL at 09:12

## 2024-12-05 RX ADMIN — FLUTICASONE PROPIONATE 100 MCG: 50 SPRAY, METERED NASAL at 08:12

## 2024-12-05 RX ADMIN — CHLORDIAZEPOXIDE HYDROCHLORIDE 25 MG: 25 CAPSULE ORAL at 08:12

## 2024-12-05 RX ADMIN — MAGNESIUM SULFATE HEPTAHYDRATE 2 G: 40 INJECTION, SOLUTION INTRAVENOUS at 09:12

## 2024-12-05 RX ADMIN — VANCOMYCIN HYDROCHLORIDE 125 MG: 125 CAPSULE ORAL at 05:12

## 2024-12-05 RX ADMIN — VANCOMYCIN HYDROCHLORIDE 125 MG: 125 CAPSULE ORAL at 06:12

## 2024-12-05 RX ADMIN — FOLIC ACID 1 MG: 1 TABLET ORAL at 09:12

## 2024-12-05 RX ADMIN — FLUTICASONE PROPIONATE 100 MCG: 50 SPRAY, METERED NASAL at 09:12

## 2024-12-05 RX ADMIN — HEPARIN SODIUM 5000 UNITS: 5000 INJECTION, SOLUTION INTRAVENOUS; SUBCUTANEOUS at 09:12

## 2024-12-05 NOTE — PROGRESS NOTES
"[facility] Hospital Medicine Progress Note        Chief Complaint: Inpatient Follow-up       HPI:   59 yr old male with PMHx of Alcohol abuse, Seizures( I would assume it was alcohol withdrawal seizure), atrial fibrillation (previously on Eliquis but quit taking), HTN, and NICMO now recovered with LVEF 65-70% on echo 11/30/24 was brought to the ED by  EMS after he was found in a Motel in tub covered in feces surrounded by empty alcohol bottles. Patient reports he quit drinking one month ago. In the ED he was tremulous and agitated on arrival that had  improved with Serax. Has been living in a motel. Reports he got in the tub with intention of breaking one of the alcohol bottles and "cutting my vein". Admits to feeling very depressed. Has not taken any of his medications in several months. Reports several month history of vomiting and watery diarrhea. Unsure if he's noticed any blood in emesis or stool.      VS on arrival: T 95.7, P 98, R 18, BP 96/46, Sats 98% on room air. Initial labs: WBC 9.99, Hgb 11.1, Plt 246, Na 133, K 5.2, Cl 87, bicarb 9, , creatinine 16.67, glucose 131, AST 8, ALT 10, lipase 161. Lactic acid 2.2, CPK, ETOH, TSH, ammonia, Troponin normal.  CT abdomen and pelvis shows patchy ground-glass in the lung bases suggestive of infectious or inflammatory process.  No acute process identified in the abdomen or pelvis. Retroperitoneal ultrasound unremarkable. Pt was admitted to  service . Nephrology was consulted to assist. Pt was placed under PEC due to suicidal ideation and attempt. Psych consulted.      Pt was started on Calcium gluconate, D10 with IV insulin and Lokelma per hyperkalemia protocol. Aggressive IV hydration with bicarb drip, high dose thiamine, Librium taper, folic acid and multivitamin. Blood cultures were drawn and Zosyn initiated for presumed aspiration pneumonia. Stool was collected for C.diff toxins and came back positive. Initiated on oral " Vancomycin. Pt remained with profound metabolic acidosis with large AG and azotemia. Nephrology consulted General Surgery for temporary HD catheter and Hemodialysis initiated. Pt underwent single session dialysis on 12/1/24. UOP increased. No further dialysis needed. Renal function cont to recover and normalized as of 12/4/24. Pt now became more awake, alert and interactive but with flat affect. Diarrhea persisted. Questran added to mitigate stool frequency. Diarrhea improving. Wade catheter removed on 12/3/24. However unable to void spontaneously with >800 ml retention. Wade reinserted 12/4/24. Flomax added. Urology consult requested. PEC continued. Psych initiated Sertraline and Seroquel and inpatient psychiatric hospitalization when medically stable.        Interval Hx:   Pt reports stool is forming and frequency lessened.  C/O unable to void adequately since Wade removed yesterday. Post void volume >800 ml. Wade re inserted today   Labs with normal WBC, Hgb 9.0, Cr 1.0, Mg 1.3- replaced, Phos 1.6- replaced    Case was discussed with patient's nurse and  on the floor.    Objective/physical exam:  General: In no acute distress, afebrile, on RA  Chest: Clear to auscultation bilaterally  Heart: RRR, +S1, S2, no appreciable murmur  Abdomen: Soft, nontender, BS +  MSK: Warm, no lower extremity edema, no clubbing or cyanosis  Neurologic: Alert and oriented    VITAL SIGNS: 24 HRS MIN & MAX LAST   Temp  Min: 97.5 °F (36.4 °C)  Max: 98.2 °F (36.8 °C) 98.1 °F (36.7 °C)   BP  Min: 99/62  Max: 144/93 128/78   Pulse  Min: 73  Max: 88  87   Resp  Min: 18  Max: 21 18   SpO2  Min: 93 %  Max: 97 % 97 %     I have reviewed the following labs:  Recent Labs   Lab 12/02/24  0338 12/03/24  0329 12/04/24  0345   WBC 5.03 3.75* 3.83*   RBC 2.92* 3.14* 3.03*   HGB 8.8* 9.3* 9.0*   HCT 24.0* 27.8* 27.9*   MCV 82.2 88.5 92.1   MCH 30.1 29.6 29.7   MCHC 36.7* 33.5 32.3*   RDW 13.3 14.2 14.4    212 203   MPV 9.8 10.1  10.0     Recent Labs   Lab 11/30/24  1359 11/30/24 2007 12/01/24  0758 12/02/24  0338 12/03/24  0329 12/04/24  0345   *   < > 127* 134* 145 140   K 5.2*   < > 3.2* 3.2* 4.4 4.0   CL 87*   < > 82* 90* 102 103   CO2 9*   < > 16* 28 31 28   .0*   < > 219.4* 121.2* 76.4* 28.1*   CREATININE 16.67*   < > 12.99* 5.78* 2.46* 1.08   CALCIUM 8.1*   < > 7.6* 7.3* 7.6* 8.1*   MG 2.20   < >  --  1.50* 1.10* 1.30*   ALBUMIN 3.5  --  2.7* 2.6* 2.8* 2.6*   ALKPHOS 70  --  56 50  --   --    ALT 10  --  9 8  --   --    AST 8  --  9 10  --   --    BILITOT 0.5  --  0.4 0.3  --   --     < > = values in this interval not displayed.     Microbiology Results (last 7 days)       Procedure Component Value Units Date/Time    Blood Culture [[id number]]  (Normal) Collected: 11/30/24 2007    Order Status: Completed Specimen: Blood, Venous Updated: 12/04/24 2100     Blood Culture No Growth At 96 Hours    Blood Culture [[id number]]  (Normal) Collected: 11/30/24 2007    Order Status: Completed Specimen: Blood, Venous Updated: 12/04/24 2100     Blood Culture No Growth At 96 Hours    Clostridium Diff Toxin, A & B, EIA [[id number]]  (Abnormal) Collected: 11/30/24 2331    Order Status: Completed Specimen: Stool Updated: 12/01/24 0840     Clostridium Difficile GDH Antigen Positive     Clostridium Difficile Toxin A/B Positive             See below for Radiology    Assessment/Plan:  Acute kidney injury with profound azotemia, POA- Resolved   Hyperkalemia due to NOE, POA- resolved   Large AG profound metabolic acidosis, POA- resolved   Reported 2 mos H/O vomiting and diarrhea, POA   C.diff infection and diarrhea, POA  Upper respiratory symptoms with sinus congestion and sinus pressure, POA- improving  Suspected Aspiration Pneumonia, POA- completed antibiotic with Zosyn  Hyponatremia , POA- resolved   Hypotension due to severe volume depletion, POA- resolved   Acute urinary retention - 12/4/24- s/p re insertion of Wade catheter   Alcohol  abuse and dependency   Substance abuse mood disorder   Major depression and Suicidal ideation, POA  PEC status , POA        Hx- PAF, HTN, NICMO     Plan-  S/P single session hemodialysis on  12/1/24 to correct profound azotemia and severe AG metabolic acidosis.   Renal parameter currently normalized   IVF discontinued as of 12/4/24  Librium taper and CIWA scale for alcohol withdrawal   Parenteral Thiamine 500 mg IV tid x 48h then transitioned to oral   Replace magnesium with goal >2.0  Continue other supportive treatment   Zosyn for presumed aspiration pneumonitis - completed   Antihistamines and decongestant for sinus symptoms   Oral vancomycin for C.diff infection and diarrhea   Questran added to mitigate diarrhea   Wade catheter re inserted today 12/4/24 for urinary retention. Flomax added    Urology consult requested   PEC continued   Psych initiated  Sertraline and Seroquel   Suggested inpatient psychiatric hospitalization when medically stable.   CM consulted   Monitor course         VTE prophylaxis: Heparin      Patient condition:  Fair     Anticipated discharge and Disposition:     Inpatient Psych placement       All diagnosis and differential diagnosis have been reviewed; assessment and plan has been documented; I have personally reviewed the labs and test results that are presently available; I have reviewed the patients medication list; I have reviewed the consulting providers response and recommendations. I have reviewed or attempted to review medical records based upon their availability    All of the patient's questions have been  addressed and answered. Patient's is agreeable to the above stated plan. I will continue to monitor closely and make adjustments to medical management as needed.    Portions of this note dictated using EMR integrated voice recognition software, and may be subject to voice recognition errors not corrected at proofreading. Please contact writer for clarification if needed.    _____________________________________________________________________    Malnutrition Status:  Nutrition consulted. Most recent weight and BMI monitored-     Measurements:  Wt Readings from Last 1 Encounters:   12/03/24 97.2 kg (214 lb 4.8 oz)   Body mass index is 29.89 kg/m².    Patient has been screened and assessed by RD.    Malnutrition Type:  Context:    Level:      Malnutrition Characteristic Summary:       Interventions/Recommendations (treatment strategy):        Scheduled Med:   cetirizine  10 mg Oral Daily    chlordiazepoxide  25 mg Oral BID    cholestyramine-aspartame  4 grams of anhydrous cholestyramine Oral BID    fluticasone propionate  2 spray Each Nostril BID    folic acid  1 mg Oral Daily    heparin (porcine)  5,000 Units Subcutaneous Q12H    multivitamin  1 tablet Oral Daily    mupirocin   Nasal BID    piperacillin-tazobactam (Zosyn) IV (PEDS and ADULTS) (extended infusion is not appropriate)  4.5 g Intravenous Q12H    QUEtiapine  50 mg Oral QHS    [START ON 12/5/2024] sertraline  100 mg Oral Daily    thiamine  100 mg Oral BID    vancomycin  125 mg Oral Q6H      Continuous Infusions:     PRN Meds:    Current Facility-Administered Medications:     acetaminophen, 1,000 mg, Oral, Q6H PRN    aluminum-magnesium hydroxide-simethicone, 30 mL, Oral, QID PRN    bisacodyL, 10 mg, Rectal, Daily PRN    butalbital-acetaminophen-caffeine -40 mg, 1 tablet, Oral, Q4H PRN    dextrose 10%, 12.5 g, Intravenous, PRN    dextrose 10%, 25 g, Intravenous, PRN    glucagon (human recombinant), 1 mg, Intramuscular, PRN    glucose, 16 g, Oral, PRN    glucose, 24 g, Oral, PRN    heparin, porcine (PF), 5 mL, Intra-Catheter, PRN    melatonin, 6 mg, Oral, Nightly PRN    naloxone, 0.02 mg, Intravenous, PRN    ondansetron, 4 mg, Intravenous, Q4H PRN    prochlorperazine, 5 mg, Intravenous, Q6H PRN    senna-docusate 8.6-50 mg, 1 tablet, Oral, BID PRN    sodium chloride 0.9%, 10 mL, Intravenous, PRN         Albert Toledo,  MD  Department of Hospital Medicine   [facility]   12/04/2024

## 2024-12-05 NOTE — PROGRESS NOTES
Inpatient Nutrition Evaluation    Admit Date: 11/30/2024   Total duration of encounter: 5 days   Patient Age: 59 y.o.    Nutrition Recommendation/Prescription     -Continue Renal Diet as tolerated.   -Continue MVI, folic acid, and thiamine as medically feasible.   -Monitor wt, labs, and intake.     Nutrition Assessment     Chart Review    Reason Seen: malnutrition screening tool (MST) and follow-up    Malnutrition Screening Tool Results   Have you recently lost weight without trying?: Yes: 2-13 lbs  Have you been eating poorly because of a decreased appetite?: Yes   MST Score: 2   Diagnosis:  Acute kidney injury with profound azotemia, POA  Hyperkalemia due to NOE, POA  Large AG profound metabolic acidosis, POA  Reported 2 mos H/O vomiting and diarrhea   C.diff infection and diarrhea, POA  Suspected Aspiration Pneumonia, POA  Hyponatremia  Hypotension due to severe volume depletion  Alcohol abuse and dependency   Substance abuse mood disorder   Major depression and Suicidal ideation  PEC status    Relevant Medical History:  Alcohol abuse, Seizures, atrial fibrillation (previously on Eliquis but quit taking), HTN, and NICMO     Scheduled Medications:  cetirizine, 10 mg, Daily  chlordiazepoxide, 25 mg, BID  cholestyramine-aspartame, 4 grams of anhydrous cholestyramine, BID  fluticasone propionate, 2 spray, BID  folic acid, 1 mg, Daily  heparin (porcine), 5,000 Units, Q12H  multivitamin, 1 tablet, Daily  mupirocin, , BID  QUEtiapine, 50 mg, QHS  sertraline, 100 mg, Daily  tamsulosin, 0.4 mg, Daily  thiamine, 100 mg, BID  vancomycin, 125 mg, Q6H    Continuous Infusions:     PRN Medications:   Current Facility-Administered Medications:     acetaminophen, 1,000 mg, Oral, Q6H PRN    aluminum-magnesium hydroxide-simethicone, 30 mL, Oral, QID PRN    bisacodyL, 10 mg, Rectal, Daily PRN    butalbital-acetaminophen-caffeine -40 mg, 1 tablet, Oral, Q4H PRN    dextrose 10%, 12.5 g, Intravenous, PRN    dextrose 10%, 25 g,  Intravenous, PRN    glucagon (human recombinant), 1 mg, Intramuscular, PRN    glucose, 16 g, Oral, PRN    glucose, 24 g, Oral, PRN    heparin, porcine (PF), 5 mL, Intra-Catheter, PRN    melatonin, 6 mg, Oral, Nightly PRN    naloxone, 0.02 mg, Intravenous, PRN    ondansetron, 4 mg, Intravenous, Q4H PRN    prochlorperazine, 5 mg, Intravenous, Q6H PRN    senna-docusate 8.6-50 mg, 1 tablet, Oral, BID PRN    sodium chloride 0.9%, 10 mL, Intravenous, PRN    Recent Labs   Lab 11/30/24  1359 11/30/24  2007 11/30/24  2134 11/30/24  2352 12/01/24  0758 12/02/24  0338 12/03/24  0329 12/04/24  0345 12/05/24  0330   * 130*  --   --  127* 134* 145 140 140   K 5.2* 6.2*  --  3.5 3.2* 3.2* 4.4 4.0 4.4   CALCIUM 8.1* 8.4*  --   --  7.6* 7.3* 7.6* 8.1* 8.7*   PHOS  --   --   --   --   --  5.5* 2.8 1.6* 2.0*   MG 2.20 2.20  --   --   --  1.50* 1.10* 1.30* 1.20*   CL 87* 88*  --   --  82* 90* 102 103 108*   CO2 9* 9*  --   --  16* 28 31 28 22*   .0* 237.7*  --   --  219.4* 121.2* 76.4* 28.1* 12.3   CREATININE 16.67* 14.79*  --   --  12.99* 5.78* 2.46* 1.08 1.06   EGFRNORACEVR 3 3  --   --  4 10 29 >60 >60   GLUCOSE 131* 132*  --   --  92 113 119* 102 95   BILITOT 0.5  --   --   --  0.4 0.3  --   --   --    ALKPHOS 70  --   --   --  56 50  --   --   --    ALT 10  --   --   --  9 8  --   --   --    AST 8  --   --   --  9 10  --   --   --    ALBUMIN 3.5  --   --   --  2.7* 2.6* 2.8* 2.6*  --    AMMONIA  --   --  27.7  --   --   --   --   --   --    LIPASE 161*  --   --   --   --   --   --   --   --    WBC 9.99 7.09  --   --  6.69 5.03 3.75* 3.83*  --    HGB 11.1* 9.0*  --   --  9.0* 8.8* 9.3* 9.0*  --    HCT 30.7* 24.2*  --   --  25.3* 24.0* 27.8* 27.9*  --      Nutrition Orders:  Diet Renal Non-Dialysis      Appetite/Oral Intake: good/% of meals  Factors Affecting Nutritional Intake: none identified  Food/P[employer]alism/Cultural Preferences: unable to obtain  Food Allergies: no known food allergies  Last Bowel Movement:  "24  Wound(s):  skin intact per EMR    Comments    24: Unable to speak with pt; pt eating 100% documented per EMR and eating well per MD note; noted some weight loss since September per EMR but unable to verify  unsure if dosing current weight is accurate. Noted pt with diarrhea but does have C.Diff.     24: Pt eating 100% of meals per EMR.     Anthropometrics    Height: 5' 11" (180.3 cm), Height Method: Stated  Last Weight: 97.2 kg (214 lb 4.8 oz) (24 0628), Weight Method: Standard Scale  BMI (Calculated): 29.9  BMI Classification: overweight (BMI 25-29.9)        Ideal Body Weight (IBW), Male: 172 lb                       Usual Body Weight (UBW), k kg   (220 lb wt per EMR wt from 2024)  % Usual Body Weight: 95.5  % Weight Change From Usual Weight: -4.7 %  Usual Weight Provided By: EMR weight history    Wt Readings from Last 5 Encounters:   24 97.2 kg (214 lb 4.8 oz)   24 107 kg (235 lb 14.3 oz)     Weight Change(s) Since Admission:   Wt Readings from Last 1 Encounters:   24 0628 97.2 kg (214 lb 4.8 oz)   24 1318 95.3 kg (210 lb 1.6 oz)   Admit Weight: 95.3 kg (210 lb 1.6 oz) (bed scale wt on  per RN) (24 1318), Weight Method: Bed Scale    Patient Education     Not applicable.    Nutrition Goals & Monitoring     Dietitian will monitor: energy intake and weight    Nutrition Risk/Follow-Up: low (follow-up in 5-7 days)  Patients assigned 'low nutrition risk' status do not qualify for a full nutritional assessment but will be monitored and re-evaluated in a 5-7 day time period. Please consult if re-evaluation needed sooner.   "

## 2024-12-05 NOTE — PLAN OF CARE
Problem: Adult Inpatient Plan of Care  Goal: Plan of Care Review  Outcome: Progressing  Goal: Patient-Specific Goal (Individualized)  Outcome: Progressing  Goal: Absence of Hospital-Acquired Illness or Injury  Outcome: Progressing  Goal: Optimal Comfort and Wellbeing  Outcome: Progressing  Goal: Readiness for Transition of Care  Outcome: Progressing     Problem: Suicidal Behavior  Goal: Suicidal Behavior is Absent or Managed  Outcome: Progressing     Problem: Suicide Risk  Goal: Absence of Self-Harm  Outcome: Progressing     Problem: Infection  Goal: Absence of Infection Signs and Symptoms  Outcome: Progressing     Problem: Hemodialysis  Goal: Effective Tissue Perfusion  Outcome: Progressing     Problem: Diarrhea  Goal: Effective Diarrhea Management  Outcome: Progressing     Problem: Fall Injury Risk  Goal: Absence of Fall and Fall-Related Injury  Outcome: Progressing

## 2024-12-05 NOTE — CONSULTS
Gabe Barnett 1965  [id number]  12/5/2024    CONSULTING PHYSICIAN: Albert Toledo    Reason for consult: NOE, retention     HPI: Mr. Barnett is a 60 yo male with a PMH of Alcohol abuse, Seizures, atrial fibrillation (previously on Eliquis but quit taking), HTN, and NICMO now recovered with LVEF 65-70% on echo 11/30/24 was brought to the ED by EMS after he was found in a Motel in tub covered in feces surrounded by empty alcohol bottles.  Subsequently admitted with NOE, BUN and creatinine on arrival were to 37.7 and 14.79 likely secondary to acute urinary retention, metabolic acidosis, C diff, alcohol and substance abuse major depression with suicidal ideations.  Wade catheter was placed on 12/1, his renal function improved to 28.1 and 1.08 yesterday 12/4.  Wade catheter was removed and he was able to urinate some with a PVR of greater than 800 therefore Wade was replaced. Denies known family history of urologic malignancy. Reports some incomplete emptying prior to admission, nocturia x 3. He has never seen a Urologist.      History reviewed. No pertinent past medical history.    History reviewed. No pertinent surgical history.    No family history on file.    Social History     Tobacco Use    Smoking status: Never    Smokeless tobacco: Never   Substance Use Topics    Alcohol use: Yes     Alcohol/week: 40.0 standard drinks of alcohol     Types: 40 Shots of liquor per week     Comment: more than a month ago was last drink    Drug use: Never     Current Facility-Administered Medications   Medication Dose Route Frequency Provider Last Rate Last Admin    acetaminophen tablet 1,000 mg  1,000 mg Oral Q6H PRN Gudelia Mast FNP   1,000 mg at 12/05/24 0624    aluminum-magnesium hydroxide-simethicone 200-200-20 mg/5 mL suspension 30 mL  30 mL Oral QID PRN Gudelia Mast FNP        bisacodyL suppository 10 mg  10 mg Rectal Daily PRN Gudelia Mast FNP        butalbital-acetaminophen-caffeine -40 mg per  tablet 1 tablet  1 tablet Oral Q4H PRN Albert Toledo MD   1 tablet at 12/04/24 0908    cetirizine tablet 10 mg  10 mg Oral Daily Albert Toledo MD   10 mg at 12/04/24 0903    chlordiazepoxide capsule 25 mg  25 mg Oral BID Albert Toledo MD   25 mg at 12/04/24 2017    cholestyramine-aspartame 4 gram packet 4 grams of anhydrous cholestyramine  4 grams of anhydrous cholestyramine Oral BID Albert Toledo MD   4 grams of anhydrous cholestyramine at 12/04/24 2017    dextrose 10% bolus 125 mL 125 mL  12.5 g Intravenous PRN Gudelia Mast FNP        dextrose 10% bolus 250 mL 250 mL  25 g Intravenous PRN Gudelia Mast FNP        fluticasone propionate 50 mcg/actuation nasal spray 100 mcg  2 spray Each Nostril BID Albert Toledo MD   100 mcg at 12/04/24 2018    folic acid tablet 1 mg  1 mg Oral Daily Gudelia Mast FNP   1 mg at 12/04/24 0903    glucagon (human recombinant) injection 1 mg  1 mg Intramuscular PRN Gudelia Mast FNP        glucose chewable tablet 16 g  16 g Oral PRN Gudelia Mast FNP        glucose chewable tablet 24 g  24 g Oral PRN Gudelia Mast FNP        heparin (porcine) injection 5,000 Units  5,000 Units Subcutaneous Q12H Albert Toledo MD   5,000 Units at 12/04/24 2018    heparin, porcine (PF) 100 unit/mL injection flush 500 Units  5 mL Intra-Catheter PRN Gabe Puentes MD        melatonin tablet 6 mg  6 mg Oral Nightly PRN Gudelia Mast FNP        multivitamin tablet  1 tablet Oral Daily Gudelia Mast FNP   1 tablet at 12/04/24 0903    mupirocin 2 % ointment   Nasal BID Linus Ivy MD   Given at 12/04/24 2017    naloxone 0.4 mg/mL injection 0.02 mg  0.02 mg Intravenous PRN Gudelia Mast FNP        ondansetron injection 4 mg  4 mg Intravenous Q4H PRN Gudelia Mast FNP        prochlorperazine injection Soln 5 mg  5 mg Intravenous Q6H PRN Gudelia Mast, SCOT        QUEtiapine tablet 50 mg  50 mg Oral QHS Aaron Kay, ZULAYP    50 mg at 12/04/24 2017    senna-docusate 8.6-50 mg per tablet 1 tablet  1 tablet Oral BID PRN Gudelia Mast FNP        sertraline tablet 100 mg  100 mg Oral Daily Yakov Back, BRIAN        sodium chloride 0.9% flush 10 mL  10 mL Intravenous PRN Gudelia Mast FNP        tamsulosin 24 hr capsule 0.4 mg  0.4 mg Oral Daily Albert Toledo MD        thiamine tablet 100 mg  100 mg Oral BID Albert Toledo MD   100 mg at 12/04/24 2017    vancomycin capsule 125 mg  125 mg Oral Q6H Albert Toledo MD   125 mg at 12/05/24 0617     Review of patient's allergies indicates:   Allergen Reactions    Niacin Other (See Comments)     I get flushed and burn up     ROS: 12 point review of systems negative other than the HPI    PHYSICAL EXAM:  Vitals:    12/04/24 2300 12/05/24 0300 12/05/24 0500 12/05/24 0732   BP: 132/72 118/78 117/78 (!) 133/57   BP Location: Right arm Right arm Right arm    Patient Position: Lying Lying Lying    Pulse: 88 105 98 96   Resp: 18 (!) 21 19 20   Temp: 98.5 °F (36.9 °C) 97.5 °F (36.4 °C) 98.1 °F (36.7 °C) 98.5 °F (36.9 °C)   TempSrc: Oral Oral Oral Oral   SpO2: 97% 97% 95% 95%   Weight:       Height:           Intake/Output Summary (Last 24 hours) at 12/5/2024 0832  Last data filed at 12/5/2024 0644  Gross per 24 hour   Intake 2704.55 ml   Output 3200 ml   Net -495.45 ml       GEN: WN/WD NAD  HEENT: NCAT, PERRLA, EOMI, OP clear, nares patent  CV: RRR  RESP: Even and unlabored  ABD:  Soft, nontender, nondistended  :  Rachel yellow urine draining to  bag  EXT: no C/C/E  NEURO: no focal deficits, MAEW, AAOx4      LABS:  Recent Results (from the past 24 hours)   POCT glucose    Collection Time: 12/04/24  9:51 PM   Result Value Ref Range    POCT Glucose 97 70 - 110 mg/dL   Basic Metabolic Panel    Collection Time: 12/05/24  3:30 AM   Result Value Ref Range    Sodium 140 136 - 145 mmol/L    Potassium 4.4 3.5 - 5.1 mmol/L    Chloride 108 (H) 98 - 107 mmol/L    CO2 22 (L) 23 - 31 mmol/L     Glucose 95 82 - 115 mg/dL    Blood Urea Nitrogen 12.3 8.4 - 25.7 mg/dL    Creatinine 1.06 0.72 - 1.25 mg/dL    BUN/Creatinine Ratio 12     Calcium 8.7 (L) 8.8 - 10.0 mg/dL    Anion Gap 10.0 mEq/L    eGFR >60 mL/min/1.73/m2   Magnesium    Collection Time: 12/05/24  3:30 AM   Result Value Ref Range    Magnesium Level 1.20 (L) 1.60 - 2.60 mg/dL   Phosphorus    Collection Time: 12/05/24  3:30 AM   Result Value Ref Range    Phosphorus Level 2.0 (L) 2.3 - 4.7 mg/dL   POCT glucose    Collection Time: 12/05/24  5:29 AM   Result Value Ref Range    POCT Glucose 78 70 - 110 mg/dL         IMAGING:  EXAMINATION:  US RETROPERITONEAL COMPLETE    CLINICAL HISTORY:  acute renal failure;    TECHNIQUE:  Ultrasound evaluation of the kidneys, region of the ureters, and urinary bladder.    COMPARISON:  CT 11/30/2024    FINDINGS:  No hydronephrosis. Normal renal echogenicity.    Calculated bladder volume 236 mL.    Poor visualization of the IVC.  Abdominal aorta obscured.    Measurements:    - Right kidney: 11.0 cm in length    - Left kidney: 12.4 cm in length   Impression:       No hydronephrosis.             EXAMINATION:  CT ABDOMEN PELVIS WITHOUT CONTRAST    CLINICAL HISTORY:  Abdominal pain, acute, nonlocalized;    TECHNIQUE:  CT imaging of the abdomen and pelvis without intravenous contrast. Axial, coronal and sagittal reformatted images reviewed. Dose length product is 695 mGycm. Automatic exposure control, adjustment of mA/kV or iterative reconstruction technique used to limit radiation dose.    COMPARISON:  No relevant comparison studies available at the time of dictation.    FINDINGS:  Assessment of the visceral organs and vasculature is limited by the lack of IV contrast.    Liver/biliary: No concerning hepatic findings. No radiodense gallstone or biliary dilatation appreciated.    Pancreas: Normal.    Spleen: Normal.    Adrenals: Normal.    Genitourinary: No hydronephrosis or defined urinary stone. Bladder within normal  limits.    Stomach/bowel: No bowel obstruction. Normal appendix. No discernible bowel inflammation.    Lymph nodes and peritoneum: No pathologically enlarged lymph node identified with noncontrast technique. No ascites or free air.    Vasculature: Aortoiliac atherosclerosis.    Abdominal wall: Normal.    Lung bases: Patchy ground-glass in both lung bases.    Bones: No acute osseous findings.   Impression:       No acute process identified in the abdomen or pelvis.    Patchy ground-glass in the lung bases suggestive of infectious or inflammatory process.       ASSESSMENT:  60 yo male currently under [facility] for suicidal ideation with alcohol and substance abuse.  He was admitted with acute kidney injury secondary to acute urinary retention.  His kidney function recovered with placement of Wade catheter however, he did fail a voiding trial yesterday.  He is currently on Flomax and his Wade catheter was replaced on 12/04.    PLAN:  Recommend leaving Wade catheter in place.  PT was consulted for mobilization.  Continue Flomax.  We will follow to recommend timing for voiding trial.    SCOT Simon

## 2024-12-05 NOTE — PLAN OF CARE
Chart review done- [facility] NP psych indicates on note 12/4 that pt does not have active suicidal ideations but pt indicates would probably harm himself post d/c.  Continues to recommend inpt psych treatment.  Nurse Almanza reports continues treatment for C-Diff and number of stools has decreased and stool consistency has also improved.  Will await MD rounds and referrals to psych facilities will be made once MD notes indicate Medically stable for transfer.  CM to follow    Called [facility] ph# [phone]- was informed by intake personnel that [facility] states it requires 2 negative stools and no diarrhea at all.  I updated Dr. Tristan on the policy of [facility] for inpt placement and Dr. Tristan states that here we do not repeat stools.  I called [facility] again ph# [phone] to speak with [facility] clinician re: Dr. Tristan's update- had to leave a message for [facility].  Will await return call then update nurse Almanza and Dr. Tristan.    Spoke to Cherie TORRES about [facility]'s policy re: repeat stool cultures for C-diff and Dr. Tristan's statement that stool cultures are not repeated once tx started.  Cherie TORRES states she will speak to the Director of Intake to assist with determination so referral and placement obtained when pt medically stable.  Cherie TORRES will update.

## 2024-12-05 NOTE — PT/OT/SLP EVAL
Physical Therapy Evaluation and Discharge Note    Patient Name:  Gabe Barnett   MRN:  [id number]    Recommendations:     Discharge therapy intensity: No Therapy Indicated   Discharge Equipment Recommendations: none   Barriers to discharge: None    Assessment:     Gabe Barnett is a 59 y.o. male admitted with a medical diagnosis of gastroenteritis. .  At this time, patient is functioning at their prior level of function and does not require further acute PT services.     Recent Surgery: * No surgery found *      Plan:     During this hospitalization, patient does not require further acute PT services.  Please re-consult if situation changes.      Subjective     Chief Complaint: none  Patient/Family Comments/goals: none  Pain/Comfort:  Pain Rating 1: 0/10    Patients cultural, spiritual, Mormonism conflicts given the current situation: no    Living Environment:  Lives in a motel.  Prior to admission, patients level of function was independent.  Equipment used at home: none.  DME owned (not currently used): none.  Upon discharge, patient will have assistance from no one.    Objective:     Communicated with nurse prior to session.  Patient found supine with telemetry upon PT entry to room.    General Precautions: Standard, fall , contact  Orthopedic Precautions:N/A   Braces: N/A  Respiratory Status: Room air    Exams:  Cognitive Exam:  Patient is oriented to Person, Place, Time, and Situation  Sensation: -       Intact  RLE ROM: WFL  RLE Strength: WFL  LLE ROM: WFL  LLE Strength: WFL    Functional Mobility:  Bed Mobility:  Supine to Sit: independence  Sit to Supine: independence  Transfers:  Sit to Stand:  independence with no AD  Gait: 30 ft independently  Balance: good    AM-PAC 6 CLICK MOBILITY  Total Score:24     Education Provided:  Role and goals of PT, transfer training, bed mobility, gait training, balance training, safety awareness, assistive device, strengthening exercises, and importance of participating in PT  to return to PLOF.    Patient left supine with all lines intact and call button in reach.    GOALS:   Multidisciplinary Problems       Physical Therapy Goals       Not on file                    History:     History reviewed. No pertinent past medical history.    History reviewed. No pertinent surgical history.    Time Tracking:     PT Received On: 12/05/24  PT Start Time: 1210     PT Stop Time: 1220  PT Total Time (min): 10 min     Billable Minutes: Evaluation 10 minutes      12/05/2024

## 2024-12-05 NOTE — PROGRESS NOTES
"[facility] Hospital Medicine Progress Note        Chief Complaint: Inpatient Follow-up       HPI per admittin yr old male with PMHx of Alcohol abuse, Seizures (I would assume it was alcohol withdrawal seizure), atrial fibrillation (previously on Eliquis but quit taking), HTN, and NICMO now recovered with LVEF 65-70% on echo 24 was brought to the ED by  EMS after he was found in a Motel in tub covered in feces surrounded by empty alcohol bottles. Patient reports he quit drinking one month ago. In the ED he was tremulous and agitated on arrival that had  improved with Serax. Has been living in a motel. Reports he got in the tub with intention of breaking one of the alcohol bottles and "cutting my vein". Admits to feeling very depressed. Has not taken any of his medications in several months. Reports several month history of vomiting and watery diarrhea. Unsure if he's noticed any blood in emesis or stool.   VS on arrival: T 95.7, P 98, R 18, BP 96/46, Sats 98% on room air. Initial labs: WBC 9.99, Hgb 11.1, Plt 246, Na 133, K 5.2, Cl 87, bicarb 9, , creatinine 16.67, glucose 131, AST 8, ALT 10, lipase 161. Lactic acid 2.2, CPK, ETOH, TSH, ammonia, Troponin normal.  CT abdomen and pelvis shows patchy ground-glass in the lung bases suggestive of infectious or inflammatory process.  No acute process identified in the abdomen or pelvis. Retroperitoneal ultrasound unremarkable. Pt was admitted to  service . Nephrology was consulted to assist. Pt was placed under PEC due to suicidal ideation and attempt. Psych consulted.   Pt was started on Calcium gluconate, D10 with IV insulin and Lokelma per hyperkalemia protocol. Aggressive IV hydration with bicarb drip, high dose thiamine, Librium taper, folic acid and multivitamin. Blood cultures were drawn and Zosyn initiated for presumed aspiration pneumonia. Stool was collected for C.diff toxins and came back positive. Initiated on " oral Vancomycin. Pt remained with profound metabolic acidosis with large AG and azotemia. Nephrology consulted General Surgery for temporary HD catheter and Hemodialysis initiated. Pt underwent single session dialysis on 12/1/24. UOP increased. No further dialysis needed. Renal function cont to recover and normalized as of 12/4/24. Pt now became more awake, alert and interactive but with flat affect. Diarrhea persisted. Questran added to mitigate stool frequency. Diarrhea improving. Wade catheter removed on 12/3/24. However unable to void spontaneously with >800 ml retention. Wade reinserted 12/4/24. Flomax added. Urology consult requested. PEC continued. Psych initiated Sertraline and Seroquel and inpatient psychiatric hospitalization when medically stable.      Interval Hx:   Patient is laying in bed, has a very flat affect.  Discussed plan for inpatient psych for depression treatment and medication adjustment.  Patient is in agreement  Also discussed possible discharge with Wade given urinary retention.  Encourage patient to ambulate in the room as he can not come out of the room due to pec as well as C diff infection   Sitter is at bedside   Case was discussed with patient's nurse and  on the floor.    Objective/physical exam:  General: In no acute distress, afebrile, on RA  Chest: Clear to auscultation bilaterally  Heart: RRR, +S1, S2, no appreciable murmur  Abdomen: Soft, nontender, BS +  MSK: Warm, no lower extremity edema, no clubbing or cyanosis  Neurologic: Alert and oriented, flat affect    VITAL SIGNS: 24 HRS MIN & MAX LAST   Temp  Min: 97.5 °F (36.4 °C)  Max: 98.7 °F (37.1 °C) 97.9 °F (36.6 °C)   BP  Min: 117/78  Max: 134/80 134/80   Pulse  Min: 84  Max: 105  87   Resp  Min: 18  Max: 21 20   SpO2  Min: 95 %  Max: 99 % 98 %     I have reviewed the following labs:  Recent Labs   Lab 12/02/24  0338 12/03/24  0329 12/04/24  0345   WBC 5.03 3.75* 3.83*   RBC 2.92* 3.14* 3.03*   HGB 8.8* 9.3*  9.0*   HCT 24.0* 27.8* 27.9*   MCV 82.2 88.5 92.1   MCH 30.1 29.6 29.7   MCHC 36.7* 33.5 32.3*   RDW 13.3 14.2 14.4    212 203   MPV 9.8 10.1 10.0     Recent Labs   Lab 11/30/24  1359 11/30/24 2007 12/01/24 0758 12/02/24 0338 12/03/24 0329 12/04/24 0345 12/05/24  0330   *   < > 127* 134* 145 140 140   K 5.2*   < > 3.2* 3.2* 4.4 4.0 4.4   CL 87*   < > 82* 90* 102 103 108*   CO2 9*   < > 16* 28 31 28 22*   .0*   < > 219.4* 121.2* 76.4* 28.1* 12.3   CREATININE 16.67*   < > 12.99* 5.78* 2.46* 1.08 1.06   CALCIUM 8.1*   < > 7.6* 7.3* 7.6* 8.1* 8.7*   MG 2.20   < >  --  1.50* 1.10* 1.30* 1.20*   ALBUMIN 3.5  --  2.7* 2.6* 2.8* 2.6*  --    ALKPHOS 70  --  56 50  --   --   --    ALT 10  --  9 8  --   --   --    AST 8  --  9 10  --   --   --    BILITOT 0.5  --  0.4 0.3  --   --   --     < > = values in this interval not displayed.     Microbiology Results (last 7 days)       Procedure Component Value Units Date/Time    Blood Culture [[id number]]  (Normal) Collected: 11/30/24 2007    Order Status: Completed Specimen: Blood, Venous Updated: 12/04/24 2100     Blood Culture No Growth At 96 Hours    Blood Culture [[id number]]  (Normal) Collected: 11/30/24 2007    Order Status: Completed Specimen: Blood, Venous Updated: 12/04/24 2100     Blood Culture No Growth At 96 Hours    Clostridium Diff Toxin, A & B, EIA [[id number]]  (Abnormal) Collected: 11/30/24 2331    Order Status: Completed Specimen: Stool Updated: 12/01/24 0885     Clostridium Difficile GDH Antigen Positive     Clostridium Difficile Toxin A/B Positive             See below for Radiology    Assessment/Plan:  Acute kidney injury with profound azotemia- POA- Resolved with 1 session of hemodialysis  Hyperkalemia due to above- POA- resolved   Acute urinary retention - 12/4/24- s/p re insertion of Wade catheter   Large AG profound metabolic acidosis, POA- resolved   C.diff infection and diarrhea- POA  Upper respiratory symptoms with sinus  congestion and sinus pressure- POA- improving  Suspected Aspiration Pneumonia- POA- completed antibiotic with Zosyn  Hyponatremia - POA- resolved   Hypotension due to severe volume depletion, POA- resolved   Alcohol abuse and dependency - now with refeeding syndrome  Substance abuse  Mood disorder, Major depression and Suicidal ideation- POA  PEC/ CEC status - POA  Hx- PAF, HTN, NICMO       S/P single session hemodialysis on  12/1/24 to correct profound azotemia and severe AG metabolic acidosis.   Renal parameter currently normalized   IVF discontinued as of 12/4/24  Librium taper and CIWA scale for alcohol withdrawal   Parenteral Thiamine 500 mg IV tid x 48h then transitioned to oral   Mag 1.2, ordered Mag sulfate 2 g IV q.3 x2 doses  Phos 2, ordered Neutra-Phos 1 packet t.i.d. with meals  Keep Mag greater than 2 and phos greater than 3, potassium greater than 4  Continue other supportive treatment   Zosyn for presumed aspiration pneumonitis - completed   Antihistamines and decongestant for sinus symptoms   Oral vancomycin for C.diff infection and diarrhea- day 5 of 10  Questran added, stools started to form  Wade catheter re inserted today 12/4/24 for urinary retention. Flomax added    Urology on board, recommended increased ambulation, possible discharge with Wade with outpatient follow up for voiding trial  PEC/ CEC continued   Psych initiated Sertraline and Seroquel, suggested inpatient psychiatric hospitalization when medically stable.   CM on board  Morning CBC, BMP, Mag, phos ordered        VTE prophylaxis: Heparin s/c     Patient condition:  Fair     Anticipated discharge and Disposition:   Inpatient Psych placement once accepted      All diagnosis and differential diagnosis have been reviewed; assessment and plan has been documented; I have personally reviewed the labs and test results that are presently available; I have reviewed the patients medication list; I have reviewed the consulting providers  response and recommendations. I have reviewed or attempted to review medical records based upon their availability    All of the patient's questions have been  addressed and answered. Patient's is agreeable to the above stated plan. I will continue to monitor closely and make adjustments to medical management as needed.    Portions of this note dictated using EMR integrated voice recognition software, and may be subject to voice recognition errors not corrected at proofreading. Please contact writer for clarification if needed.   _____________________________________________________________________    Malnutrition Status:  Nutrition consulted. Most recent weight and BMI monitored-     Measurements:  Wt Readings from Last 1 Encounters:   12/03/24 97.2 kg (214 lb 4.8 oz)   Body mass index is 29.89 kg/m².    Patient has been screened and assessed by RD.    Malnutrition Type:  Context:    Level:      Malnutrition Characteristic Summary:       Interventions/Recommendations (treatment strategy):        Scheduled Med:   cetirizine  10 mg Oral Daily    chlordiazepoxide  25 mg Oral BID    cholestyramine-aspartame  4 grams of anhydrous cholestyramine Oral BID    fluticasone propionate  2 spray Each Nostril BID    folic acid  1 mg Oral Daily    heparin (porcine)  5,000 Units Subcutaneous Q12H    magnesium sulfate IVPB  2 g Intravenous Q3H    multivitamin  1 tablet Oral Daily    mupirocin   Nasal BID    [START ON 12/6/2024] potassium, sodium phosphates  1 packet Oral TID WM    QUEtiapine  50 mg Oral QHS    sertraline  100 mg Oral Daily    tamsulosin  0.4 mg Oral Daily    thiamine  100 mg Oral BID    vancomycin  125 mg Oral Q6H      Continuous Infusions:     PRN Meds:  Current Facility-Administered Medications:     acetaminophen, 1,000 mg, Oral, Q6H PRN    aluminum-magnesium hydroxide-simethicone, 30 mL, Oral, QID PRN    bisacodyL, 10 mg, Rectal, Daily PRN    butalbital-acetaminophen-caffeine -40 mg, 1 tablet, Oral, Q4H  PRN    dextrose 10%, 12.5 g, Intravenous, PRN    dextrose 10%, 25 g, Intravenous, PRN    glucagon (human recombinant), 1 mg, Intramuscular, PRN    glucose, 16 g, Oral, PRN    glucose, 24 g, Oral, PRN    heparin, porcine (PF), 5 mL, Intra-Catheter, PRN    melatonin, 6 mg, Oral, Nightly PRN    naloxone, 0.02 mg, Intravenous, PRN    ondansetron, 4 mg, Intravenous, Q4H PRN    prochlorperazine, 5 mg, Intravenous, Q6H PRN    sodium chloride 0.9%, 10 mL, Intravenous, PRN         Luis Tristan MD  Department of Hospital Medicine   [facility]   12/05/2024

## 2024-12-05 NOTE — PLAN OF CARE
Problem: Adult Inpatient Plan of Care  Goal: Plan of Care Review  12/4/2024 2222 by Allan Guzman RN  Outcome: Progressing  12/4/2024 2221 by Allan Guzman RN  Outcome: Progressing  Goal: Patient-Specific Goal (Individualized)  12/4/2024 2222 by Allan Guzman RN  Outcome: Progressing  12/4/2024 2221 by Allan Guzman RN  Outcome: Progressing  Goal: Absence of Hospital-Acquired Illness or Injury  12/4/2024 2222 by Allan Guzman RN  Outcome: Progressing  12/4/2024 2221 by Allan Guzman RN  Outcome: Progressing  Goal: Optimal Comfort and Wellbeing  12/4/2024 2222 by Allan Guzman RN  Outcome: Progressing  12/4/2024 2221 by Allan Guzman RN  Outcome: Progressing  Goal: Readiness for Transition of Care  12/4/2024 2222 by Allan Guzman RN  Outcome: Progressing  12/4/2024 2221 by Allan Guzman RN  Outcome: Progressing     Problem: Suicidal Behavior  Goal: Suicidal Behavior is Absent or Managed  12/4/2024 2222 by Allan Guzman RN  Outcome: Progressing  12/4/2024 2221 by Allan Guzman RN  Outcome: Progressing     Problem: Suicide Risk  Goal: Absence of Self-Harm  12/4/2024 2222 by Allan Guzman RN  Outcome: Progressing  12/4/2024 2221 by Allan Guzman RN  Outcome: Progressing     Problem: Infection  Goal: Absence of Infection Signs and Symptoms  12/4/2024 2222 by Allan Guzman RN  Outcome: Progressing  12/4/2024 2221 by Allan Guzman RN  Outcome: Progressing     Problem: Hemodialysis  Goal: Effective Tissue Perfusion  12/4/2024 2222 by Allan Guzman RN  Outcome: Progressing  12/4/2024 2221 by Allan Guzman RN  Outcome: Progressing     Problem: Diarrhea  Goal: Effective Diarrhea Management  12/4/2024 2222 by Allan Guzman RN  Outcome: Progressing  12/4/2024 2221 by Allan Guzman, RN  Outcome: Progressing      Problem: Fall Injury Risk  Goal: Absence of Fall and Fall-Related Injury  12/4/2024 2222 by Allan Guzman, RN  Outcome: Progressing  12/4/2024 2221 by Allan Guzman, RN  Outcome: Progressing

## 2024-12-05 NOTE — PROGRESS NOTES
"12/5/2024  Gabe Barnett   1965   [id number]        Psychiatry Progress Note       SUBJECTIVE:   Gabe Barnett is a 59-year-old male admitted under PEC at [facility] with a history of alcohol abuse, seizures, atrial fibrillation (previously on Eliquis, discontinued), hypertension, and non-ischemic cardiomyopathy (NICMO). He was brought to the ED by EMS after being found in a motel bathtub covered in feces and surrounded by empty alcohol bottles. The patient reports quitting alcohol one month ago but admits to a history of heavy drinking. Upon arrival at the ED, he was tremulous and agitated, which improved with Serax administration. He revealed that he entered the bathtub with the intention of breaking an alcohol bottle to "cut my vein," indicating recent suicidal ideation. He endorses feeling very depressed and has not taken any prescribed medications in several months. The patient also reports a history of vomiting and watery diarrhea over the past several months, though he is unsure if there has been any blood in his emesis or stool. He has been living in a hotel and experiencing significant functional and emotional decline     Seen over telemedicine with sitter at the bedside. Where he is resting in bed and in no apparent distress. More alert today. Endorses depressed mood with feelings of hopelessness and helplessness. Reports suicidal ideations without a plan. No evidence of psychosis. Goal-directed thought process without delusional ideations. Denies homicidal ideations, auditory/visual hallucinations, or paranoia.       Current Medications:   Scheduled Meds:    cetirizine  10 mg Oral Daily    chlordiazepoxide  25 mg Oral BID    cholestyramine-aspartame  4 grams of anhydrous cholestyramine Oral BID    fluticasone propionate  2 spray Each Nostril BID    folic acid  1 mg Oral Daily    heparin (porcine)  5,000 Units Subcutaneous Q12H    multivitamin  1 tablet Oral Daily    mupirocin   Nasal BID    QUEtiapine  50 mg " Oral QHS    sertraline  100 mg Oral Daily    tamsulosin  0.4 mg Oral Daily    thiamine  100 mg Oral BID    vancomycin  125 mg Oral Q6H      PRN Meds:   Current Facility-Administered Medications:     acetaminophen, 1,000 mg, Oral, Q6H PRN    aluminum-magnesium hydroxide-simethicone, 30 mL, Oral, QID PRN    bisacodyL, 10 mg, Rectal, Daily PRN    butalbital-acetaminophen-caffeine -40 mg, 1 tablet, Oral, Q4H PRN    dextrose 10%, 12.5 g, Intravenous, PRN    dextrose 10%, 25 g, Intravenous, PRN    glucagon (human recombinant), 1 mg, Intramuscular, PRN    glucose, 16 g, Oral, PRN    glucose, 24 g, Oral, PRN    heparin, porcine (PF), 5 mL, Intra-Catheter, PRN    melatonin, 6 mg, Oral, Nightly PRN    naloxone, 0.02 mg, Intravenous, PRN    ondansetron, 4 mg, Intravenous, Q4H PRN    prochlorperazine, 5 mg, Intravenous, Q6H PRN    senna-docusate 8.6-50 mg, 1 tablet, Oral, BID PRN    sodium chloride 0.9%, 10 mL, Intravenous, PRN   Psychotherapeutics (From admission, onward)      Start     Stop Route Frequency Ordered    12/05/24 0900  sertraline tablet 100 mg         -- Oral Daily 12/04/24 1123    12/03/24 2100  chlordiazepoxide capsule 25 mg         -- Oral 2 times daily 12/03/24 0956    12/01/24 2100  QUEtiapine tablet 50 mg         -- Oral Nightly 12/01/24 1227            Allergies:   Review of patient's allergies indicates:   Allergen Reactions    Niacin Other (See Comments)     I get flushed and burn up        OBJECTIVE:   Vitals   Vitals:    12/05/24 1105   BP: 129/84   Pulse: 90   Resp:    Temp: 98.7 °F (37.1 °C)        Labs/Imaging/Studies:   Recent Results (from the past 36 hours)   Renal Function Panel    Collection Time: 12/04/24  3:45 AM   Result Value Ref Range    Sodium 140 136 - 145 mmol/L    Potassium 4.0 3.5 - 5.1 mmol/L    Chloride 103 98 - 107 mmol/L    CO2 28 23 - 31 mmol/L    Glucose 102 82 - 115 mg/dL    Blood Urea Nitrogen 28.1 (H) 8.4 - 25.7 mg/dL    Creatinine 1.08 0.72 - 1.25 mg/dL    Calcium 8.1  (L) 8.8 - 10.0 mg/dL    Albumin 2.6 (L) 3.4 - 4.8 g/dL    Phosphorus Level 1.6 (L) 2.3 - 4.7 mg/dL    eGFR >60 mL/min/1.73/m2   Magnesium    Collection Time: 12/04/24  3:45 AM   Result Value Ref Range    Magnesium Level 1.30 (L) 1.60 - 2.60 mg/dL   CBC with Differential    Collection Time: 12/04/24  3:45 AM   Result Value Ref Range    WBC 3.83 (L) 4.50 - 11.50 x10(3)/mcL    RBC 3.03 (L) 4.70 - 6.10 x10(6)/mcL    Hgb 9.0 (L) 14.0 - 18.0 g/dL    Hct 27.9 (L) 42.0 - 52.0 %    MCV 92.1 80.0 - 94.0 fL    MCH 29.7 27.0 - 31.0 pg    MCHC 32.3 (L) 33.0 - 36.0 g/dL    RDW 14.4 11.5 - 17.0 %    Platelet 203 130 - 400 x10(3)/mcL    MPV 10.0 7.4 - 10.4 fL    Neut % 49.6 %    Lymph % 33.9 %    Mono % 6.5 %    Eos % 8.9 %    Basophil % 0.8 %    Lymph # 1.30 0.6 - 4.6 x10(3)/mcL    Neut # 1.90 (L) 2.1 - 9.2 x10(3)/mcL    Mono # 0.25 0.1 - 1.3 x10(3)/mcL    Eos # 0.34 0 - 0.9 x10(3)/mcL    Baso # 0.03 <=0.2 x10(3)/mcL    IG# 0.01 0 - 0.04 x10(3)/mcL    IG% 0.3 %    NRBC% 0.0 %   POCT glucose    Collection Time: 12/04/24  9:51 PM   Result Value Ref Range    POCT Glucose 97 70 - 110 mg/dL   Basic Metabolic Panel    Collection Time: 12/05/24  3:30 AM   Result Value Ref Range    Sodium 140 136 - 145 mmol/L    Potassium 4.4 3.5 - 5.1 mmol/L    Chloride 108 (H) 98 - 107 mmol/L    CO2 22 (L) 23 - 31 mmol/L    Glucose 95 82 - 115 mg/dL    Blood Urea Nitrogen 12.3 8.4 - 25.7 mg/dL    Creatinine 1.06 0.72 - 1.25 mg/dL    BUN/Creatinine Ratio 12     Calcium 8.7 (L) 8.8 - 10.0 mg/dL    Anion Gap 10.0 mEq/L    eGFR >60 mL/min/1.73/m2   Magnesium    Collection Time: 12/05/24  3:30 AM   Result Value Ref Range    Magnesium Level 1.20 (L) 1.60 - 2.60 mg/dL   Phosphorus    Collection Time: 12/05/24  3:30 AM   Result Value Ref Range    Phosphorus Level 2.0 (L) 2.3 - 4.7 mg/dL   POCT glucose    Collection Time: 12/05/24  5:29 AM   Result Value Ref Range    POCT Glucose 78 70 - 110 mg/dL        Psychiatric Mental Status Exam:  General Appearance:  appears stated age, dressed in hospital garb, lying in bed, in no acute distress  Arousal: alert  Behavior: polite, under good behavioral control, poor eye contact  Movements and Motor Activity: no tics, no tremors, no akathisia, no dystonia, no evidence of tardive dyskinesia  Orientation: oriented to person, place, and time  Speech: normal rate, rhythm, volume, tone and pitch  Mood: Depressed  Affect: mood-congruent, constricted, dysthymic  Thought Process: goal-directed  Associations: no loosening of associations  Thought Content and Perceptions: + passive suicidal ideation, no homicidal ideation, no hallucinations, no delusions  Recent and Remote Memory: grossly intact; per interview/observation with patient  Attention and Concentration: grossly intact; per interview/observation with patient  Fund of Knowledge: grossly intact; based on history, vocabulary, fund of knowledge, syntax, grammar, and content  Insight: adequate; based on understanding of severity of illness and HPI  Judgment: questionable; based on patient's behavior and HPI    ASSESSMENT/PLAN:   Problems Addressed/Diagnoses:  Major Depressive Disorder, 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  Disposition  Recommend inpatient psychiatric hospitalization when medically stable.  Will continue to follow        Yakov Back

## 2024-12-06 LAB
ANION GAP SERPL CALC-SCNC: 8 MEQ/L
BUN SERPL-MCNC: 8.5 MG/DL (ref 8.4–25.7)
CALCIUM SERPL-MCNC: 8.6 MG/DL (ref 8.8–10)
CHLORIDE SERPL-SCNC: 111 MMOL/L (ref 98–107)
CO2 SERPL-SCNC: 20 MMOL/L (ref 23–31)
CREAT SERPL-MCNC: 1 MG/DL (ref 0.72–1.25)
CREAT/UREA NIT SERPL: 9
ERYTHROCYTE [DISTWIDTH] IN BLOOD BY AUTOMATED COUNT: 14.2 % (ref 11.5–17)
GFR SERPLBLD CREATININE-BSD FMLA CKD-EPI: >60 ML/MIN/1.73/M2
GLUCOSE SERPL-MCNC: 89 MG/DL (ref 82–115)
HCT VFR BLD AUTO: 28.3 % (ref 42–52)
HGB BLD-MCNC: 9.1 G/DL (ref 14–18)
MAGNESIUM SERPL-MCNC: 1.9 MG/DL (ref 1.6–2.6)
MCH RBC QN AUTO: 29.6 PG (ref 27–31)
MCHC RBC AUTO-ENTMCNC: 32.2 G/DL (ref 33–36)
MCV RBC AUTO: 92.2 FL (ref 80–94)
NRBC BLD AUTO-RTO: 0 %
PHOSPHATE SERPL-MCNC: 1.6 MG/DL (ref 2.3–4.7)
PLATELET # BLD AUTO: 186 X10(3)/MCL (ref 130–400)
PMV BLD AUTO: 9.4 FL (ref 7.4–10.4)
POTASSIUM SERPL-SCNC: 4.7 MMOL/L (ref 3.5–5.1)
RBC # BLD AUTO: 3.07 X10(6)/MCL (ref 4.7–6.1)
SODIUM SERPL-SCNC: 139 MMOL/L (ref 136–145)
WBC # BLD AUTO: 4.77 X10(3)/MCL (ref 4.5–11.5)

## 2024-12-06 PROCEDURE — 85027 COMPLETE CBC AUTOMATED: CPT | Performed by: INTERNAL MEDICINE

## 2024-12-06 PROCEDURE — 25000003 PHARM REV CODE 250: Performed by: NURSE PRACTITIONER

## 2024-12-06 PROCEDURE — 21400001 HC TELEMETRY ROOM

## 2024-12-06 PROCEDURE — 27000207 HC ISOLATION

## 2024-12-06 PROCEDURE — 63600175 PHARM REV CODE 636 W HCPCS: Performed by: INTERNAL MEDICINE

## 2024-12-06 PROCEDURE — 25000003 PHARM REV CODE 250: Performed by: INTERNAL MEDICINE

## 2024-12-06 PROCEDURE — 80048 BASIC METABOLIC PNL TOTAL CA: CPT | Performed by: INTERNAL MEDICINE

## 2024-12-06 PROCEDURE — 25000003 PHARM REV CODE 250

## 2024-12-06 PROCEDURE — 84100 ASSAY OF PHOSPHORUS: CPT | Performed by: INTERNAL MEDICINE

## 2024-12-06 PROCEDURE — 83735 ASSAY OF MAGNESIUM: CPT | Performed by: INTERNAL MEDICINE

## 2024-12-06 PROCEDURE — 36415 COLL VENOUS BLD VENIPUNCTURE: CPT | Performed by: INTERNAL MEDICINE

## 2024-12-06 RX ADMIN — THIAMINE HCL TAB 100 MG 100 MG: 100 TAB at 09:12

## 2024-12-06 RX ADMIN — QUETIAPINE FUMARATE 50 MG: 25 TABLET ORAL at 09:12

## 2024-12-06 RX ADMIN — VANCOMYCIN HYDROCHLORIDE 125 MG: 125 CAPSULE ORAL at 12:12

## 2024-12-06 RX ADMIN — FLUTICASONE PROPIONATE 100 MCG: 50 SPRAY, METERED NASAL at 09:12

## 2024-12-06 RX ADMIN — TAMSULOSIN HYDROCHLORIDE 0.4 MG: 0.4 CAPSULE ORAL at 09:12

## 2024-12-06 RX ADMIN — MUPIROCIN: 20 OINTMENT TOPICAL at 09:12

## 2024-12-06 RX ADMIN — POTASSIUM & SODIUM PHOSPHATES POWDER PACK 280-160-250 MG 1 PACKET: 280-160-250 PACK at 12:12

## 2024-12-06 RX ADMIN — VANCOMYCIN HYDROCHLORIDE 125 MG: 125 CAPSULE ORAL at 06:12

## 2024-12-06 RX ADMIN — VANCOMYCIN HYDROCHLORIDE 125 MG: 125 CAPSULE ORAL at 11:12

## 2024-12-06 RX ADMIN — CETIRIZINE HYDROCHLORIDE 10 MG: 10 TABLET, FILM COATED ORAL at 09:12

## 2024-12-06 RX ADMIN — SERTRALINE HYDROCHLORIDE 100 MG: 50 TABLET ORAL at 09:12

## 2024-12-06 RX ADMIN — HEPARIN SODIUM 5000 UNITS: 5000 INJECTION, SOLUTION INTRAVENOUS; SUBCUTANEOUS at 09:12

## 2024-12-06 RX ADMIN — THERA TABS 1 TABLET: TAB at 09:12

## 2024-12-06 RX ADMIN — POTASSIUM & SODIUM PHOSPHATES POWDER PACK 280-160-250 MG 1 PACKET: 280-160-250 PACK at 09:12

## 2024-12-06 RX ADMIN — CHLORDIAZEPOXIDE HYDROCHLORIDE 25 MG: 25 CAPSULE ORAL at 09:12

## 2024-12-06 RX ADMIN — CHOLESTYRAMINE 4 GRAMS OF ANHYDROUS CHOLESTYRAMINE: 4 POWDER, FOR SUSPENSION ORAL at 09:12

## 2024-12-06 RX ADMIN — FOLIC ACID 1 MG: 1 TABLET ORAL at 09:12

## 2024-12-06 RX ADMIN — VANCOMYCIN HYDROCHLORIDE 125 MG: 125 CAPSULE ORAL at 05:12

## 2024-12-06 RX ADMIN — POTASSIUM & SODIUM PHOSPHATES POWDER PACK 280-160-250 MG 1 PACKET: 280-160-250 PACK at 06:12

## 2024-12-06 RX ADMIN — ACETAMINOPHEN 1000 MG: 500 TABLET ORAL at 09:12

## 2024-12-06 RX ADMIN — MAGNESIUM SULFATE HEPTAHYDRATE 2 G: 40 INJECTION, SOLUTION INTRAVENOUS at 12:12

## 2024-12-06 NOTE — PROGRESS NOTES
"[facility] Medicine Progress Note        Chief Complaint: Inpatient Follow-up       HPI per admittin yr old male with PMHx of Alcohol abuse, Seizures (I would assume it was alcohol withdrawal seizure), atrial fibrillation (previously on Eliquis but quit taking), HTN, and NICMO now recovered with LVEF 65-70% on echo 24 was brought to the ED by  EMS after he was found in a Motel in tub covered in feces surrounded by empty alcohol bottles. Patient reports he quit drinking one month ago. In the ED he was tremulous and agitated on arrival that had  improved with Serax. Has been living in a motel. Reports he got in the tub with intention of breaking one of the alcohol bottles and "cutting my vein". Admits to feeling very depressed. Has not taken any of his medications in several months. Reports several month history of vomiting and watery diarrhea. Unsure if he's noticed any blood in emesis or stool.   VS on arrival: T 95.7, P 98, R 18, BP 96/46, Sats 98% on room air. Initial labs: WBC 9.99, Hgb 11.1, Plt 246, Na 133, K 5.2, Cl 87, bicarb 9, , creatinine 16.67, glucose 131, AST 8, ALT 10, lipase 161. Lactic acid 2.2, CPK, ETOH, TSH, ammonia, Troponin normal.  CT abdomen and pelvis shows patchy ground-glass in the lung bases suggestive of infectious or inflammatory process.  No acute process identified in the abdomen or pelvis. Retroperitoneal ultrasound unremarkable. Pt was admitted to  service . Nephrology was consulted to assist. Pt was placed under PEC due to suicidal ideation and attempt. Psych consulted.   Pt was started on Calcium gluconate, D10 with IV insulin and Lokelma per hyperkalemia protocol. Aggressive IV hydration with bicarb drip, high dose thiamine, Librium taper, folic acid and multivitamin. Blood cultures were drawn and Zosyn initiated for presumed aspiration pneumonia. Stool was collected for C.diff toxins and came back positive. Initiated on " oral Vancomycin. Pt remained with profound metabolic acidosis with large AG and azotemia. Nephrology consulted General Surgery for temporary HD catheter and Hemodialysis initiated. Pt underwent single session dialysis on 12/1/24. UOP increased. No further dialysis needed. Renal function cont to recover and normalized as of 12/4/24. Pt now became more awake, alert and interactive but with flat affect. Diarrhea persisted. Questran added to mitigate stool frequency. Diarrhea improving. Wade catheter removed on 12/3/24. However unable to void spontaneously with >800 ml retention. Wade reinserted 12/4/24. Flomax added. Urology consult requested. PEC continued. Psych initiated Sertraline and Seroquel and inpatient psychiatric hospitalization when medically stable.      Interval Hx:   Patient is laying in bed, has a very flat affect.  No major complaints  Encourage ambulation in the room  Sitter is at bedside   Case was discussed with patient's nurse on the floor.    Objective/physical exam:  General: In no acute distress, afebrile, on RA  Chest: Clear to auscultation bilaterally  Heart: RRR, +S1, S2, no appreciable murmur  Abdomen: Soft, nontender, BS +  MSK: Warm, no lower extremity edema, no clubbing or cyanosis  Neurologic: Alert and oriented, flat affect    VITAL SIGNS: 24 HRS MIN & MAX LAST   Temp  Min: 97.7 °F (36.5 °C)  Max: 98.7 °F (37.1 °C) 97.7 °F (36.5 °C)   BP  Min: 115/79  Max: 134/80 115/79   Pulse  Min: 81  Max: 90  81   Resp  Min: 18  Max: 20 18   SpO2  Min: 96 %  Max: 99 % 97 %     I have reviewed the following labs:  Recent Labs   Lab 12/03/24  0329 12/04/24  0345 12/06/24  0331   WBC 3.75* 3.83* 4.77   RBC 3.14* 3.03* 3.07*   HGB 9.3* 9.0* 9.1*   HCT 27.8* 27.9* 28.3*   MCV 88.5 92.1 92.2   MCH 29.6 29.7 29.6   MCHC 33.5 32.3* 32.2*   RDW 14.2 14.4 14.2    203 186   MPV 10.1 10.0 9.4     Recent Labs   Lab 11/30/24  1359 11/30/24  2007 12/01/24  0758 12/02/24  0338 12/03/24  0329 12/04/24  0345  12/05/24  0330 12/06/24  0331   *   < > 127* 134* 145 140 140 139   K 5.2*   < > 3.2* 3.2* 4.4 4.0 4.4 4.7   CL 87*   < > 82* 90* 102 103 108* 111*   CO2 9*   < > 16* 28 31 28 22* 20*   .0*   < > 219.4* 121.2* 76.4* 28.1* 12.3 8.5   CREATININE 16.67*   < > 12.99* 5.78* 2.46* 1.08 1.06 1.00   CALCIUM 8.1*   < > 7.6* 7.3* 7.6* 8.1* 8.7* 8.6*   MG 2.20   < >  --  1.50* 1.10* 1.30* 1.20* 1.90   ALBUMIN 3.5  --  2.7* 2.6* 2.8* 2.6*  --   --    ALKPHOS 70  --  56 50  --   --   --   --    ALT 10  --  9 8  --   --   --   --    AST 8  --  9 10  --   --   --   --    BILITOT 0.5  --  0.4 0.3  --   --   --   --     < > = values in this interval not displayed.     Microbiology Results (last 7 days)       Procedure Component Value Units Date/Time    Blood Culture [[id number]]  (Normal) Collected: 11/30/24 2007    Order Status: Completed Specimen: Blood, Venous Updated: 12/05/24 2100     Blood Culture No Growth at 5 days    Blood Culture [[id number]]  (Normal) Collected: 11/30/24 2007    Order Status: Completed Specimen: Blood, Venous Updated: 12/05/24 2100     Blood Culture No Growth at 5 days    Clostridium Diff Toxin, A & B, EIA [[id number]]  (Abnormal) Collected: 11/30/24 2331    Order Status: Completed Specimen: Stool Updated: 12/01/24 0840     Clostridium Difficile GDH Antigen Positive     Clostridium Difficile Toxin A/B Positive             See below for Radiology    Assessment/Plan:  Acute kidney injury with profound azotemia- POA- Resolved with 1 session of hemodialysis  Hyperkalemia due to above- POA- resolved   Acute urinary retention - 12/4/24- s/p re insertion of Wade catheter   Large AG profound metabolic acidosis, POA- resolved   C.diff infection and diarrhea- POA  Upper respiratory symptoms with sinus congestion and sinus pressure- POA- improving  Suspected Aspiration Pneumonia- POA- completed antibiotic with Zosyn  Hyponatremia - POA- resolved   Hypotension due to severe volume depletion, POA-  resolved   Alcohol abuse and dependency - now with refeeding syndrome  Substance abuse  Mood disorder, Major depression and Suicidal ideation- POA  PEC/ CEC status - POA  Hx- PAF, HTN, NICMO       S/P single session hemodialysis on  12/1/24 to correct profound azotemia and severe AG metabolic acidosis.   Renal parameter currently normalized   IVF discontinued as of 12/4/24  Librium taper and CIWA scale for alcohol withdrawal   Thiamine 100 mg b.i.d.  Mag 1.9   Phos 1.6, continue Neutra-Phos 1 packet t.i.d. with meals  Potassium 4.7  Keep Mag greater than 2 and phos greater than 3, potassium greater than 4  Continue other supportive treatment   Completed course of IV Zosyn for presumed aspiration pneumonitis  Antihistamines and decongestant for sinus symptoms   Oral vancomycin for C.diff infection and diarrhea- day 7 of 10  Questran added, stools started to form  Wade catheter re inserted 12/4/24 for urinary retention. Flomax added    Urology on board, recommended increased ambulation, possible discharge with Wade with outpatient follow up for voiding trial  PEC/ CEC continued   Psych initiated Sertraline and Seroquel, suggested inpatient psychiatric hospitalization when medically stable.   CM on board  Morning BMP, Mag ordered        VTE prophylaxis: Heparin s/c     Patient condition:  Fair     Anticipated discharge and Disposition:   Inpatient Psych placement once accepted      All diagnosis and differential diagnosis have been reviewed; assessment and plan has been documented; I have personally reviewed the labs and test results that are presently available; I have reviewed the patients medication list; I have reviewed the consulting providers response and recommendations. I have reviewed or attempted to review medical records based upon their availability    All of the patient's questions have been  addressed and answered. Patient's is agreeable to the above stated plan. I will continue to monitor closely and  make adjustments to medical management as needed.    Portions of this note dictated using EMR integrated voice recognition software, and may be subject to voice recognition errors not corrected at proofreading. Please contact writer for clarification if needed.   _____________________________________________________________________    Malnutrition Status:  Nutrition consulted. Most recent weight and BMI monitored-     Measurements:  Wt Readings from Last 1 Encounters:   12/06/24 97.5 kg (215 lb)   Body mass index is 29.99 kg/m².    Patient has been screened and assessed by RD.    Malnutrition Type:  Context:    Level:      Malnutrition Characteristic Summary:       Interventions/Recommendations (treatment strategy):        Scheduled Med:   cetirizine  10 mg Oral Daily    chlordiazepoxide  25 mg Oral BID    cholestyramine-aspartame  4 grams of anhydrous cholestyramine Oral BID    fluticasone propionate  2 spray Each Nostril BID    folic acid  1 mg Oral Daily    heparin (porcine)  5,000 Units Subcutaneous Q12H    multivitamin  1 tablet Oral Daily    mupirocin   Nasal BID    potassium, sodium phosphates  1 packet Oral TID WM    QUEtiapine  50 mg Oral QHS    sertraline  100 mg Oral Daily    tamsulosin  0.4 mg Oral Daily    thiamine  100 mg Oral BID    vancomycin  125 mg Oral Q6H      Continuous Infusions:     PRN Meds:  Current Facility-Administered Medications:     acetaminophen, 1,000 mg, Oral, Q6H PRN    aluminum-magnesium hydroxide-simethicone, 30 mL, Oral, QID PRN    bisacodyL, 10 mg, Rectal, Daily PRN    butalbital-acetaminophen-caffeine -40 mg, 1 tablet, Oral, Q4H PRN    dextrose 10%, 12.5 g, Intravenous, PRN    dextrose 10%, 25 g, Intravenous, PRN    glucagon (human recombinant), 1 mg, Intramuscular, PRN    glucose, 16 g, Oral, PRN    glucose, 24 g, Oral, PRN    heparin, porcine (PF), 5 mL, Intra-Catheter, PRN    melatonin, 6 mg, Oral, Nightly PRN    naloxone, 0.02 mg, Intravenous, PRN    ondansetron, 4  mg, Intravenous, Q4H PRN    prochlorperazine, 5 mg, Intravenous, Q6H PRN    sodium chloride 0.9%, 10 mL, Intravenous, PRN         Luis Tristan MD  Department of [facility] Medicine   [facility]   12/06/2024

## 2024-12-06 NOTE — PLAN OF CARE
Problem: Adult Inpatient Plan of Care  Goal: Plan of Care Review  Outcome: Progressing  Goal: Patient-Specific Goal (Individualized)  Outcome: Progressing  Goal: Absence of Hospital-Acquired Illness or Injury  Outcome: Progressing  Goal: Optimal Comfort and Wellbeing  Outcome: Progressing  Goal: Readiness for Transition of Care  Outcome: Progressing     Problem: Suicidal Behavior  Goal: Suicidal Behavior is Absent or Managed  Outcome: Progressing     Problem: Suicide Risk  Goal: Absence of Self-Harm  Outcome: Progressing     Problem: Infection  Goal: Absence of Infection Signs and Symptoms  Outcome: Progressing     Problem: Diarrhea  Goal: Effective Diarrhea Management  Outcome: Progressing     Problem: Fall Injury Risk  Goal: Absence of Fall and Fall-Related Injury  Outcome: Progressing

## 2024-12-06 NOTE — PROGRESS NOTES
"12/6/2024  Gabe Barnett   1965   [id number]        Psychiatry Progress Note     SUBJECTIVE:   Gabe Barnett is a 59-year-old male admitted under PEC at [facility] with a history of alcohol abuse, seizures, atrial fibrillation (previously on Eliquis, discontinued), hypertension, and non-ischemic cardiomyopathy (NICMO). He was brought to the ED by EMS after being found in a motel bathtub covered in feces and surrounded by empty alcohol bottles. The patient reports quitting alcohol one month ago but admits to a history of heavy drinking. Upon arrival at the ED, he was tremulous and agitated, which improved with Serax administration. He revealed that he entered the bathtub with the intention of breaking an alcohol bottle to "cut my vein," indicating recent suicidal ideation. He endorses feeling very depressed and has not taken any prescribed medications in several months. The patient also reports a history of vomiting and watery diarrhea over the past several months, though he is unsure if there has been any blood in his emesis or stool. He has been living in a hotel and experiencing significant functional and emotional decline.    Seen at the bedside with 1:1 sitter present. Resting quietly in bed in no apparent distress. Polite and cooperative with interview. Endorses depressed mood and displays mood-congruent and dysphoric affect. Continues to endorse suicidal ideations without a plan. Endorses feelings of hopelessness and helplessness. No evidence of psychosis. Denies homicidal ideations, auditory/visual hallucinations, or paranoia. Reports no issues with sleep.        Current Medications:   Scheduled Meds:    cetirizine  10 mg Oral Daily    chlordiazepoxide  25 mg Oral BID    cholestyramine-aspartame  4 grams of anhydrous cholestyramine Oral BID    fluticasone propionate  2 spray Each Nostril BID    folic acid  1 mg Oral Daily    heparin (porcine)  5,000 Units Subcutaneous Q12H    multivitamin  1 tablet Oral Daily    " mupirocin   Nasal BID    potassium, sodium phosphates  1 packet Oral TID WM    QUEtiapine  50 mg Oral QHS    sertraline  100 mg Oral Daily    tamsulosin  0.4 mg Oral Daily    thiamine  100 mg Oral BID    vancomycin  125 mg Oral Q6H      PRN Meds:   Current Facility-Administered Medications:     acetaminophen, 1,000 mg, Oral, Q6H PRN    aluminum-magnesium hydroxide-simethicone, 30 mL, Oral, QID PRN    bisacodyL, 10 mg, Rectal, Daily PRN    butalbital-acetaminophen-caffeine -40 mg, 1 tablet, Oral, Q4H PRN    dextrose 10%, 12.5 g, Intravenous, PRN    dextrose 10%, 25 g, Intravenous, PRN    glucagon (human recombinant), 1 mg, Intramuscular, PRN    glucose, 16 g, Oral, PRN    glucose, 24 g, Oral, PRN    heparin, porcine (PF), 5 mL, Intra-Catheter, PRN    melatonin, 6 mg, Oral, Nightly PRN    naloxone, 0.02 mg, Intravenous, PRN    ondansetron, 4 mg, Intravenous, Q4H PRN    prochlorperazine, 5 mg, Intravenous, Q6H PRN    sodium chloride 0.9%, 10 mL, Intravenous, PRN   Psychotherapeutics (From admission, onward)      Start     Stop Route Frequency Ordered    12/05/24 0900  sertraline tablet 100 mg         -- Oral Daily 12/04/24 1123    12/03/24 2100  chlordiazepoxide capsule 25 mg         -- Oral 2 times daily 12/03/24 0956    12/01/24 2100  QUEtiapine tablet 50 mg         -- Oral Nightly 12/01/24 1227            Allergies:   Review of patient's allergies indicates:   Allergen Reactions    Niacin Other (See Comments)     I get flushed and burn up        OBJECTIVE:   Vitals   Vitals:    12/06/24 1148   BP: 128/85   Pulse: 83   Resp: 20   Temp: 97.5 °F (36.4 °C)        Labs/Imaging/Studies:   Recent Results (from the past 36 hours)   Basic Metabolic Panel    Collection Time: 12/05/24  3:30 AM   Result Value Ref Range    Sodium 140 136 - 145 mmol/L    Potassium 4.4 3.5 - 5.1 mmol/L    Chloride 108 (H) 98 - 107 mmol/L    CO2 22 (L) 23 - 31 mmol/L    Glucose 95 82 - 115 mg/dL    Blood Urea Nitrogen 12.3 8.4 - 25.7 mg/dL     Creatinine 1.06 0.72 - 1.25 mg/dL    BUN/Creatinine Ratio 12     Calcium 8.7 (L) 8.8 - 10.0 mg/dL    Anion Gap 10.0 mEq/L    eGFR >60 mL/min/1.73/m2   Magnesium    Collection Time: 12/05/24  3:30 AM   Result Value Ref Range    Magnesium Level 1.20 (L) 1.60 - 2.60 mg/dL   Phosphorus    Collection Time: 12/05/24  3:30 AM   Result Value Ref Range    Phosphorus Level 2.0 (L) 2.3 - 4.7 mg/dL   POCT glucose    Collection Time: 12/05/24  5:29 AM   Result Value Ref Range    POCT Glucose 78 70 - 110 mg/dL   CBC Without Differential    Collection Time: 12/06/24  3:31 AM   Result Value Ref Range    WBC 4.77 4.50 - 11.50 x10(3)/mcL    RBC 3.07 (L) 4.70 - 6.10 x10(6)/mcL    Hgb 9.1 (L) 14.0 - 18.0 g/dL    Hct 28.3 (L) 42.0 - 52.0 %    MCV 92.2 80.0 - 94.0 fL    MCH 29.6 27.0 - 31.0 pg    MCHC 32.2 (L) 33.0 - 36.0 g/dL    RDW 14.2 11.5 - 17.0 %    Platelet 186 130 - 400 x10(3)/mcL    MPV 9.4 7.4 - 10.4 fL    NRBC% 0.0 %   Basic Metabolic Panel    Collection Time: 12/06/24  3:31 AM   Result Value Ref Range    Sodium 139 136 - 145 mmol/L    Potassium 4.7 3.5 - 5.1 mmol/L    Chloride 111 (H) 98 - 107 mmol/L    CO2 20 (L) 23 - 31 mmol/L    Glucose 89 82 - 115 mg/dL    Blood Urea Nitrogen 8.5 8.4 - 25.7 mg/dL    Creatinine 1.00 0.72 - 1.25 mg/dL    BUN/Creatinine Ratio 9     Calcium 8.6 (L) 8.8 - 10.0 mg/dL    Anion Gap 8.0 mEq/L    eGFR >60 mL/min/1.73/m2   Magnesium    Collection Time: 12/06/24  3:31 AM   Result Value Ref Range    Magnesium Level 1.90 1.60 - 2.60 mg/dL   Phosphorus    Collection Time: 12/06/24  3:31 AM   Result Value Ref Range    Phosphorus Level 1.6 (L) 2.3 - 4.7 mg/dL            Psychiatric Mental Status Exam:  General Appearance: appears stated age, dressed in hospital garb, lying in bed, in no acute distress  Arousal: alert  Behavior: polite, under good behavioral control, poor eye contact  Movements and Motor Activity: no tics, no tremors, no akathisia, no dystonia, no evidence of tardive  dyskinesia  Orientation: oriented to person, place, and time  Speech: normal rate, rhythm, volume, tone and pitch  Mood: Depressed  Affect: mood-congruent, constricted, dysthymic  Thought Process: goal-directed  Associations: no loosening of associations  Thought Content and Perceptions: + passive suicidal ideation, no homicidal ideation, no hallucinations, no delusions  Recent and Remote Memory: grossly intact; per interview/observation with patient  Attention and Concentration: grossly intact; per interview/observation with patient  Fund of Knowledge: grossly intact; based on history, vocabulary, fund of knowledge, syntax, grammar, and content  Insight: adequate; based on understanding of severity of illness and HPI  Judgment: questionable; based on patient's behavior and HPI    ASSESSMENT/PLAN:   Problems Addressed/Diagnoses:  Major Depressive Disorder, 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  Disposition  Recommend inpatient psychiatric hospitalization when medically stable.  Will continue to follow           Yakov Back

## 2024-12-06 NOTE — PLAN OF CARE
Problem: Suicidal Behavior  Goal: Suicidal Behavior is Absent or Managed  Outcome: Progressing     Problem: Suicide Risk  Goal: Absence of Self-Harm  Outcome: Progressing     Problem: Diarrhea  Goal: Effective Diarrhea Management  Outcome: Progressing

## 2024-12-07 LAB
ANION GAP SERPL CALC-SCNC: 7 MEQ/L
BUN SERPL-MCNC: 12 MG/DL (ref 8.4–25.7)
CALCIUM SERPL-MCNC: 8.5 MG/DL (ref 8.8–10)
CHLORIDE SERPL-SCNC: 113 MMOL/L (ref 98–107)
CO2 SERPL-SCNC: 19 MMOL/L (ref 23–31)
CREAT SERPL-MCNC: 1.01 MG/DL (ref 0.72–1.25)
CREAT/UREA NIT SERPL: 12
GFR SERPLBLD CREATININE-BSD FMLA CKD-EPI: >60 ML/MIN/1.73/M2
GLUCOSE SERPL-MCNC: 84 MG/DL (ref 82–115)
MAGNESIUM SERPL-MCNC: 1.4 MG/DL (ref 1.6–2.6)
POCT GLUCOSE: 110 MG/DL (ref 70–110)
POTASSIUM SERPL-SCNC: 5.4 MMOL/L (ref 3.5–5.1)
SODIUM SERPL-SCNC: 139 MMOL/L (ref 136–145)

## 2024-12-07 PROCEDURE — 63600175 PHARM REV CODE 636 W HCPCS: Performed by: INTERNAL MEDICINE

## 2024-12-07 PROCEDURE — 25000003 PHARM REV CODE 250: Performed by: INTERNAL MEDICINE

## 2024-12-07 PROCEDURE — 83735 ASSAY OF MAGNESIUM: CPT | Performed by: INTERNAL MEDICINE

## 2024-12-07 PROCEDURE — 21400001 HC TELEMETRY ROOM

## 2024-12-07 PROCEDURE — 36415 COLL VENOUS BLD VENIPUNCTURE: CPT | Performed by: INTERNAL MEDICINE

## 2024-12-07 PROCEDURE — 80048 BASIC METABOLIC PNL TOTAL CA: CPT | Performed by: INTERNAL MEDICINE

## 2024-12-07 PROCEDURE — 25000003 PHARM REV CODE 250: Performed by: NURSE PRACTITIONER

## 2024-12-07 PROCEDURE — 25000003 PHARM REV CODE 250

## 2024-12-07 PROCEDURE — 27000207 HC ISOLATION

## 2024-12-07 RX ORDER — MAGNESIUM SULFATE HEPTAHYDRATE 40 MG/ML
2 INJECTION, SOLUTION INTRAVENOUS ONCE
Status: COMPLETED | OUTPATIENT
Start: 2024-12-07 | End: 2024-12-07

## 2024-12-07 RX ORDER — LANOLIN ALCOHOL/MO/W.PET/CERES
400 CREAM (GRAM) TOPICAL DAILY
Status: DISCONTINUED | OUTPATIENT
Start: 2024-12-08 | End: 2024-12-10 | Stop reason: HOSPADM

## 2024-12-07 RX ADMIN — VANCOMYCIN HYDROCHLORIDE 125 MG: 125 CAPSULE ORAL at 11:12

## 2024-12-07 RX ADMIN — HEPARIN SODIUM 5000 UNITS: 5000 INJECTION, SOLUTION INTRAVENOUS; SUBCUTANEOUS at 08:12

## 2024-12-07 RX ADMIN — VANCOMYCIN HYDROCHLORIDE 125 MG: 125 CAPSULE ORAL at 05:12

## 2024-12-07 RX ADMIN — MUPIROCIN: 20 OINTMENT TOPICAL at 09:12

## 2024-12-07 RX ADMIN — VANCOMYCIN HYDROCHLORIDE 125 MG: 125 CAPSULE ORAL at 06:12

## 2024-12-07 RX ADMIN — VANCOMYCIN HYDROCHLORIDE 125 MG: 125 CAPSULE ORAL at 12:12

## 2024-12-07 RX ADMIN — FLUTICASONE PROPIONATE 100 MCG: 50 SPRAY, METERED NASAL at 08:12

## 2024-12-07 RX ADMIN — THERA TABS 1 TABLET: TAB at 09:12

## 2024-12-07 RX ADMIN — CHOLESTYRAMINE 4 GRAMS OF ANHYDROUS CHOLESTYRAMINE: 4 POWDER, FOR SUSPENSION ORAL at 08:12

## 2024-12-07 RX ADMIN — CHLORDIAZEPOXIDE HYDROCHLORIDE 25 MG: 25 CAPSULE ORAL at 09:12

## 2024-12-07 RX ADMIN — THIAMINE HCL TAB 100 MG 100 MG: 100 TAB at 08:12

## 2024-12-07 RX ADMIN — MAGNESIUM SULFATE HEPTAHYDRATE 2 G: 40 INJECTION, SOLUTION INTRAVENOUS at 09:12

## 2024-12-07 RX ADMIN — CHOLESTYRAMINE 4 GRAMS OF ANHYDROUS CHOLESTYRAMINE: 4 POWDER, FOR SUSPENSION ORAL at 09:12

## 2024-12-07 RX ADMIN — CHLORDIAZEPOXIDE HYDROCHLORIDE 25 MG: 25 CAPSULE ORAL at 08:12

## 2024-12-07 RX ADMIN — CETIRIZINE HYDROCHLORIDE 10 MG: 10 TABLET, FILM COATED ORAL at 09:12

## 2024-12-07 RX ADMIN — THIAMINE HCL TAB 100 MG 100 MG: 100 TAB at 09:12

## 2024-12-07 RX ADMIN — FOLIC ACID 1 MG: 1 TABLET ORAL at 09:12

## 2024-12-07 RX ADMIN — HEPARIN SODIUM 5000 UNITS: 5000 INJECTION, SOLUTION INTRAVENOUS; SUBCUTANEOUS at 09:12

## 2024-12-07 RX ADMIN — SERTRALINE HYDROCHLORIDE 100 MG: 50 TABLET ORAL at 09:12

## 2024-12-07 RX ADMIN — TAMSULOSIN HYDROCHLORIDE 0.4 MG: 0.4 CAPSULE ORAL at 09:12

## 2024-12-07 RX ADMIN — QUETIAPINE FUMARATE 50 MG: 25 TABLET ORAL at 08:12

## 2024-12-07 NOTE — PROGRESS NOTES
"[facility] Hospital Medicine Progress Note        Chief Complaint: Inpatient Follow-up       HPI per admittin yr old male with PMHx of Alcohol abuse, Seizures (I would assume it was alcohol withdrawal seizure), atrial fibrillation (previously on Eliquis but quit taking), HTN, and NICMO now recovered with LVEF 65-70% on echo 24 was brought to the ED by  EMS after he was found in a Motel in tub covered in feces surrounded by empty alcohol bottles. Patient reports he quit drinking one month ago. In the ED he was tremulous and agitated on arrival that had  improved with Serax. Has been living in a motel. Reports he got in the tub with intention of breaking one of the alcohol bottles and "cutting my vein". Admits to feeling very depressed. Has not taken any of his medications in several months. Reports several month history of vomiting and watery diarrhea. Unsure if he's noticed any blood in emesis or stool.   VS on arrival: T 95.7, P 98, R 18, BP 96/46, Sats 98% on room air. Initial labs: WBC 9.99, Hgb 11.1, Plt 246, Na 133, K 5.2, Cl 87, bicarb 9, , creatinine 16.67, glucose 131, AST 8, ALT 10, lipase 161. Lactic acid 2.2, CPK, ETOH, TSH, ammonia, Troponin normal.  CT abdomen and pelvis shows patchy ground-glass in the lung bases suggestive of infectious or inflammatory process.  No acute process identified in the abdomen or pelvis. Retroperitoneal ultrasound unremarkable. Pt was admitted to  service . Nephrology was consulted to assist. Pt was placed under PEC due to suicidal ideation and attempt. Psych consulted.   Pt was started on Calcium gluconate, D10 with IV insulin and Lokelma per hyperkalemia protocol. Aggressive IV hydration with bicarb drip, high dose thiamine, Librium taper, folic acid and multivitamin. Blood cultures were drawn and Zosyn initiated for presumed aspiration pneumonia. Stool was collected for C.diff toxins and came back positive. Initiated on " oral Vancomycin. Pt remained with profound metabolic acidosis with large AG and azotemia. Nephrology consulted General Surgery for temporary HD catheter and Hemodialysis initiated. Pt underwent single session dialysis on 12/1/24. UOP increased. No further dialysis needed. Renal function cont to recover and normalized as of 12/4/24. Pt now became more awake, alert and interactive but with flat affect. Diarrhea persisted. Questran added to mitigate stool frequency. Diarrhea improving. Wade catheter removed on 12/3/24. However unable to void spontaneously with >800 ml retention. Wade reinserted 12/4/24. Flomax added. Urology consult requested. PEC continued. Psych initiated Sertraline and Seroquel and inpatient psychiatric hospitalization when medically stable.      Interval Hx:   Patient is laying in bed, has a very flat affect.  No major complaints, reports diarrhea has resolved  Encourage ambulation in the room patient reported he does from time to time  Sitter is at bedside   Case was discussed with patient's nurse on the floor.    Objective/physical exam:  General: In no acute distress, afebrile, on RA  Chest: Clear to auscultation bilaterally  Heart: RRR, +S1, S2, no appreciable murmur  Abdomen: Soft, nontender, BS +  MSK: Warm, no lower extremity edema, no clubbing or cyanosis  Neurologic: Alert and oriented, flat affect    VITAL SIGNS: 24 HRS MIN & MAX LAST   Temp  Min: 97.8 °F (36.6 °C)  Max: 98.2 °F (36.8 °C) 97.8 °F (36.6 °C)   BP  Min: 101/69  Max: 133/84 132/89   Pulse  Min: 80  Max: 89  84   Resp  Min: 13  Max: 18 18   SpO2  Min: 96 %  Max: 98 % 98 %     I have reviewed the following labs:  Recent Labs   Lab 12/03/24  0329 12/04/24  0345 12/06/24  0331   WBC 3.75* 3.83* 4.77   RBC 3.14* 3.03* 3.07*   HGB 9.3* 9.0* 9.1*   HCT 27.8* 27.9* 28.3*   MCV 88.5 92.1 92.2   MCH 29.6 29.7 29.6   MCHC 33.5 32.3* 32.2*   RDW 14.2 14.4 14.2    203 186   MPV 10.1 10.0 9.4     Recent Labs   Lab 12/01/24  0758  12/02/24  0338 12/03/24  0329 12/04/24  0345 12/05/24  0330 12/06/24  0331 12/07/24  0323   * 134* 145 140 140 139 139   K 3.2* 3.2* 4.4 4.0 4.4 4.7 5.4*   CL 82* 90* 102 103 108* 111* 113*   CO2 16* 28 31 28 22* 20* 19*   .4* 121.2* 76.4* 28.1* 12.3 8.5 12.0   CREATININE 12.99* 5.78* 2.46* 1.08 1.06 1.00 1.01   CALCIUM 7.6* 7.3* 7.6* 8.1* 8.7* 8.6* 8.5*   MG  --  1.50* 1.10* 1.30* 1.20* 1.90 1.40*   ALBUMIN 2.7* 2.6* 2.8* 2.6*  --   --   --    ALKPHOS 56 50  --   --   --   --   --    ALT 9 8  --   --   --   --   --    AST 9 10  --   --   --   --   --    BILITOT 0.4 0.3  --   --   --   --   --      Microbiology Results (last 7 days)       Procedure Component Value Units Date/Time    Blood Culture [[id number]]  (Normal) Collected: 11/30/24 2007    Order Status: Completed Specimen: Blood, Venous Updated: 12/05/24 2100     Blood Culture No Growth at 5 days    Blood Culture [[id number]]  (Normal) Collected: 11/30/24 2007    Order Status: Completed Specimen: Blood, Venous Updated: 12/05/24 2100     Blood Culture No Growth at 5 days    Clostridium Diff Toxin, A & B, EIA [[id number]]  (Abnormal) Collected: 11/30/24 2331    Order Status: Completed Specimen: Stool Updated: 12/01/24 0840     Clostridium Difficile GDH Antigen Positive     Clostridium Difficile Toxin A/B Positive             See below for Radiology    Intake/Output:  Intake/Output Summary (Last 24 hours) at 12/7/2024 1745  Last data filed at 12/7/2024 1543  Gross per 24 hour   Intake 360 ml   Output 4475 ml   Net -4115 ml          Assessment/Plan:  Acute kidney injury with profound azotemia- POA- Resolved with 1 session of hemodialysis  Hyperkalemia due to above- POA- resolved   Acute urinary retention - 12/4/24- s/p re insertion of Wade catheter   Large AG profound metabolic acidosis, POA- resolved   C.diff infection and diarrhea- POA  Upper respiratory symptoms with sinus congestion and sinus pressure- POA- improving  Suspected Aspiration  Pneumonia- POA- completed antibiotic with Zosyn  Hyponatremia - POA- resolved   Hypotension due to severe volume depletion, POA- resolved   Alcohol abuse and dependency - now with refeeding syndrome  Substance abuse  Mood disorder, Major depression and Suicidal ideation- POA  PEC/ CEC status - POA  Hx- PAF, HTN, NICMO         Oral vancomycin for C.diff infection and diarrhea- day 7 of 10  Questran added, diarrhea has resolved  S/P single session hemodialysis on  12/1/24 to correct profound azotemia and severe AG metabolic acidosis.   Renal parameter currently normalized   IVF discontinued as of 12/4/24  Librium taper and CIWA scale for alcohol withdrawal   Thiamine 100 mg b.i.d.  Mag again down to 1.4, ordered Mag sulfate 2 g IV x1, start Mag oxide 400 mg daily  Phos 1.6 but Potassium went up with Neutraphos to 5.4,  d/c neutraphos   Keep Mag greater than 2 and phos greater than 3, potassium greater than 4  Continue other supportive treatment   Completed course of IV Zosyn for presumed aspiration pneumonitis  Antihistamines and decongestant for sinus symptoms   Wade catheter re inserted 12/4/24 for urinary retention. Flomax added    Urology on board, recommended increased ambulation, possible discharge with Wade with outpatient follow up for voiding trial  PEC/ CEC continued   Psych initiated Sertraline and Seroquel, suggested inpatient psychiatric hospitalization when medically stable.   CM on board  Morning BMP, Mag ordered        VTE prophylaxis: Heparin s/c     Patient condition:  Fair     Anticipated discharge and Disposition:   Inpatient Psych placement once C diff treatment is completed per Lauren, and refeeding syndrome resolves      All diagnosis and differential diagnosis have been reviewed; assessment and plan has been documented; I have personally reviewed the labs and test results that are presently available; I have reviewed the patients medication list; I have reviewed the consulting providers  response and recommendations. I have reviewed or attempted to review medical records based upon their availability    All of the patient's questions have been  addressed and answered. Patient's is agreeable to the above stated plan. I will continue to monitor closely and make adjustments to medical management as needed.    Portions of this note dictated using EMR integrated voice recognition software, and may be subject to voice recognition errors not corrected at proofreading. Please contact writer for clarification if needed.   _____________________________________________________________________    Malnutrition Status:  Nutrition consulted. Most recent weight and BMI monitored-     Measurements:  Wt Readings from Last 1 Encounters:   12/07/24 98.3 kg (216 lb 11.4 oz)   Body mass index is 30.23 kg/m².    Patient has been screened and assessed by RD.    Malnutrition Type:  Context:    Level:      Malnutrition Characteristic Summary:       Interventions/Recommendations (treatment strategy):        Scheduled Med:   cetirizine  10 mg Oral Daily    chlordiazepoxide  25 mg Oral BID    cholestyramine-aspartame  4 grams of anhydrous cholestyramine Oral BID    fluticasone propionate  2 spray Each Nostril BID    folic acid  1 mg Oral Daily    heparin (porcine)  5,000 Units Subcutaneous Q12H    multivitamin  1 tablet Oral Daily    QUEtiapine  50 mg Oral QHS    sertraline  100 mg Oral Daily    tamsulosin  0.4 mg Oral Daily    thiamine  100 mg Oral BID    vancomycin  125 mg Oral Q6H      Continuous Infusions:     PRN Meds:  Current Facility-Administered Medications:     acetaminophen, 1,000 mg, Oral, Q6H PRN    aluminum-magnesium hydroxide-simethicone, 30 mL, Oral, QID PRN    bisacodyL, 10 mg, Rectal, Daily PRN    butalbital-acetaminophen-caffeine -40 mg, 1 tablet, Oral, Q4H PRN    dextrose 10%, 12.5 g, Intravenous, PRN    dextrose 10%, 25 g, Intravenous, PRN    glucagon (human recombinant), 1 mg, Intramuscular, PRN     glucose, 16 g, Oral, PRN    glucose, 24 g, Oral, PRN    heparin, porcine (PF), 5 mL, Intra-Catheter, PRN    melatonin, 6 mg, Oral, Nightly PRN    naloxone, 0.02 mg, Intravenous, PRN    ondansetron, 4 mg, Intravenous, Q4H PRN    prochlorperazine, 5 mg, Intravenous, Q6H PRN    sodium chloride 0.9%, 10 mL, Intravenous, PRN         Luis Tristan MD  Department of Hospital Medicine   [facility]   12/07/2024

## 2024-12-07 NOTE — PLAN OF CARE
Problem: Adult Inpatient Plan of Care  Goal: Plan of Care Review  Outcome: Progressing  Goal: Patient-Specific Goal (Individualized)  Outcome: Progressing  Goal: Absence of Hospital-Acquired Illness or Injury  Outcome: Progressing  Goal: Optimal Comfort and Wellbeing  Outcome: Progressing  Goal: Readiness for Transition of Care  Outcome: Progressing     Problem: Suicide Risk  Goal: Absence of Self-Harm  Outcome: Progressing     Problem: Infection  Goal: Absence of Infection Signs and Symptoms  Outcome: Progressing     Problem: Diarrhea  Goal: Effective Diarrhea Management  Outcome: Progressing     Problem: Fall Injury Risk  Goal: Absence of Fall and Fall-Related Injury  Outcome: Progressing

## 2024-12-07 NOTE — PLAN OF CARE
Problem: Adult Inpatient Plan of Care  Goal: Plan of Care Review  Outcome: Progressing  Flowsheets (Taken 12/7/2024 2261)  Plan of Care Reviewed With: patient  Goal: Patient-Specific Goal (Individualized)  Outcome: Progressing  Goal: Absence of Hospital-Acquired Illness or Injury  Outcome: Progressing  Goal: Optimal Comfort and Wellbeing  Outcome: Progressing  Goal: Readiness for Transition of Care  Outcome: Progressing     Problem: Suicidal Behavior  Goal: Suicidal Behavior is Absent or Managed  Outcome: Progressing     Problem: Suicide Risk  Goal: Absence of Self-Harm  Outcome: Progressing     Problem: Infection  Goal: Absence of Infection Signs and Symptoms  Outcome: Progressing     Problem: Diarrhea  Goal: Effective Diarrhea Management  Outcome: Progressing     Problem: Fall Injury Risk  Goal: Absence of Fall and Fall-Related Injury  Outcome: Progressing

## 2024-12-07 NOTE — PROGRESS NOTES
"12/7/2024 2:03 PM   Gabe Barnett   1965   [id number]        Psychiatry Progress Note     SUBJECTIVE:   Gabe Barnett is a 59-year-old male admitted under PEC at [facility] with a history of alcohol abuse, seizures, atrial fibrillation (previously on Eliquis, discontinued), hypertension, and non-ischemic cardiomyopathy (NICMO). He was brought to the ED by EMS after being found in a motel bathtub covered in feces and surrounded by empty alcohol bottles. The patient reports quitting alcohol one month ago but admits to a history of heavy drinking. Upon arrival at the ED, he was tremulous and agitated, which improved with Serax administration. He revealed that he entered the bathtub with the intention of breaking an alcohol bottle to "cut my vein," indicating recent suicidal ideation. He endorses feeling very depressed and has not taken any prescribed medications in several months. The patient also reports a history of vomiting and watery diarrhea over the past several months, though he is unsure if there has been any blood in his emesis or stool. He has been living in a hotel and experiencing significant functional and emotional decline.     Seen at the bedside with 1:1 sitter present. Resting quietly in bed in no apparent distress. Polite and cooperative with interview. Endorses depressed mood and displays mood-congruent and flat affect. Continues to endorse passive suicidal ideations without a plan - "Those thoughts are always there". He continues to endorse feelings of hopelessness and helplessness. No evidence of psychosis present. Denies homicidal ideations, auditory/visual hallucinations, or paranoia. Reports no issues with sleep or appetite. Denies adverse effects to current psychotropic medication regimen; there is no evidence of EPS, TD, or dystonia present.       Current Medications:   Scheduled Meds:    cetirizine  10 mg Oral Daily    chlordiazepoxide  25 mg Oral BID    cholestyramine-aspartame  4 grams of " anhydrous cholestyramine Oral BID    fluticasone propionate  2 spray Each Nostril BID    folic acid  1 mg Oral Daily    heparin (porcine)  5,000 Units Subcutaneous Q12H    multivitamin  1 tablet Oral Daily    QUEtiapine  50 mg Oral QHS    sertraline  100 mg Oral Daily    tamsulosin  0.4 mg Oral Daily    thiamine  100 mg Oral BID    vancomycin  125 mg Oral Q6H      PRN Meds:   Current Facility-Administered Medications:     acetaminophen, 1,000 mg, Oral, Q6H PRN    aluminum-magnesium hydroxide-simethicone, 30 mL, Oral, QID PRN    bisacodyL, 10 mg, Rectal, Daily PRN    butalbital-acetaminophen-caffeine -40 mg, 1 tablet, Oral, Q4H PRN    dextrose 10%, 12.5 g, Intravenous, PRN    dextrose 10%, 25 g, Intravenous, PRN    glucagon (human recombinant), 1 mg, Intramuscular, PRN    glucose, 16 g, Oral, PRN    glucose, 24 g, Oral, PRN    heparin, porcine (PF), 5 mL, Intra-Catheter, PRN    melatonin, 6 mg, Oral, Nightly PRN    naloxone, 0.02 mg, Intravenous, PRN    ondansetron, 4 mg, Intravenous, Q4H PRN    prochlorperazine, 5 mg, Intravenous, Q6H PRN    sodium chloride 0.9%, 10 mL, Intravenous, PRN   Psychotherapeutics (From admission, onward)      Start     Stop Route Frequency Ordered    12/05/24 0900  sertraline tablet 100 mg         -- Oral Daily 12/04/24 1123    12/03/24 2100  chlordiazepoxide capsule 25 mg         -- Oral 2 times daily 12/03/24 0956    12/01/24 2100  QUEtiapine tablet 50 mg         -- Oral Nightly 12/01/24 1227            Allergies:   Review of patient's allergies indicates:   Allergen Reactions    Niacin Other (See Comments)     I get flushed and burn up        OBJECTIVE:   Vitals   Vitals:    12/07/24 0400   BP: 115/79   Pulse: 81   Resp:    Temp: 98.2 °F (36.8 °C)        Labs/Imaging/Studies:   Recent Results (from the past 36 hours)   CBC Without Differential    Collection Time: 12/06/24  3:31 AM   Result Value Ref Range    WBC 4.77 4.50 - 11.50 x10(3)/mcL    RBC 3.07 (L) 4.70 - 6.10 x10(6)/mcL     Hgb 9.1 (L) 14.0 - 18.0 g/dL    Hct 28.3 (L) 42.0 - 52.0 %    MCV 92.2 80.0 - 94.0 fL    MCH 29.6 27.0 - 31.0 pg    MCHC 32.2 (L) 33.0 - 36.0 g/dL    RDW 14.2 11.5 - 17.0 %    Platelet 186 130 - 400 x10(3)/mcL    MPV 9.4 7.4 - 10.4 fL    NRBC% 0.0 %   Basic Metabolic Panel    Collection Time: 12/06/24  3:31 AM   Result Value Ref Range    Sodium 139 136 - 145 mmol/L    Potassium 4.7 3.5 - 5.1 mmol/L    Chloride 111 (H) 98 - 107 mmol/L    CO2 20 (L) 23 - 31 mmol/L    Glucose 89 82 - 115 mg/dL    Blood Urea Nitrogen 8.5 8.4 - 25.7 mg/dL    Creatinine 1.00 0.72 - 1.25 mg/dL    BUN/Creatinine Ratio 9     Calcium 8.6 (L) 8.8 - 10.0 mg/dL    Anion Gap 8.0 mEq/L    eGFR >60 mL/min/1.73/m2   Magnesium    Collection Time: 12/06/24  3:31 AM   Result Value Ref Range    Magnesium Level 1.90 1.60 - 2.60 mg/dL   Phosphorus    Collection Time: 12/06/24  3:31 AM   Result Value Ref Range    Phosphorus Level 1.6 (L) 2.3 - 4.7 mg/dL   Basic Metabolic Panel    Collection Time: 12/07/24  3:23 AM   Result Value Ref Range    Sodium 139 136 - 145 mmol/L    Potassium 5.4 (H) 3.5 - 5.1 mmol/L    Chloride 113 (H) 98 - 107 mmol/L    CO2 19 (L) 23 - 31 mmol/L    Glucose 84 82 - 115 mg/dL    Blood Urea Nitrogen 12.0 8.4 - 25.7 mg/dL    Creatinine 1.01 0.72 - 1.25 mg/dL    BUN/Creatinine Ratio 12     Calcium 8.5 (L) 8.8 - 10.0 mg/dL    Anion Gap 7.0 mEq/L    eGFR >60 mL/min/1.73/m2   Magnesium    Collection Time: 12/07/24  3:23 AM   Result Value Ref Range    Magnesium Level 1.40 (L) 1.60 - 2.60 mg/dL        Psychiatric Mental Status Exam:  General Appearance: appears stated age, dressed in hospital garb, lying in bed, in no acute distress  Arousal: alert  Behavior: polite, under good behavioral control, poor eye contact  Movements and Motor Activity: no tics, no tremors, no akathisia, no dystonia, no evidence of tardive dyskinesia  Orientation: oriented to person, place, and time  Speech: normal rate, rhythm, volume, tone and pitch  Mood:  Depressed  Affect: mood-congruent, constricted, flat  Thought Process: goal-directed  Associations: no loosening of associations  Thought Content and Perceptions: + passive suicidal ideation, no homicidal ideation, no hallucinations, no delusions  Recent and Remote Memory: grossly intact; per interview/observation with patient  Attention and Concentration: grossly intact; per interview/observation with patient  Fund of Knowledge: grossly intact; based on history, vocabulary, fund of knowledge, syntax, grammar, and content  Insight: adequate; based on understanding of severity of illness and HPI  Judgment: questionable; based on patient's behavior and HPI    ASSESSMENT/PLAN:   Diagnosis:  Major Depressive Disorder, 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 No pertinent past medical history.     Plan:    Medication Management  Quetiapine 50mg PO QHS  Sertraline 100mg PO QD    Legal  Continue PEC and 1:1 sitter.    Disposition  Recommend inpatient psychiatric hospitalization when medically stable.    Psych will continue to follow      ZULAY WinterP-BC  12/7/2024

## 2024-12-08 LAB
ANION GAP SERPL CALC-SCNC: 6 MEQ/L
BUN SERPL-MCNC: 12.5 MG/DL (ref 8.4–25.7)
CALCIUM SERPL-MCNC: 9 MG/DL (ref 8.8–10)
CHLORIDE SERPL-SCNC: 112 MMOL/L (ref 98–107)
CO2 SERPL-SCNC: 20 MMOL/L (ref 23–31)
CREAT SERPL-MCNC: 1.11 MG/DL (ref 0.72–1.25)
CREAT/UREA NIT SERPL: 11
GFR SERPLBLD CREATININE-BSD FMLA CKD-EPI: >60 ML/MIN/1.73/M2
GLUCOSE SERPL-MCNC: 85 MG/DL (ref 82–115)
MAGNESIUM SERPL-MCNC: 1.6 MG/DL (ref 1.6–2.6)
POCT GLUCOSE: 82 MG/DL (ref 70–110)
POCT GLUCOSE: 91 MG/DL (ref 70–110)
POTASSIUM SERPL-SCNC: 5.9 MMOL/L (ref 3.5–5.1)
SODIUM SERPL-SCNC: 138 MMOL/L (ref 136–145)

## 2024-12-08 PROCEDURE — 80048 BASIC METABOLIC PNL TOTAL CA: CPT | Performed by: INTERNAL MEDICINE

## 2024-12-08 PROCEDURE — 25000003 PHARM REV CODE 250: Performed by: INTERNAL MEDICINE

## 2024-12-08 PROCEDURE — 25000003 PHARM REV CODE 250

## 2024-12-08 PROCEDURE — 21400001 HC TELEMETRY ROOM

## 2024-12-08 PROCEDURE — 63600175 PHARM REV CODE 636 W HCPCS: Performed by: INTERNAL MEDICINE

## 2024-12-08 PROCEDURE — 36415 COLL VENOUS BLD VENIPUNCTURE: CPT | Performed by: INTERNAL MEDICINE

## 2024-12-08 PROCEDURE — 83735 ASSAY OF MAGNESIUM: CPT | Performed by: INTERNAL MEDICINE

## 2024-12-08 PROCEDURE — 25000003 PHARM REV CODE 250: Performed by: NURSE PRACTITIONER

## 2024-12-08 PROCEDURE — 27000207 HC ISOLATION

## 2024-12-08 RX ORDER — MAGNESIUM SULFATE HEPTAHYDRATE 40 MG/ML
2 INJECTION, SOLUTION INTRAVENOUS ONCE
Status: COMPLETED | OUTPATIENT
Start: 2024-12-08 | End: 2024-12-08

## 2024-12-08 RX ADMIN — THIAMINE HCL TAB 100 MG 100 MG: 100 TAB at 09:12

## 2024-12-08 RX ADMIN — ALUMINUM HYDROXIDE, MAGNESIUM HYDROXIDE, AND SIMETHICONE 30 ML: 1200; 120; 1200 SUSPENSION ORAL at 09:12

## 2024-12-08 RX ADMIN — THERA TABS 1 TABLET: TAB at 09:12

## 2024-12-08 RX ADMIN — FLUTICASONE PROPIONATE 100 MCG: 50 SPRAY, METERED NASAL at 09:12

## 2024-12-08 RX ADMIN — CHLORDIAZEPOXIDE HYDROCHLORIDE 25 MG: 25 CAPSULE ORAL at 09:12

## 2024-12-08 RX ADMIN — HEPARIN SODIUM 5000 UNITS: 5000 INJECTION, SOLUTION INTRAVENOUS; SUBCUTANEOUS at 09:12

## 2024-12-08 RX ADMIN — VANCOMYCIN HYDROCHLORIDE 125 MG: 125 CAPSULE ORAL at 05:12

## 2024-12-08 RX ADMIN — ALUMINUM HYDROXIDE, MAGNESIUM HYDROXIDE, AND SIMETHICONE 30 ML: 1200; 120; 1200 SUSPENSION ORAL at 05:12

## 2024-12-08 RX ADMIN — Medication 400 MG: at 09:12

## 2024-12-08 RX ADMIN — TAMSULOSIN HYDROCHLORIDE 0.4 MG: 0.4 CAPSULE ORAL at 09:12

## 2024-12-08 RX ADMIN — FOLIC ACID 1 MG: 1 TABLET ORAL at 09:12

## 2024-12-08 RX ADMIN — SODIUM ZIRCONIUM CYCLOSILICATE 10 G: 10 POWDER, FOR SUSPENSION ORAL at 09:12

## 2024-12-08 RX ADMIN — VANCOMYCIN HYDROCHLORIDE 125 MG: 125 CAPSULE ORAL at 11:12

## 2024-12-08 RX ADMIN — MAGNESIUM SULFATE HEPTAHYDRATE 2 G: 40 INJECTION, SOLUTION INTRAVENOUS at 05:12

## 2024-12-08 RX ADMIN — QUETIAPINE FUMARATE 50 MG: 25 TABLET ORAL at 09:12

## 2024-12-08 RX ADMIN — CETIRIZINE HYDROCHLORIDE 10 MG: 10 TABLET, FILM COATED ORAL at 09:12

## 2024-12-08 RX ADMIN — SERTRALINE HYDROCHLORIDE 100 MG: 50 TABLET ORAL at 09:12

## 2024-12-08 NOTE — PLAN OF CARE
Problem: Adult Inpatient Plan of Care  Goal: Plan of Care Review  12/7/2024 2142 by Allan Guzman RN  Outcome: Progressing  12/7/2024 2133 by Allan Guzman RN  Outcome: Progressing  Goal: Patient-Specific Goal (Individualized)  12/7/2024 2142 by Allan Guzman RN  Outcome: Progressing  12/7/2024 2133 by Allan Guzman RN  Outcome: Progressing  Goal: Absence of Hospital-Acquired Illness or Injury  12/7/2024 2142 by Allan Guzman RN  Outcome: Progressing  12/7/2024 2133 by Allan Guzman RN  Outcome: Progressing  Goal: Optimal Comfort and Wellbeing  12/7/2024 2142 by Allan Guzman RN  Outcome: Progressing  12/7/2024 2133 by Allan Guzman RN  Outcome: Progressing  Goal: Readiness for Transition of Care  12/7/2024 2142 by Allan Guzman RN  Outcome: Progressing  12/7/2024 2133 by Allan Guzman RN  Outcome: Progressing     Problem: Suicidal Behavior  Goal: Suicidal Behavior is Absent or Managed  12/7/2024 2142 by Allan Guzman RN  Outcome: Progressing  12/7/2024 2133 by Allan Guzman RN  Outcome: Progressing     Problem: Suicide Risk  Goal: Absence of Self-Harm  12/7/2024 2142 by Allan Guzman RN  Outcome: Progressing  12/7/2024 2133 by Allan Guzman RN  Outcome: Progressing     Problem: Infection  Goal: Absence of Infection Signs and Symptoms  12/7/2024 2142 by Allan Guzman RN  Outcome: Progressing  12/7/2024 2133 by Allan Guzman RN  Outcome: Progressing     Problem: Diarrhea  Goal: Effective Diarrhea Management  12/7/2024 2142 by Allan Guzman RN  Outcome: Progressing  12/7/2024 2133 by Allan Guzman RN  Outcome: Progressing     Problem: Fall Injury Risk  Goal: Absence of Fall and Fall-Related Injury  12/7/2024 2142 by Allan Guzman RN  Outcome: Progressing  12/7/2024 2133 by Allan Guzman, RN  Outcome:  Progressing

## 2024-12-08 NOTE — PROGRESS NOTES
"12/8/2024 2:03 PM   Gabe Barnett   1965   [id number]        Psychiatry Progress Note     SUBJECTIVE:   Gabe Barnett is a 59-year-old male admitted under PEC at [facility] with a history of alcohol abuse, seizures, atrial fibrillation (previously on Eliquis, discontinued), hypertension, and non-ischemic cardiomyopathy (NICMO). He was brought to the ED by EMS after being found in a motel bathtub covered in feces and surrounded by empty alcohol bottles. The patient reports quitting alcohol one month ago but admits to a history of heavy drinking. Upon arrival at the ED, he was tremulous and agitated, which improved with Serax administration. He revealed that he entered the bathtub with the intention of breaking an alcohol bottle to "cut my vein," indicating recent suicidal ideation. He endorses feeling very depressed and has not taken any prescribed medications in several months. The patient also reports a history of vomiting and watery diarrhea over the past several months, though he is unsure if there has been any blood in his emesis or stool. He has been living in a hotel and experiencing significant functional and emotional decline.     Seen at the bedside with 1:1 sitter present. Resting quietly in bed in no apparent distress. Polite and cooperative with interview. Reports that his mood is "the same" and displays mood-congruent and flat affect. Continues to endorse passive suicidal ideations without a plan. He continues to endorse feelings of hopelessness and helplessness. No evidence of psychosis present. Denies homicidal ideations, auditory/visual hallucinations, or paranoia. Reports no issues with sleep or appetite. Denies adverse effects to current psychotropic medication regimen; there is no evidence of EPS, TD, or dystonia present.       Current Medications:   Scheduled Meds:    cetirizine  10 mg Oral Daily    chlordiazepoxide  25 mg Oral BID    fluticasone propionate  2 spray Each Nostril BID    folic acid  1 mg " Oral Daily    heparin (porcine)  5,000 Units Subcutaneous Q12H    magnesium oxide  400 mg Oral Daily    multivitamin  1 tablet Oral Daily    QUEtiapine  50 mg Oral QHS    sertraline  100 mg Oral Daily    tamsulosin  0.4 mg Oral Daily    thiamine  100 mg Oral BID    vancomycin  125 mg Oral Q6H      PRN Meds:   Current Facility-Administered Medications:     acetaminophen, 1,000 mg, Oral, Q6H PRN    aluminum-magnesium hydroxide-simethicone, 30 mL, Oral, QID PRN    bisacodyL, 10 mg, Rectal, Daily PRN    butalbital-acetaminophen-caffeine -40 mg, 1 tablet, Oral, Q4H PRN    dextrose 10%, 12.5 g, Intravenous, PRN    dextrose 10%, 25 g, Intravenous, PRN    glucagon (human recombinant), 1 mg, Intramuscular, PRN    glucose, 16 g, Oral, PRN    glucose, 24 g, Oral, PRN    heparin, porcine (PF), 5 mL, Intra-Catheter, PRN    melatonin, 6 mg, Oral, Nightly PRN    naloxone, 0.02 mg, Intravenous, PRN    ondansetron, 4 mg, Intravenous, Q4H PRN    prochlorperazine, 5 mg, Intravenous, Q6H PRN    sodium chloride 0.9%, 10 mL, Intravenous, PRN   Psychotherapeutics (From admission, onward)      Start     Stop Route Frequency Ordered    12/05/24 0900  sertraline tablet 100 mg         -- Oral Daily 12/04/24 1123    12/03/24 2100  chlordiazepoxide capsule 25 mg         -- Oral 2 times daily 12/03/24 0956    12/01/24 2100  QUEtiapine tablet 50 mg         -- Oral Nightly 12/01/24 1227            Allergies:   Review of patient's allergies indicates:   Allergen Reactions    Niacin Other (See Comments)     I get flushed and burn up        OBJECTIVE:   Vitals   Vitals:    12/08/24 1133   BP: 116/79   Pulse: 90   Resp: 18   Temp: 98.1 °F (36.7 °C)        Labs/Imaging/Studies:   Recent Results (from the past 36 hours)   Basic Metabolic Panel    Collection Time: 12/07/24  3:23 AM   Result Value Ref Range    Sodium 139 136 - 145 mmol/L    Potassium 5.4 (H) 3.5 - 5.1 mmol/L    Chloride 113 (H) 98 - 107 mmol/L    CO2 19 (L) 23 - 31 mmol/L    Glucose  84 82 - 115 mg/dL    Blood Urea Nitrogen 12.0 8.4 - 25.7 mg/dL    Creatinine 1.01 0.72 - 1.25 mg/dL    BUN/Creatinine Ratio 12     Calcium 8.5 (L) 8.8 - 10.0 mg/dL    Anion Gap 7.0 mEq/L    eGFR >60 mL/min/1.73/m2   Magnesium    Collection Time: 12/07/24  3:23 AM   Result Value Ref Range    Magnesium Level 1.40 (L) 1.60 - 2.60 mg/dL   POCT glucose    Collection Time: 12/07/24  8:25 PM   Result Value Ref Range    POCT Glucose 110 70 - 110 mg/dL   Basic Metabolic Panel    Collection Time: 12/08/24  2:34 AM   Result Value Ref Range    Sodium 138 136 - 145 mmol/L    Potassium 5.9 (H) 3.5 - 5.1 mmol/L    Chloride 112 (H) 98 - 107 mmol/L    CO2 20 (L) 23 - 31 mmol/L    Glucose 85 82 - 115 mg/dL    Blood Urea Nitrogen 12.5 8.4 - 25.7 mg/dL    Creatinine 1.11 0.72 - 1.25 mg/dL    BUN/Creatinine Ratio 11     Calcium 9.0 8.8 - 10.0 mg/dL    Anion Gap 6.0 mEq/L    eGFR >60 mL/min/1.73/m2   Magnesium    Collection Time: 12/08/24  2:34 AM   Result Value Ref Range    Magnesium Level 1.60 1.60 - 2.60 mg/dL   POCT glucose    Collection Time: 12/08/24  4:03 AM   Result Value Ref Range    POCT Glucose 82 70 - 110 mg/dL        Psychiatric Mental Status Exam:  General Appearance: appears stated age, dressed in hospital garb, lying in bed, in no acute distress  Arousal: alert  Behavior: polite, under good behavioral control, poor eye contact  Movements and Motor Activity: no tics, no tremors, no akathisia, no dystonia, no evidence of tardive dyskinesia  Orientation: oriented to person, place, and time  Speech: normal rate, rhythm, volume, tone and pitch  Mood: Depressed  Affect: mood-congruent, constricted, flat  Thought Process: goal-directed  Associations: no loosening of associations  Thought Content and Perceptions: + passive suicidal ideation, no homicidal ideation, no hallucinations, no delusions  Recent and Remote Memory: grossly intact; per interview/observation with patient  Attention and Concentration: grossly intact; per  interview/observation with patient  Fund of Knowledge: grossly intact; based on history, vocabulary, fund of knowledge, syntax, grammar, and content  Insight: adequate; based on understanding of severity of illness and HPI  Judgment: questionable; based on patient's behavior and HPI    ASSESSMENT/PLAN:   Diagnosis:  Major Depressive Disorder, 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 No pertinent past medical history.     Plan:  Medication Management  Quetiapine 50mg PO QHS  Sertraline 100mg PO QD     Legal  Continue PEC and 1:1 sitter.     Disposition  Recommend inpatient psychiatric hospitalization when medically stable.     Psych will continue to follow      ERENDIRA Winter-BC  12/8/2024

## 2024-12-08 NOTE — PROGRESS NOTES
"[facility] Hospital Medicine Progress Note        Chief Complaint: Inpatient Follow-up       HPI per admittin yr old male with PMHx of Alcohol abuse, Seizures (I would assume it was alcohol withdrawal seizure), atrial fibrillation (previously on Eliquis but quit taking), HTN, and NICMO now recovered with LVEF 65-70% on echo 24 was brought to the ED by  EMS after he was found in a Motel in tub covered in feces surrounded by empty alcohol bottles. Patient reports he quit drinking one month ago. In the ED he was tremulous and agitated on arrival that had  improved with Serax. Has been living in a motel. Reports he got in the tub with intention of breaking one of the alcohol bottles and "cutting my vein". Admits to feeling very depressed. Has not taken any of his medications in several months. Reports several month history of vomiting and watery diarrhea. Unsure if he's noticed any blood in emesis or stool.   VS on arrival: T 95.7, P 98, R 18, BP 96/46, Sats 98% on room air. Initial labs: WBC 9.99, Hgb 11.1, Plt 246, Na 133, K 5.2, Cl 87, bicarb 9, , creatinine 16.67, glucose 131, AST 8, ALT 10, lipase 161. Lactic acid 2.2, CPK, ETOH, TSH, ammonia, Troponin normal.  CT abdomen and pelvis shows patchy ground-glass in the lung bases suggestive of infectious or inflammatory process.  No acute process identified in the abdomen or pelvis. Retroperitoneal ultrasound unremarkable. Pt was admitted to  service . Nephrology was consulted to assist. Pt was placed under PEC due to suicidal ideation and attempt. Psych consulted.   Pt was started on Calcium gluconate, D10 with IV insulin and Lokelma per hyperkalemia protocol. Aggressive IV hydration with bicarb drip, high dose thiamine, Librium taper, folic acid and multivitamin. Blood cultures were drawn and Zosyn initiated for presumed aspiration pneumonia. Stool was collected for C.diff toxins and came back positive. Initiated on " oral Vancomycin. Pt remained with profound metabolic acidosis with large AG and azotemia. Nephrology consulted General Surgery for temporary HD catheter and Hemodialysis initiated. Pt underwent single session dialysis on 12/1/24. UOP increased. No further dialysis needed. Renal function cont to recover and normalized as of 12/4/24. Pt now became more awake, alert and interactive but with flat affect. Diarrhea persisted. Questran added to mitigate stool frequency. Diarrhea improving. Wade catheter removed on 12/3/24. However unable to void spontaneously with >800 ml retention. Wade reinserted 12/4/24. Flomax added. Urology consult requested. PEC continued. Psych initiated Sertraline and Seroquel and inpatient psychiatric hospitalization when medically stable.      Interval Hx:   Patient is laying in bed, still has a very flat affect.  States he does not feel like talking much. No major complaints, reports diarrhea has resolved and he is trending ample amount of fluids throughout the day and eating well  Reported he ambulates in the room from time to time  Sitter is at bedside   Case was discussed with patient's nurse on the floor.    Objective/physical exam:  General: In no acute distress, afebrile, on RA  Chest: Clear to auscultation bilaterally  Heart: RRR, +S1, S2, no appreciable murmur  Abdomen: Soft, nontender, BS +  MSK: Warm, no lower extremity edema, no clubbing or cyanosis  Neurologic: Alert and oriented, flat affect    VITAL SIGNS: 24 HRS MIN & MAX LAST   Temp  Min: 97.3 °F (36.3 °C)  Max: 98.2 °F (36.8 °C) 98.2 °F (36.8 °C)   BP  Min: 110/74  Max: 127/80 123/80   Pulse  Min: 80  Max: 97  97   Resp  Min: 13  Max: 20 20   SpO2  Min: 95 %  Max: 99 % 95 %     I have reviewed the following labs:  Recent Labs   Lab 12/03/24  0329 12/04/24  0345 12/06/24  0331   WBC 3.75* 3.83* 4.77   RBC 3.14* 3.03* 3.07*   HGB 9.3* 9.0* 9.1*   HCT 27.8* 27.9* 28.3*   MCV 88.5 92.1 92.2   MCH 29.6 29.7 29.6   MCHC 33.5 32.3*  32.2*   RDW 14.2 14.4 14.2    203 186   MPV 10.1 10.0 9.4     Recent Labs   Lab 12/02/24  0338 12/03/24  0329 12/04/24  0345 12/05/24  0330 12/06/24  0331 12/07/24  0323 12/08/24  0234   * 145 140   < > 139 139 138   K 3.2* 4.4 4.0   < > 4.7 5.4* 5.9*   CL 90* 102 103   < > 111* 113* 112*   CO2 28 31 28   < > 20* 19* 20*   .2* 76.4* 28.1*   < > 8.5 12.0 12.5   CREATININE 5.78* 2.46* 1.08   < > 1.00 1.01 1.11   CALCIUM 7.3* 7.6* 8.1*   < > 8.6* 8.5* 9.0   MG 1.50* 1.10* 1.30*   < > 1.90 1.40* 1.60   ALBUMIN 2.6* 2.8* 2.6*  --   --   --   --    ALKPHOS 50  --   --   --   --   --   --    ALT 8  --   --   --   --   --   --    AST 10  --   --   --   --   --   --    BILITOT 0.3  --   --   --   --   --   --     < > = values in this interval not displayed.     Microbiology Results (last 7 days)       Procedure Component Value Units Date/Time    Blood Culture [[id number]]  (Normal) Collected: 11/30/24 2007    Order Status: Completed Specimen: Blood, Venous Updated: 12/05/24 2100     Blood Culture No Growth at 5 days    Blood Culture [[id number]]  (Normal) Collected: 11/30/24 2007    Order Status: Completed Specimen: Blood, Venous Updated: 12/05/24 2100     Blood Culture No Growth at 5 days             See below for Radiology    Intake/Output:  Intake/Output Summary (Last 24 hours) at 12/8/2024 1628  Last data filed at 12/8/2024 1437  Gross per 24 hour   Intake 2580 ml   Output 4200 ml   Net -1620 ml          Assessment/Plan:  Acute kidney injury with profound azotemia- POA- Resolved with 1 session of hemodialysis  Hyperkalemia due to above- POA- resolved   Acute urinary retention - 12/4/24- s/p re insertion of Wade catheter   Large AG profound metabolic acidosis, POA- resolved   C.diff infection and diarrhea- POA  Upper respiratory symptoms with sinus congestion and sinus pressure- POA- improving  Suspected Aspiration Pneumonia- POA- completed antibiotic with Zosyn  Hyponatremia - POA- resolved    Hypotension due to severe volume depletion, POA- resolved   Alcohol abuse and dependency - now with refeeding syndrome  Substance abuse  Mood disorder, Major depression and Suicidal ideation- POA  PEC/ CEC status - POA  Hx- PAF, HTN, NICMO         Oral vancomycin for C.diff infection and diarrhea- day 8 of 10  Questran added, diarrhea has resolved  S/P single session hemodialysis on  12/1/24 to correct profound azotemia and severe AG metabolic acidosis.   Renal parameter currently normalized   IVF discontinued as of 12/4/24  Librium taper and CIWA scale for alcohol withdrawal   Thiamine 100 mg b.i.d.  Mag 1.6,, ordered Mag sulfate 2 g IV x1, cont Mag oxide 400 mg daily  Phos 1.6 but Potassium went up with Neutraphos to 5.4,  d/c neutraphos   Potassium is even higher at 5.9, ordered Lokelma 10 g p.o. x1  Keep Mag greater than 2 and phos greater than 3, potassium greater than 4  Continue other supportive treatment   Completed course of IV Zosyn for presumed aspiration pneumonitis  Antihistamines and decongestant for sinus symptoms   Wade catheter re inserted 12/4/24 for urinary retention. Flomax added    Urology on board, recommended increased ambulation, possible discharge with Wade with outpatient follow up for voiding trial  PEC/ CEC continued   Psych initiated Sertraline and Seroquel, suggested inpatient psychiatric hospitalization when medically stable.   CM on board  Morning CBC, renal functions, Mag ordered        VTE prophylaxis: Heparin s/c     Patient condition:  Fair     Anticipated discharge and Disposition:   Inpatient Psych placement once C diff treatment is completed per Lauren, and refeeding syndrome resolves      All diagnosis and differential diagnosis have been reviewed; assessment and plan has been documented; I have personally reviewed the labs and test results that are presently available; I have reviewed the patients medication list; I have reviewed the consulting providers response and  recommendations. I have reviewed or attempted to review medical records based upon their availability    All of the patient's questions have been  addressed and answered. Patient's is agreeable to the above stated plan. I will continue to monitor closely and make adjustments to medical management as needed.    Portions of this note dictated using EMR integrated voice recognition software, and may be subject to voice recognition errors not corrected at proofreading. Please contact writer for clarification if needed.   _____________________________________________________________________    Malnutrition Status:  Nutrition consulted. Most recent weight and BMI monitored-     Measurements:  Wt Readings from Last 1 Encounters:   12/08/24 96.6 kg (212 lb 15.4 oz)   Body mass index is 29.7 kg/m².    Patient has been screened and assessed by RD.    Malnutrition Type:  Context:    Level:      Malnutrition Characteristic Summary:       Interventions/Recommendations (treatment strategy):        Scheduled Med:   cetirizine  10 mg Oral Daily    chlordiazepoxide  25 mg Oral BID    fluticasone propionate  2 spray Each Nostril BID    folic acid  1 mg Oral Daily    heparin (porcine)  5,000 Units Subcutaneous Q12H    magnesium oxide  400 mg Oral Daily    magnesium sulfate IVPB  2 g Intravenous Once    multivitamin  1 tablet Oral Daily    QUEtiapine  50 mg Oral QHS    sertraline  100 mg Oral Daily    tamsulosin  0.4 mg Oral Daily    thiamine  100 mg Oral BID    vancomycin  125 mg Oral Q6H      Continuous Infusions:     PRN Meds:  Current Facility-Administered Medications:     acetaminophen, 1,000 mg, Oral, Q6H PRN    aluminum-magnesium hydroxide-simethicone, 30 mL, Oral, QID PRN    bisacodyL, 10 mg, Rectal, Daily PRN    butalbital-acetaminophen-caffeine -40 mg, 1 tablet, Oral, Q4H PRN    dextrose 10%, 12.5 g, Intravenous, PRN    dextrose 10%, 25 g, Intravenous, PRN    glucagon (human recombinant), 1 mg, Intramuscular, PRN     glucose, 16 g, Oral, PRN    glucose, 24 g, Oral, PRN    heparin, porcine (PF), 5 mL, Intra-Catheter, PRN    melatonin, 6 mg, Oral, Nightly PRN    naloxone, 0.02 mg, Intravenous, PRN    ondansetron, 4 mg, Intravenous, Q4H PRN    prochlorperazine, 5 mg, Intravenous, Q6H PRN    sodium chloride 0.9%, 10 mL, Intravenous, PRN         Luis Tristan MD  Department of Hospital Medicine   [facility]   12/08/2024

## 2024-12-09 LAB
ALBUMIN SERPL-MCNC: 3.2 G/DL (ref 3.4–4.8)
BUN SERPL-MCNC: 15.3 MG/DL (ref 8.4–25.7)
CALCIUM SERPL-MCNC: 8.4 MG/DL (ref 8.8–10)
CHLORIDE SERPL-SCNC: 111 MMOL/L (ref 98–107)
CO2 SERPL-SCNC: 21 MMOL/L (ref 23–31)
CREAT SERPL-MCNC: 1.03 MG/DL (ref 0.72–1.25)
ERYTHROCYTE [DISTWIDTH] IN BLOOD BY AUTOMATED COUNT: 14.2 % (ref 11.5–17)
GFR SERPLBLD CREATININE-BSD FMLA CKD-EPI: >60 ML/MIN/1.73/M2
GLUCOSE SERPL-MCNC: 84 MG/DL (ref 82–115)
HCT VFR BLD AUTO: 30.6 % (ref 42–52)
HGB BLD-MCNC: 9.5 G/DL (ref 14–18)
MAGNESIUM SERPL-MCNC: 2.1 MG/DL (ref 1.6–2.6)
MCH RBC QN AUTO: 29.1 PG (ref 27–31)
MCHC RBC AUTO-ENTMCNC: 31 G/DL (ref 33–36)
MCV RBC AUTO: 93.6 FL (ref 80–94)
NRBC BLD AUTO-RTO: 0 %
PHOSPHATE SERPL-MCNC: 2.4 MG/DL (ref 2.3–4.7)
PLATELET # BLD AUTO: 253 X10(3)/MCL (ref 130–400)
PMV BLD AUTO: 9.3 FL (ref 7.4–10.4)
POCT GLUCOSE: 77 MG/DL (ref 70–110)
POTASSIUM SERPL-SCNC: 5.5 MMOL/L (ref 3.5–5.1)
RBC # BLD AUTO: 3.27 X10(6)/MCL (ref 4.7–6.1)
SODIUM SERPL-SCNC: 136 MMOL/L (ref 136–145)
WBC # BLD AUTO: 6.02 X10(3)/MCL (ref 4.5–11.5)

## 2024-12-09 PROCEDURE — 83735 ASSAY OF MAGNESIUM: CPT | Performed by: INTERNAL MEDICINE

## 2024-12-09 PROCEDURE — 36415 COLL VENOUS BLD VENIPUNCTURE: CPT | Performed by: INTERNAL MEDICINE

## 2024-12-09 PROCEDURE — 63600175 PHARM REV CODE 636 W HCPCS: Performed by: INTERNAL MEDICINE

## 2024-12-09 PROCEDURE — 25000003 PHARM REV CODE 250: Performed by: INTERNAL MEDICINE

## 2024-12-09 PROCEDURE — 85027 COMPLETE CBC AUTOMATED: CPT | Performed by: INTERNAL MEDICINE

## 2024-12-09 PROCEDURE — 25000003 PHARM REV CODE 250

## 2024-12-09 PROCEDURE — 80069 RENAL FUNCTION PANEL: CPT | Performed by: INTERNAL MEDICINE

## 2024-12-09 PROCEDURE — 21400001 HC TELEMETRY ROOM

## 2024-12-09 PROCEDURE — 27000207 HC ISOLATION

## 2024-12-09 PROCEDURE — 25000003 PHARM REV CODE 250: Performed by: NURSE PRACTITIONER

## 2024-12-09 RX ORDER — CHLORDIAZEPOXIDE HYDROCHLORIDE 25 MG/1
25 CAPSULE, GELATIN COATED ORAL DAILY
Status: DISCONTINUED | OUTPATIENT
Start: 2024-12-10 | End: 2024-12-10 | Stop reason: HOSPADM

## 2024-12-09 RX ADMIN — HEPARIN SODIUM 5000 UNITS: 5000 INJECTION, SOLUTION INTRAVENOUS; SUBCUTANEOUS at 09:12

## 2024-12-09 RX ADMIN — SERTRALINE HYDROCHLORIDE 100 MG: 50 TABLET ORAL at 09:12

## 2024-12-09 RX ADMIN — VANCOMYCIN HYDROCHLORIDE 125 MG: 125 CAPSULE ORAL at 12:12

## 2024-12-09 RX ADMIN — CETIRIZINE HYDROCHLORIDE 10 MG: 10 TABLET, FILM COATED ORAL at 09:12

## 2024-12-09 RX ADMIN — CHLORDIAZEPOXIDE HYDROCHLORIDE 25 MG: 25 CAPSULE ORAL at 09:12

## 2024-12-09 RX ADMIN — FLUTICASONE PROPIONATE 100 MCG: 50 SPRAY, METERED NASAL at 09:12

## 2024-12-09 RX ADMIN — SODIUM ZIRCONIUM CYCLOSILICATE 10 G: 10 POWDER, FOR SUSPENSION ORAL at 09:12

## 2024-12-09 RX ADMIN — THIAMINE HCL TAB 100 MG 100 MG: 100 TAB at 09:12

## 2024-12-09 RX ADMIN — QUETIAPINE FUMARATE 50 MG: 25 TABLET ORAL at 09:12

## 2024-12-09 RX ADMIN — Medication 400 MG: at 09:12

## 2024-12-09 RX ADMIN — THERA TABS 1 TABLET: TAB at 09:12

## 2024-12-09 RX ADMIN — TAMSULOSIN HYDROCHLORIDE 0.4 MG: 0.4 CAPSULE ORAL at 09:12

## 2024-12-09 RX ADMIN — VANCOMYCIN HYDROCHLORIDE 125 MG: 125 CAPSULE ORAL at 05:12

## 2024-12-09 RX ADMIN — VANCOMYCIN HYDROCHLORIDE 125 MG: 125 CAPSULE ORAL at 06:12

## 2024-12-09 RX ADMIN — FOLIC ACID 1 MG: 1 TABLET ORAL at 09:12

## 2024-12-09 NOTE — PLAN OF CARE
12/09/24 1028   Discharge Assessment   Assessment Type Discharge Planning Assessment   Source of Information patient   Reason For Admission Depression with suicidal ideation, ARF, chronic diarrhea   Facility Arrived From: [address]   Current cognitive status: Alert/Oriented   Walking or Climbing Stairs Difficulty no   Dressing/Bathing Difficulty no   Equipment Currently Used at Home none   Do you take prescription medications? Yes   Discharge Plan A Psychiatric hospital   Discharge Plan B Psychiatric hospital   DME Needed Upon Discharge  none   Discharge Plan discussed with: Patient     Pt is PEC/CEC; 1:1 sitter present in room. Pt calm and agreeable to answer questions.  Pt states last loose stool was Friday 12/6/24. Pt states he had a normal bowel movement 12/7/24 and 12/8/24. Pt's last dose of IV vancomycin is 12/10/24.  Pt states the address on the face sheet is his mailing address. He states it is his sister's address. Sister's name Marion Reardon, pt would not provide phone #. Pt stated he did not want to provide any emergency contact.  Pt states he is , has 2 children Golden age 34 and Naz age 31.  Pt states he was living at a [address] [address] in [place] ([place] address of [address] [address]) since Sept 2024 when he left [facility]. Pt states he would walk to get food and alcohol to local stores.  Explained to pt we are working on inpatient psych placement. He is in agreement. He stated he had no preference on location of inpt psych placement.

## 2024-12-09 NOTE — PROGRESS NOTES
"[facility] Hospital Medicine Progress Note        Chief Complaint: Inpatient Follow-up       HPI per admittin yr old male with PMHx of Alcohol abuse, Seizures (I would assume it was alcohol withdrawal seizure), atrial fibrillation (previously on Eliquis but quit taking), HTN, and NICMO now recovered with LVEF 65-70% on echo 24 was brought to the ED by  EMS after he was found in a Motel in tub covered in feces surrounded by empty alcohol bottles. Patient reports he quit drinking one month ago. In the ED he was tremulous and agitated on arrival that had  improved with Serax. Has been living in a motel. Reports he got in the tub with intention of breaking one of the alcohol bottles and "cutting my vein". Admits to feeling very depressed. Has not taken any of his medications in several months. Reports several month history of vomiting and watery diarrhea. Unsure if he's noticed any blood in emesis or stool.   VS on arrival: T 95.7, P 98, R 18, BP 96/46, Sats 98% on room air. Initial labs: WBC 9.99, Hgb 11.1, Plt 246, Na 133, K 5.2, Cl 87, bicarb 9, , creatinine 16.67, glucose 131, AST 8, ALT 10, lipase 161. Lactic acid 2.2, CPK, ETOH, TSH, ammonia, Troponin normal.  CT abdomen and pelvis shows patchy ground-glass in the lung bases suggestive of infectious or inflammatory process.  No acute process identified in the abdomen or pelvis. Retroperitoneal ultrasound unremarkable. Pt was admitted to  service . Nephrology was consulted to assist. Pt was placed under PEC due to suicidal ideation and attempt. Psych consulted.   Pt was started on Calcium gluconate, D10 with IV insulin and Lokelma per hyperkalemia protocol. Aggressive IV hydration with bicarb drip, high dose thiamine, Librium taper, folic acid and multivitamin. Blood cultures were drawn and Zosyn initiated for presumed aspiration pneumonia. Stool was collected for C.diff toxins and came back positive. Initiated on " oral Vancomycin. Pt remained with profound metabolic acidosis with large AG and azotemia. Nephrology consulted General Surgery for temporary HD catheter and Hemodialysis initiated. Pt underwent single session dialysis on 12/1/24. UOP increased. No further dialysis needed. Renal function cont to recover and normalized as of 12/4/24. Pt now became more awake, alert and interactive but with flat affect. Diarrhea persisted. Questran added to mitigate stool frequency. Diarrhea improving. Wade catheter removed on 12/3/24. However unable to void spontaneously with >800 ml retention. Wade reinserted 12/4/24. Flomax added. Urology consult requested. PEC continued. Psych initiated Sertraline and Seroquel and inpatient psychiatric hospitalization when medically stable.      Interval Hx:   Patient is laying in bed, still has a very flat affect.  No complaints   Reported he ambulates in the room from time to time  Sitter is at bedside   Case was discussed with patient's nurse and  on the floor.    Objective/physical exam:  General: In no acute distress, afebrile, on RA  Chest: Clear to auscultation bilaterally  Heart: RRR, +S1, S2, no appreciable murmur  Abdomen: Soft, nontender, BS +  MSK: Warm, no lower extremity edema, no clubbing or cyanosis  :  Wade in place  Neurologic: Alert and oriented, flat affect    VITAL SIGNS: 24 HRS MIN & MAX LAST   Temp  Min: 98 °F (36.7 °C)  Max: 98.3 °F (36.8 °C) 98 °F (36.7 °C)   BP  Min: 103/67  Max: 116/78 107/69   Pulse  Min: 87  Max: 95  87   Resp  Min: 18  Max: 20 18   SpO2  Min: 95 %  Max: 97 % 95 %     I have reviewed the following labs:  Recent Labs   Lab 12/04/24  0345 12/06/24  0331 12/09/24  0539   WBC 3.83* 4.77 6.02   RBC 3.03* 3.07* 3.27*   HGB 9.0* 9.1* 9.5*   HCT 27.9* 28.3* 30.6*   MCV 92.1 92.2 93.6   MCH 29.7 29.6 29.1   MCHC 32.3* 32.2* 31.0*   RDW 14.4 14.2 14.2    186 253   MPV 10.0 9.4 9.3     Recent Labs   Lab 12/03/24  0329 12/04/24  2775  12/05/24  0330 12/07/24  0323 12/08/24  0234 12/09/24  0539    140   < > 139 138 136   K 4.4 4.0   < > 5.4* 5.9* 5.5*    103   < > 113* 112* 111*   CO2 31 28   < > 19* 20* 21*   BUN 76.4* 28.1*   < > 12.0 12.5 15.3   CREATININE 2.46* 1.08   < > 1.01 1.11 1.03   CALCIUM 7.6* 8.1*   < > 8.5* 9.0 8.4*   MG 1.10* 1.30*   < > 1.40* 1.60 2.10   ALBUMIN 2.8* 2.6*  --   --   --  3.2*    < > = values in this interval not displayed.     Microbiology Results (last 7 days)       Procedure Component Value Units Date/Time    Blood Culture [[id number]]  (Normal) Collected: 11/30/24 2007    Order Status: Completed Specimen: Blood, Venous Updated: 12/05/24 2100     Blood Culture No Growth at 5 days    Blood Culture [[id number]]  (Normal) Collected: 11/30/24 2007    Order Status: Completed Specimen: Blood, Venous Updated: 12/05/24 2100     Blood Culture No Growth at 5 days             See below for Radiology    Intake/Output:  Intake/Output Summary (Last 24 hours) at 12/9/2024 1653  Last data filed at 12/9/2024 1602  Gross per 24 hour   Intake 280 ml   Output 3350 ml   Net -3070 ml          Assessment/Plan:  Acute kidney injury with profound azotemia- POA- Resolved with 1 session of hemodialysis  Acute urinary retention - 12/4/24- s/p re insertion of Wade catheter --> Removed 12/9  Large AG profound metabolic acidosis- POA- resolved   C.diff infection and diarrhea- POA  Upper respiratory symptoms with sinus congestion and sinus pressure- POA-resolved  Suspected Aspiration Pneumonia- POA- completed antibiotic with Zosyn  Hyponatremia - POA- resolved   Hypotension due to severe volume depletion, POA- resolved   Alcohol abuse and dependency - now with refeeding syndrome  Substance abuse  Mood disorder, Major depression and Suicidal ideation- POA  PEC/ CEC status - POA  Hx- PAF, HTN, NICMO  Hyperkalemia         Oral vancomycin for C.diff infection and diarrhea- day 9 of 10  Questran added, diarrhea has resolved  S/P single  session hemodialysis on  12/1/24 to correct profound azotemia and severe AG metabolic acidosis.   Renal parameter currently normalized   IVF discontinued as of 12/4/24  Librium taper to once daily for 2 days and then stop   Thiamine 100 mg b.i.d.  Mag 2.1, cont Mag oxide 400 mg daily  Phos 2.4  Potassium 5.5, ordered Lokelma 10 g p.o. b.i.d. x2 doses  Keep Mag greater than 2 and phos greater than 3, potassium greater than 4  Continue other supportive treatment   Completed course of IV Zosyn for presumed aspiration pneumonitis  Antihistamines and decongestant for sinus symptoms   Wade catheter re inserted 12/4/24 for urinary retention.  Continue Flomax, Wade discontinued 12/09/2024   Urology on board, recommended increased ambulation, if unsuccessful attempt closer to discharge, will need to be discharged with Wade and patient to follow in clinic for voiding trial  PEC/ CEC continued   Psych initiated Sertraline and Seroquel, suggested inpatient psychiatric hospitalization when medically stable.   CM on board  Morning BMP ordered        VTE prophylaxis: Heparin s/c     Patient condition:  Fair     Anticipated discharge and Disposition:   Inpatient Psych placement once C diff treatment is completed per Lauren, and refeeding syndrome resolves      All diagnosis and differential diagnosis have been reviewed; assessment and plan has been documented; I have personally reviewed the labs and test results that are presently available; I have reviewed the patients medication list; I have reviewed the consulting providers response and recommendations. I have reviewed or attempted to review medical records based upon their availability    All of the patient's questions have been  addressed and answered. Patient's is agreeable to the above stated plan. I will continue to monitor closely and make adjustments to medical management as needed.    Portions of this note dictated using EMR integrated voice recognition software, and  may be subject to voice recognition errors not corrected at proofreading. Please contact writer for clarification if needed.   _____________________________________________________________________    Malnutrition Status:  Nutrition consulted. Most recent weight and BMI monitored-     Measurements:  Wt Readings from Last 1 Encounters:   12/08/24 96.6 kg (212 lb 15.4 oz)   Body mass index is 29.7 kg/m².    Patient has been screened and assessed by RD.    Malnutrition Type:  Context:    Level:      Malnutrition Characteristic Summary:       Interventions/Recommendations (treatment strategy):        Scheduled Med:   cetirizine  10 mg Oral Daily    [START ON 12/10/2024] chlordiazepoxide  25 mg Oral Daily    fluticasone propionate  2 spray Each Nostril BID    folic acid  1 mg Oral Daily    heparin (porcine)  5,000 Units Subcutaneous Q12H    magnesium oxide  400 mg Oral Daily    multivitamin  1 tablet Oral Daily    QUEtiapine  50 mg Oral QHS    sertraline  100 mg Oral Daily    sodium zirconium cyclosilicate  10 g Oral BID    tamsulosin  0.4 mg Oral Daily    thiamine  100 mg Oral BID    vancomycin  125 mg Oral Q6H      Continuous Infusions:     PRN Meds:  Current Facility-Administered Medications:     acetaminophen, 1,000 mg, Oral, Q6H PRN    aluminum-magnesium hydroxide-simethicone, 30 mL, Oral, QID PRN    bisacodyL, 10 mg, Rectal, Daily PRN    butalbital-acetaminophen-caffeine -40 mg, 1 tablet, Oral, Q4H PRN    dextrose 10%, 12.5 g, Intravenous, PRN    dextrose 10%, 25 g, Intravenous, PRN    glucagon (human recombinant), 1 mg, Intramuscular, PRN    glucose, 16 g, Oral, PRN    glucose, 24 g, Oral, PRN    heparin, porcine (PF), 5 mL, Intra-Catheter, PRN    melatonin, 6 mg, Oral, Nightly PRN    naloxone, 0.02 mg, Intravenous, PRN    ondansetron, 4 mg, Intravenous, Q4H PRN    prochlorperazine, 5 mg, Intravenous, Q6H PRN    sodium chloride 0.9%, 10 mL, Intravenous, PRN         Luis Tristan MD  Department of Hospital  Medicine   [facility]   12/09/2024

## 2024-12-09 NOTE — PLAN OF CARE
Called [facility], spoke to Nelida to inform pt is ready for inpt psych placement. I also texted Cherie with [facility] for update on placement. Awaiting response from Cherie.

## 2024-12-09 NOTE — PLAN OF CARE
Cherie with [facility] messaged pt accepted to [facility] if siegel can stay out. Pt's nurse Kathleen siegel. Cherie said she will call  tomorrow to see if pt is able to leave Siegel out for placement.

## 2024-12-09 NOTE — PROGRESS NOTES
"12/9/2024  Gabe Barnett   1965   [id number]        Psychiatry Progress Note       SUBJECTIVE:   Gabe Barnett is a 59-year-old male admitted under PEC at [facility] with a history of alcohol abuse, seizures, atrial fibrillation (previously on Eliquis, discontinued), hypertension, and non-ischemic cardiomyopathy (NICMO). He was brought to the ED by EMS after being found in a motel bathtub covered in feces and surrounded by empty alcohol bottles. The patient reports quitting alcohol one month ago but admits to a history of heavy drinking. Upon arrival at the ED, he was tremulous and agitated, which improved with Serax administration. He revealed that he entered the bathtub with the intention of breaking an alcohol bottle to "cut my vein," indicating recent suicidal ideation. He endorses feeling very depressed and has not taken any prescribed medications in several months. The patient also reports a history of vomiting and watery diarrhea over the past several months, though he is unsure if there has been any blood in his emesis or stool. He has been living in a hotel and experiencing significant functional and emotional decline.     Seen at the bedside with 1:1 sitter present. Awake, polite, and cooperative. Depressed mood with mood-congruent, restricted, and blunted affect. Passive suicidal ideations without plan or intent. Reports feelings of hopelessness and helplessness. Also endorses fatigue. Denies issues with sleep or appetite. No evidence of kalin or psychosis. Denies homicidal ideations or auditory/visual hallucinations.        Current Medications:   Scheduled Meds:    cetirizine  10 mg Oral Daily    chlordiazepoxide  25 mg Oral BID    fluticasone propionate  2 spray Each Nostril BID    folic acid  1 mg Oral Daily    heparin (porcine)  5,000 Units Subcutaneous Q12H    magnesium oxide  400 mg Oral Daily    multivitamin  1 tablet Oral Daily    QUEtiapine  50 mg Oral QHS    sertraline  100 mg Oral Daily    sodium " zirconium cyclosilicate  10 g Oral BID    tamsulosin  0.4 mg Oral Daily    thiamine  100 mg Oral BID    vancomycin  125 mg Oral Q6H      PRN Meds:   Current Facility-Administered Medications:     acetaminophen, 1,000 mg, Oral, Q6H PRN    aluminum-magnesium hydroxide-simethicone, 30 mL, Oral, QID PRN    bisacodyL, 10 mg, Rectal, Daily PRN    butalbital-acetaminophen-caffeine -40 mg, 1 tablet, Oral, Q4H PRN    dextrose 10%, 12.5 g, Intravenous, PRN    dextrose 10%, 25 g, Intravenous, PRN    glucagon (human recombinant), 1 mg, Intramuscular, PRN    glucose, 16 g, Oral, PRN    glucose, 24 g, Oral, PRN    heparin, porcine (PF), 5 mL, Intra-Catheter, PRN    melatonin, 6 mg, Oral, Nightly PRN    naloxone, 0.02 mg, Intravenous, PRN    ondansetron, 4 mg, Intravenous, Q4H PRN    prochlorperazine, 5 mg, Intravenous, Q6H PRN    sodium chloride 0.9%, 10 mL, Intravenous, PRN   Psychotherapeutics (From admission, onward)      Start     Stop Route Frequency Ordered    12/05/24 0900  sertraline tablet 100 mg         -- Oral Daily 12/04/24 1123    12/03/24 2100  chlordiazepoxide capsule 25 mg         -- Oral 2 times daily 12/03/24 0956    12/01/24 2100  QUEtiapine tablet 50 mg         -- Oral Nightly 12/01/24 1227            Allergies:   Review of patient's allergies indicates:   Allergen Reactions    Niacin Other (See Comments)     I get flushed and burn up        OBJECTIVE:   Vitals   Vitals:    12/09/24 0541   BP: 107/69   Pulse: 87   Resp: 18   Temp: 98 °F (36.7 °C)        Labs/Imaging/Studies:   Recent Results (from the past 36 hours)   Basic Metabolic Panel    Collection Time: 12/08/24  2:34 AM   Result Value Ref Range    Sodium 138 136 - 145 mmol/L    Potassium 5.9 (H) 3.5 - 5.1 mmol/L    Chloride 112 (H) 98 - 107 mmol/L    CO2 20 (L) 23 - 31 mmol/L    Glucose 85 82 - 115 mg/dL    Blood Urea Nitrogen 12.5 8.4 - 25.7 mg/dL    Creatinine 1.11 0.72 - 1.25 mg/dL    BUN/Creatinine Ratio 11     Calcium 9.0 8.8 - 10.0 mg/dL     Anion Gap 6.0 mEq/L    eGFR >60 mL/min/1.73/m2   Magnesium    Collection Time: 12/08/24  2:34 AM   Result Value Ref Range    Magnesium Level 1.60 1.60 - 2.60 mg/dL   POCT glucose    Collection Time: 12/08/24  4:03 AM   Result Value Ref Range    POCT Glucose 82 70 - 110 mg/dL   POCT glucose    Collection Time: 12/08/24  8:34 PM   Result Value Ref Range    POCT Glucose 91 70 - 110 mg/dL   CBC Without Differential    Collection Time: 12/09/24  5:39 AM   Result Value Ref Range    WBC 6.02 4.50 - 11.50 x10(3)/mcL    RBC 3.27 (L) 4.70 - 6.10 x10(6)/mcL    Hgb 9.5 (L) 14.0 - 18.0 g/dL    Hct 30.6 (L) 42.0 - 52.0 %    MCV 93.6 80.0 - 94.0 fL    MCH 29.1 27.0 - 31.0 pg    MCHC 31.0 (L) 33.0 - 36.0 g/dL    RDW 14.2 11.5 - 17.0 %    Platelet 253 130 - 400 x10(3)/mcL    MPV 9.3 7.4 - 10.4 fL    NRBC% 0.0 %   Renal Function Panel    Collection Time: 12/09/24  5:39 AM   Result Value Ref Range    Sodium 136 136 - 145 mmol/L    Potassium 5.5 (H) 3.5 - 5.1 mmol/L    Chloride 111 (H) 98 - 107 mmol/L    CO2 21 (L) 23 - 31 mmol/L    Glucose 84 82 - 115 mg/dL    Blood Urea Nitrogen 15.3 8.4 - 25.7 mg/dL    Creatinine 1.03 0.72 - 1.25 mg/dL    Calcium 8.4 (L) 8.8 - 10.0 mg/dL    Albumin 3.2 (L) 3.4 - 4.8 g/dL    Phosphorus Level 2.4 2.3 - 4.7 mg/dL    eGFR >60 mL/min/1.73/m2   Magnesium    Collection Time: 12/09/24  5:39 AM   Result Value Ref Range    Magnesium Level 2.10 1.60 - 2.60 mg/dL   POCT glucose    Collection Time: 12/09/24  6:39 AM   Result Value Ref Range    POCT Glucose 77 70 - 110 mg/dL        Psychiatric Mental Status Exam:  General Appearance: appears stated age, dressed in hospital garb, lying in bed, in no acute distress  Arousal: alert  Behavior: polite, under good behavioral control, poor eye contact  Movements and Motor Activity: no tics, no tremors, no akathisia, no dystonia, no evidence of tardive dyskinesia  Orientation: oriented to person, place, and time  Speech: normal rate, rhythm, volume, tone and pitch  Mood:  Depressed  Affect: mood-congruent,restricted, blunted  Thought Process: goal-directed  Associations: no loosening of associations  Thought Content and Perceptions: + passive suicidal ideation, no homicidal ideation, no hallucinations, no delusions  Recent and Remote Memory: grossly intact; per interview/observation with patient  Attention and Concentration: grossly intact; per interview/observation with patient  Fund of Knowledge: grossly intact; based on history, vocabulary, fund of knowledge, syntax, grammar, and content  Insight: adequate; based on understanding of severity of illness and HPI  Judgment: questionable; based on patient's behavior and HPI    ASSESSMENT/PLAN:   Problems Addressed/Diagnoses:  Major Depressive Disorder, Recurrent: Severe Without Psychotic Features (F33.2)   Alcohol Use Disorder (F10.10)    Plan:  Medication Management  Quetiapine 50mg PO QHS  Sertraline 100mg PO QD     Legal  Continue PEC and 1:1 sitter.     Disposition  Recommend inpatient psychiatric hospitalization when medically stable.     Psych will continue to follow        Yakov Back

## 2024-12-10 ENCOUNTER — HOSPITAL ENCOUNTER (INPATIENT)
Facility: HOSPITAL | Age: 59
LOS: 3 days | Discharge: PSYCHIATRIC OR SUBSTANCE USE FACILITY | DRG: 885 | End: 2024-12-13
Attending: PSYCHIATRY & NEUROLOGY | Admitting: PSYCHIATRY & NEUROLOGY
Payer: MEDICAID

## 2024-12-10 VITALS
TEMPERATURE: 98 F | SYSTOLIC BLOOD PRESSURE: 109 MMHG | HEIGHT: 71 IN | DIASTOLIC BLOOD PRESSURE: 74 MMHG | RESPIRATION RATE: 18 BRPM | BODY MASS INDEX: 29.81 KG/M2 | WEIGHT: 212.94 LBS | OXYGEN SATURATION: 98 % | HEART RATE: 103 BPM

## 2024-12-10 DIAGNOSIS — F32.A DEPRESSION: ICD-10-CM

## 2024-12-10 LAB
ANION GAP SERPL CALC-SCNC: 8 MEQ/L
BUN SERPL-MCNC: 20.7 MG/DL (ref 8.4–25.7)
CALCIUM SERPL-MCNC: 8.7 MG/DL (ref 8.8–10)
CHLORIDE SERPL-SCNC: 108 MMOL/L (ref 98–107)
CO2 SERPL-SCNC: 20 MMOL/L (ref 23–31)
CREAT SERPL-MCNC: 1.16 MG/DL (ref 0.72–1.25)
CREAT/UREA NIT SERPL: 18
GFR SERPLBLD CREATININE-BSD FMLA CKD-EPI: >60 ML/MIN/1.73/M2
GLUCOSE SERPL-MCNC: 81 MG/DL (ref 82–115)
POTASSIUM SERPL-SCNC: 4.9 MMOL/L (ref 3.5–5.1)
SODIUM SERPL-SCNC: 136 MMOL/L (ref 136–145)

## 2024-12-10 PROCEDURE — 25000003 PHARM REV CODE 250: Performed by: NURSE PRACTITIONER

## 2024-12-10 PROCEDURE — 25000003 PHARM REV CODE 250: Performed by: INTERNAL MEDICINE

## 2024-12-10 PROCEDURE — 12400001 HC PSYCH SEMI-PRIVATE ROOM

## 2024-12-10 PROCEDURE — 36415 COLL VENOUS BLD VENIPUNCTURE: CPT | Performed by: INTERNAL MEDICINE

## 2024-12-10 PROCEDURE — 25000003 PHARM REV CODE 250

## 2024-12-10 PROCEDURE — 80048 BASIC METABOLIC PNL TOTAL CA: CPT | Performed by: INTERNAL MEDICINE

## 2024-12-10 PROCEDURE — 63600175 PHARM REV CODE 636 W HCPCS: Performed by: INTERNAL MEDICINE

## 2024-12-10 RX ORDER — ONDANSETRON 4 MG/1
4 TABLET, ORALLY DISINTEGRATING ORAL EVERY 6 HOURS PRN
Status: DISCONTINUED | OUTPATIENT
Start: 2024-12-10 | End: 2024-12-13 | Stop reason: HOSPADM

## 2024-12-10 RX ORDER — DIPHENHYDRAMINE HCL 50 MG
50 CAPSULE ORAL EVERY 4 HOURS PRN
Status: DISCONTINUED | OUTPATIENT
Start: 2024-12-10 | End: 2024-12-13 | Stop reason: HOSPADM

## 2024-12-10 RX ORDER — TRAZODONE HYDROCHLORIDE 100 MG/1
100 TABLET ORAL NIGHTLY PRN
Status: DISCONTINUED | OUTPATIENT
Start: 2024-12-10 | End: 2024-12-13 | Stop reason: HOSPADM

## 2024-12-10 RX ORDER — ASPIRIN 81 MG/1
81 TABLET ORAL DAILY
Qty: 30 TABLET | Refills: 11 | Status: ON HOLD | OUTPATIENT
Start: 2024-12-10 | End: 2024-12-12

## 2024-12-10 RX ORDER — ACETAMINOPHEN 325 MG/1
650 TABLET ORAL EVERY 6 HOURS PRN
Status: DISCONTINUED | OUTPATIENT
Start: 2024-12-10 | End: 2024-12-13 | Stop reason: HOSPADM

## 2024-12-10 RX ORDER — HALOPERIDOL 5 MG/ML
10 INJECTION INTRAMUSCULAR EVERY 4 HOURS PRN
Status: DISCONTINUED | OUTPATIENT
Start: 2024-12-10 | End: 2024-12-13 | Stop reason: HOSPADM

## 2024-12-10 RX ORDER — HALOPERIDOL 5 MG/1
10 TABLET ORAL EVERY 4 HOURS PRN
Status: DISCONTINUED | OUTPATIENT
Start: 2024-12-10 | End: 2024-12-13 | Stop reason: HOSPADM

## 2024-12-10 RX ORDER — ADHESIVE BANDAGE
30 BANDAGE TOPICAL DAILY PRN
Status: DISCONTINUED | OUTPATIENT
Start: 2024-12-10 | End: 2024-12-13 | Stop reason: HOSPADM

## 2024-12-10 RX ORDER — LANOLIN ALCOHOL/MO/W.PET/CERES
100 CREAM (GRAM) TOPICAL 2 TIMES DAILY
Qty: 60 TABLET | Refills: 0 | Status: ON HOLD | OUTPATIENT
Start: 2024-12-10 | End: 2024-12-12

## 2024-12-10 RX ORDER — SERTRALINE HYDROCHLORIDE 100 MG/1
100 TABLET, FILM COATED ORAL DAILY
Qty: 30 TABLET | Refills: 11 | Status: ON HOLD | OUTPATIENT
Start: 2024-12-10 | End: 2024-12-12

## 2024-12-10 RX ORDER — HYDROXYZINE HYDROCHLORIDE 50 MG/1
50 TABLET, FILM COATED ORAL EVERY 4 HOURS PRN
Status: DISCONTINUED | OUTPATIENT
Start: 2024-12-10 | End: 2024-12-13 | Stop reason: HOSPADM

## 2024-12-10 RX ORDER — TAMSULOSIN HYDROCHLORIDE 0.4 MG/1
0.4 CAPSULE ORAL DAILY
Qty: 30 CAPSULE | Refills: 11 | Status: ON HOLD | OUTPATIENT
Start: 2024-12-10 | End: 2024-12-12

## 2024-12-10 RX ORDER — QUETIAPINE FUMARATE 50 MG/1
50 TABLET, FILM COATED ORAL NIGHTLY
Qty: 30 TABLET | Refills: 11 | Status: ON HOLD | OUTPATIENT
Start: 2024-12-10 | End: 2024-12-12

## 2024-12-10 RX ORDER — LORAZEPAM 2 MG/ML
2 INJECTION INTRAMUSCULAR EVERY 4 HOURS PRN
Status: DISCONTINUED | OUTPATIENT
Start: 2024-12-10 | End: 2024-12-13 | Stop reason: HOSPADM

## 2024-12-10 RX ORDER — IBUPROFEN 200 MG
1 TABLET ORAL DAILY
Status: DISCONTINUED | OUTPATIENT
Start: 2024-12-11 | End: 2024-12-13 | Stop reason: HOSPADM

## 2024-12-10 RX ORDER — LORAZEPAM 1 MG/1
2 TABLET ORAL EVERY 4 HOURS PRN
Status: DISCONTINUED | OUTPATIENT
Start: 2024-12-10 | End: 2024-12-13 | Stop reason: HOSPADM

## 2024-12-10 RX ORDER — MUPIROCIN 20 MG/G
OINTMENT TOPICAL 2 TIMES DAILY
Status: DISCONTINUED | OUTPATIENT
Start: 2024-12-10 | End: 2024-12-10

## 2024-12-10 RX ORDER — DIPHENHYDRAMINE HYDROCHLORIDE 50 MG/ML
50 INJECTION INTRAMUSCULAR; INTRAVENOUS EVERY 4 HOURS PRN
Status: DISCONTINUED | OUTPATIENT
Start: 2024-12-10 | End: 2024-12-13 | Stop reason: HOSPADM

## 2024-12-10 RX ORDER — ALUMINUM HYDROXIDE, MAGNESIUM HYDROXIDE, AND SIMETHICONE 1200; 120; 1200 MG/30ML; MG/30ML; MG/30ML
30 SUSPENSION ORAL EVERY 6 HOURS PRN
Status: DISCONTINUED | OUTPATIENT
Start: 2024-12-10 | End: 2024-12-13 | Stop reason: HOSPADM

## 2024-12-10 RX ADMIN — THIAMINE HCL TAB 100 MG 100 MG: 100 TAB at 09:12

## 2024-12-10 RX ADMIN — THERA TABS 1 TABLET: TAB at 09:12

## 2024-12-10 RX ADMIN — TAMSULOSIN HYDROCHLORIDE 0.4 MG: 0.4 CAPSULE ORAL at 09:12

## 2024-12-10 RX ADMIN — VANCOMYCIN HYDROCHLORIDE 125 MG: 125 CAPSULE ORAL at 12:12

## 2024-12-10 RX ADMIN — FOLIC ACID 1 MG: 1 TABLET ORAL at 09:12

## 2024-12-10 RX ADMIN — CHLORDIAZEPOXIDE HYDROCHLORIDE 25 MG: 25 CAPSULE ORAL at 09:12

## 2024-12-10 RX ADMIN — CETIRIZINE HYDROCHLORIDE 10 MG: 10 TABLET, FILM COATED ORAL at 09:12

## 2024-12-10 RX ADMIN — VANCOMYCIN HYDROCHLORIDE 125 MG: 125 CAPSULE ORAL at 06:12

## 2024-12-10 RX ADMIN — SERTRALINE HYDROCHLORIDE 100 MG: 50 TABLET ORAL at 09:12

## 2024-12-10 RX ADMIN — FLUTICASONE PROPIONATE 100 MCG: 50 SPRAY, METERED NASAL at 09:12

## 2024-12-10 RX ADMIN — HEPARIN SODIUM 5000 UNITS: 5000 INJECTION, SOLUTION INTRAVENOUS; SUBCUTANEOUS at 09:12

## 2024-12-10 RX ADMIN — Medication 400 MG: at 09:12

## 2024-12-10 NOTE — PROGRESS NOTES
"12/10/2024  Gabe Barnett   1965   [id number]        Psychiatry Progress Note         SUBJECTIVE:   Gabe Barnett is a 59-year-old male admitted under PEC at [facility] with a history of alcohol abuse, seizures, atrial fibrillation (previously on Eliquis, discontinued), hypertension, and non-ischemic cardiomyopathy (NICMO). He was brought to the ED by EMS after being found in a motel bathtub covered in feces and surrounded by empty alcohol bottles. The patient reports quitting alcohol one month ago but admits to a history of heavy drinking. Upon arrival at the ED, he was tremulous and agitated, which improved with Serax administration. He revealed that he entered the bathtub with the intention of breaking an alcohol bottle to "cut my vein," indicating recent suicidal ideation. He endorses feeling very depressed and has not taken any prescribed medications in several months. The patient also reports a history of vomiting and watery diarrhea over the past several months, though he is unsure if there has been any blood in his emesis or stool. He has been living in a hotel and experiencing significant functional and emotional decline.     Seen at the bedside with 1:1 sitter present. Currently awake, alert, pleasant, and cooperative. Appears to be planned for transfer to [facility] today. Reports depressed mood with feelings of hopelessness and helplessness. Also endorses intermittent feelings of anxiety related to pending placement. Displays dysthymic, mood-congruent, and restricted affect. Reports passive suicidal ideations without a plan or intent. No evidence of psychosis and denies auditory/visual hallucinations or paranoia.        Current Medications:   Scheduled Meds:    cetirizine  10 mg Oral Daily    chlordiazepoxide  25 mg Oral Daily    fluticasone propionate  2 spray Each Nostril BID    folic acid  1 mg Oral Daily    heparin (porcine)  5,000 Units Subcutaneous Q12H    magnesium oxide  400 mg Oral Daily    multivitamin  1 " tablet Oral Daily    QUEtiapine  50 mg Oral QHS    sertraline  100 mg Oral Daily    tamsulosin  0.4 mg Oral Daily    thiamine  100 mg Oral BID    vancomycin  125 mg Oral Q6H      PRN Meds:   Current Facility-Administered Medications:     acetaminophen, 1,000 mg, Oral, Q6H PRN    aluminum-magnesium hydroxide-simethicone, 30 mL, Oral, QID PRN    bisacodyL, 10 mg, Rectal, Daily PRN    butalbital-acetaminophen-caffeine -40 mg, 1 tablet, Oral, Q4H PRN    dextrose 10%, 12.5 g, Intravenous, PRN    dextrose 10%, 25 g, Intravenous, PRN    glucagon (human recombinant), 1 mg, Intramuscular, PRN    glucose, 16 g, Oral, PRN    glucose, 24 g, Oral, PRN    heparin, porcine (PF), 5 mL, Intra-Catheter, PRN    melatonin, 6 mg, Oral, Nightly PRN    naloxone, 0.02 mg, Intravenous, PRN    ondansetron, 4 mg, Intravenous, Q4H PRN    prochlorperazine, 5 mg, Intravenous, Q6H PRN    sodium chloride 0.9%, 10 mL, Intravenous, PRN   Psychotherapeutics (From admission, onward)      Start     Stop Route Frequency Ordered    12/10/24 0900  chlordiazepoxide capsule 25 mg         12/12/24 0859 Oral Daily 12/09/24 1649    12/05/24 0900  sertraline tablet 100 mg         -- Oral Daily 12/04/24 1123    12/01/24 2100  QUEtiapine tablet 50 mg         -- Oral Nightly 12/01/24 1227            Allergies:   Review of patient's allergies indicates:   Allergen Reactions    Niacin Other (See Comments)     I get flushed and burn up        OBJECTIVE:   Vitals   Vitals:    12/10/24 1130   BP: 107/68   Pulse: 86   Resp: 18   Temp: 97.6 °F (36.4 °C)        Labs/Imaging/Studies:   Recent Results (from the past 36 hours)   CBC Without Differential    Collection Time: 12/09/24  5:39 AM   Result Value Ref Range    WBC 6.02 4.50 - 11.50 x10(3)/mcL    RBC 3.27 (L) 4.70 - 6.10 x10(6)/mcL    Hgb 9.5 (L) 14.0 - 18.0 g/dL    Hct 30.6 (L) 42.0 - 52.0 %    MCV 93.6 80.0 - 94.0 fL    MCH 29.1 27.0 - 31.0 pg    MCHC 31.0 (L) 33.0 - 36.0 g/dL    RDW 14.2 11.5 - 17.0 %     Platelet 253 130 - 400 x10(3)/mcL    MPV 9.3 7.4 - 10.4 fL    NRBC% 0.0 %   Renal Function Panel    Collection Time: 12/09/24  5:39 AM   Result Value Ref Range    Sodium 136 136 - 145 mmol/L    Potassium 5.5 (H) 3.5 - 5.1 mmol/L    Chloride 111 (H) 98 - 107 mmol/L    CO2 21 (L) 23 - 31 mmol/L    Glucose 84 82 - 115 mg/dL    Blood Urea Nitrogen 15.3 8.4 - 25.7 mg/dL    Creatinine 1.03 0.72 - 1.25 mg/dL    Calcium 8.4 (L) 8.8 - 10.0 mg/dL    Albumin 3.2 (L) 3.4 - 4.8 g/dL    Phosphorus Level 2.4 2.3 - 4.7 mg/dL    eGFR >60 mL/min/1.73/m2   Magnesium    Collection Time: 12/09/24  5:39 AM   Result Value Ref Range    Magnesium Level 2.10 1.60 - 2.60 mg/dL   POCT glucose    Collection Time: 12/09/24  6:39 AM   Result Value Ref Range    POCT Glucose 77 70 - 110 mg/dL   Basic Metabolic Panel    Collection Time: 12/10/24  3:40 AM   Result Value Ref Range    Sodium 136 136 - 145 mmol/L    Potassium 4.9 3.5 - 5.1 mmol/L    Chloride 108 (H) 98 - 107 mmol/L    CO2 20 (L) 23 - 31 mmol/L    Glucose 81 (L) 82 - 115 mg/dL    Blood Urea Nitrogen 20.7 8.4 - 25.7 mg/dL    Creatinine 1.16 0.72 - 1.25 mg/dL    BUN/Creatinine Ratio 18     Calcium 8.7 (L) 8.8 - 10.0 mg/dL    Anion Gap 8.0 mEq/L    eGFR >60 mL/min/1.73/m2          Psychiatric Mental Status Exam:  General Appearance: appears stated age, dressed in hospital garb, lying in bed, in no acute distress  Arousal: alert  Behavior: polite, under good behavioral control, poor eye contact  Movements and Motor Activity: no tics, no tremors, no akathisia, no dystonia, no evidence of tardive dyskinesia  Orientation: oriented to person, place, and time  Speech: normal rate, rhythm, volume, tone and pitch  Mood: Depressed  Affect: mood-congruent,restricted, blunted  Thought Process: goal-directed  Associations: no loosening of associations  Thought Content and Perceptions: + passive suicidal ideation, no homicidal ideation, no hallucinations, no delusions  Recent and Remote Memory: grossly  intact; per interview/observation with patient  Attention and Concentration: grossly intact; per interview/observation with patient  Fund of Knowledge: grossly intact; based on history, vocabulary, fund of knowledge, syntax, grammar, and content  Insight: adequate; based on understanding of severity of illness and HPI  Judgment: questionable; based on patient's behavior and HPI       ASSESSMENT/PLAN:   Problems Addressed/Diagnoses:  Major Depressive Disorder, Recurrent: Severe Without Psychotic Features (F33.2)   Alcohol Use Disorder (F10.10)     Plan:  Medication Management  Quetiapine 50mg PO QHS  Sertraline 100mg PO QD     Legal  Continue PEC and 1:1 sitter.     Disposition  Recommend inpatient psychiatric hospitalization when medically stable.     Psych will continue to follow        Yakov Back

## 2024-12-10 NOTE — NURSING
Attempted to call report to [facility]. Receiving nurse requesting full history and stating they have not received paperwork regarding pts acceptance by physician. Attempted to report off pertinent history throughout pts admit. Unable to provide efficient report due to several voices in background asking multiple questions at a time. Stated I will reach out to  and have them call facility. Reached out to Bud MUNOZ RN,  who placed a call to nursing supervisor. CM placed call back to primary nurse and stated that she made receiving facility aware that physician has accepted pt and they need to take report. The facility liaison then placed call to primary nurse and stated that she spoke to receiving  and gave go ahead to send patient now. [facility]. Report sheet sent with DC paperwork.

## 2024-12-10 NOTE — DISCHARGE SUMMARY
"[facility] Medicine Discharge Summary    Admit Date: 11/30/2024  Discharge Date and Time: 12/10/90642:15 AM  Admitting Physician: Hospitalist team   Discharging Physician: Luis Ch MD.  Primary Care Physician: Vivienne, Primary Doctor      Discharge Diagnoses:  Acute kidney injury with profound azotemia- POA- Resolved with 1 session of hemodialysis  Acute urinary retention - 12/4/24- s/p re insertion of Wade catheter --> Removed 12/9  Large AG profound metabolic acidosis- POA- resolved   C.diff infection and diarrhea- POA  Upper respiratory symptoms with sinus congestion and sinus pressure- POA-resolved  Suspected Aspiration Pneumonia- POA- completed antibiotic with Zosyn  Hyponatremia - POA- resolved   Hypotension due to severe volume depletion, POA- resolved   Alcohol abuse and dependency - now with refeeding syndrome  Substance abuse  Mood disorder, Major depression and Suicidal ideation- POA  PEC/ CEC status - POA  Hx- PAF, HTN, NICMO  Hyperkalemia    Hospital Course:   59 yr old male with PMHx of Alcohol abuse, Seizures (I would assume it was alcohol withdrawal seizure), atrial fibrillation (previously on Eliquis but quit taking), HTN, and NICMO now recovered with LVEF 65-70% on echo 11/30/24 was brought to the ED by  EMS after he was found in a Motel in tub covered in feces surrounded by empty alcohol bottles. Patient reports he quit drinking one month ago. In the ED he was tremulous and agitated on arrival that had  improved with Serax. Has been living in a motel. Reports he got in the tub with intention of breaking one of the alcohol bottles and "cutting my vein". Admits to feeling very depressed. Has not taken any of his medications in several months. Reports several month history of vomiting and watery diarrhea. Unsure if he's noticed any blood in emesis or stool.   VS on arrival: T 95.7, P 98, R 18, BP 96/46, Sats 98% on room air. Initial labs: WBC 9.99, Hgb 11.1, " Plt 246, Na 133, K 5.2, Cl 87, bicarb 9, , creatinine 16.67, glucose 131, AST 8, ALT 10, lipase 161. Lactic acid 2.2, CPK, ETOH, TSH, ammonia, Troponin normal.  CT abdomen and pelvis shows patchy ground-glass in the lung bases suggestive of infectious or inflammatory process.  No acute process identified in the abdomen or pelvis. Retroperitoneal ultrasound unremarkable. Pt was admitted to  service . Nephrology was consulted to assist. Pt was placed under PEC due to suicidal ideation and attempt. Psych consulted.   Pt was started on Calcium gluconate, D10 with IV insulin and Lokelma per hyperkalemia protocol. Aggressive IV hydration with bicarb drip, high dose thiamine, Librium taper, folic acid and multivitamin. Blood cultures were drawn and Zosyn initiated for presumed aspiration pneumonia. Stool was collected for C.diff toxins and came back positive. Initiated on oral Vancomycin. Pt remained with profound metabolic acidosis with large AG and azotemia. Nephrology consulted General Surgery for temporary HD catheter and Hemodialysis initiated. Pt underwent single session dialysis on 12/1/24. UOP increased. No further dialysis needed. Renal function cont to recover and normalized as of 12/4/24. Pt now became more awake, alert and interactive but with flat affect. Diarrhea persisted. Questran added to mitigate stool frequency. Diarrhea improving. Wade catheter removed on 12/3/24. However unable to void spontaneously with >800 ml retention. Wade reinserted 12/4/24. Flomax added. Urology consult requested. PEC continued. Psych initiated Sertraline and Seroquel and inpatient psychiatric hospitalization when medically stable.     Patient accepted to psychiatric facility on 12/10.  He remains off all BP meds and vitals stable.  Will continue to hold for now.  Patient [id number] no longer taking Eliquis for A.fib.  Will put him on baby ASA for now as prophylaxis but recommend follow up with cardiology outpatient to  "further discuss risk/benefit of anticoagulation.  He is at high risk of bleed/fall due to alochol abuse so likely poor cnadidate for full OAC.  Defer detox meds to psych facility.  Continue vitamin supplementation.     Vitals:  Blood pressure 112/62, pulse 90, temperature 98 °F (36.7 °C), temperature source Oral, resp. rate 20, height 5' 11" (1.803 m), weight 96.6 kg (212 lb 15.4 oz), SpO2 97%..    Physical Exam:  Awake, Alert, Oriented x 3, No new focal Neurologic deficit, Normal Affect  NC/AT, PERRLA, EOMI  Supple neck, no JVD, No cervical lymphadenopathy  Symmetrical chest, Good air entry bilaterally. No rhonchi, wheezes, crackles appreciated  RRR, No gallop, rub or murmur  +ve Bowel sounds x4, Abd soft and non tender, no rebound, guarding or rigidity  No Cyanosis, cludding or edema, No new rash or bruises    Procedures Performed: No admission procedures for hospital encounter.     Significant Diagnostic Studies: See Full reports for all details  No results displayed because visit has over 200 results.           Microbiology Results (last 7 days)       Procedure Component Value Units Date/Time    Blood Culture [[id number]]  (Normal) Collected: 11/30/24 2007    Order Status: Completed Specimen: Blood, Venous Updated: 12/05/24 2100     Blood Culture No Growth at 5 days    Blood Culture [[id number]]  (Normal) Collected: 11/30/24 2007    Order Status: Completed Specimen: Blood, Venous Updated: 12/05/24 2100     Blood Culture No Growth at 5 days             X-Ray Chest 1 View    Result Date: 12/1/2024  EXAMINATION: XR CHEST 1 VIEW CLINICAL HISTORY: Noninfective gastroenteritis and colitis, unspecified s/p HD line placement; TECHNIQUE: AP chest COMPARISON: Chest x-ray dated 11/30/2024 FINDINGS: Right IJ central line has its tip over the superior vena cava.  The heart is normal in size.  There is left basilar subsegmental atelectasis.  There is no pleural effusion or visible pneumothorax.     Right IJ central line with " tip over the superior vena cava. Electronically signed by: Iza Hughes Date:    12/01/2024 Time:    13:01    Echo    Result Date: 12/1/2024    Left Ventricle: The left ventricle is normal in size. Increased wall thickness. Normal wall motion. There is normal systolic function with a visually estimated ejection fraction of 65 - 70%. There is normal diastolic function.   Right Ventricle: Normal right ventricular cavity size. Systolic function is normal.   Right Atrium: Right atrium is mildly dilated.   Aortic Valve: There is no stenosis. Aortic valve peak velocity is 1.5 m/s. Mean gradient is 5.0 mmHg. There is no significant regurgitation.   Mitral Valve: There is trace regurgitation.   Tricuspid Valve: There is mild regurgitation.   IVC/SVC: IVC was not well visualized due to poor acoustic window.   Pericardium: There is no pericardial effusion.     X-Ray Chest 1 View    Result Date: 12/1/2024  EXAMINATION: XR CHEST 1 VIEW CLINICAL HISTORY: suspected aspiration pneumonia; TECHNIQUE: AP chest COMPARISON: Chest x-ray dated 09/23/2024 FINDINGS: The heart is normal in size.  There are minimal patchy opacities in the left lung base.  There is no pleural effusion or visible pneumothorax.     Minimal patchy opacities in the left lung base, may be infectious or inflammatory. Electronically signed by: Iza Hughes Date:    12/01/2024 Time:    11:36    US Retroperitoneal Complete    Result Date: 12/1/2024  EXAMINATION: US RETROPERITONEAL COMPLETE CLINICAL HISTORY: acute renal failure; TECHNIQUE: Ultrasound evaluation of the kidneys, region of the ureters, and urinary bladder. COMPARISON: CT 11/30/2024 FINDINGS: No hydronephrosis. Normal renal echogenicity. Calculated bladder volume 236 mL. Poor visualization of the IVC.  Abdominal aorta obscured. Measurements: - Right kidney: 11.0 cm in length - Left kidney: 12.4 cm in length     No hydronephrosis. Electronically signed by: Reinaldo Montanez Date:    12/01/2024  Time:    11:33    CT Head Without Contrast    Result Date: 12/1/2024  EXAMINATION: CT HEAD WITHOUT CONTRAST CLINICAL HISTORY: Mental status change, unknown cause;unwitnessed syncope, on anticoagulation. TECHNIQUE: Low dose axial images were obtained through the head.  Coronal and sagittal reformations were also performed. Contrast was not administered.  Dose reduction techniques including automatic exposure control (AEC) were utilized. Dose (DLP): 993 mGycm COMPARISON: None. FINDINGS: INTRACRANIAL: Brain parenchyma demonstrates normal attenuation and configuration.  No acute intracranial hemorrhage.  No hydrocephalus.  No intracranial mass effect. SINUSES: Patchy opacity along the floor the left maxillary sinus.  Trace bilateral ethmoid and right maxillary sinus mucosal thickening.  Mastoid air cells are clear. SKULL/SCALP: Visualized osseous structures are normal. ORBITS: Visualized orbits are normal.     [place] concordant.  No acute intracranial abnormality. Electronically signed by: Jeff Brady MD Date:    12/01/2024 Time:    08:23    CT Abdomen Pelvis  Without Contrast    Result Date: 11/30/2024  EXAMINATION: CT ABDOMEN PELVIS WITHOUT CONTRAST CLINICAL HISTORY: Abdominal pain, acute, nonlocalized; TECHNIQUE: CT imaging of the abdomen and pelvis without intravenous contrast. Axial, coronal and sagittal reformatted images reviewed. Dose length product is 695 mGycm. Automatic exposure control, adjustment of mA/kV or iterative reconstruction technique used to limit radiation dose. COMPARISON: No relevant comparison studies available at the time of dictation. FINDINGS: Assessment of the visceral organs and vasculature is limited by the lack of IV contrast. Liver/biliary: No concerning hepatic findings. No radiodense gallstone or biliary dilatation appreciated. Pancreas: Normal. Spleen: Normal. Adrenals: Normal. Genitourinary: No hydronephrosis or defined urinary stone. Bladder within normal limits.  Stomach/bowel: No bowel obstruction. Normal appendix. No discernible bowel inflammation. Lymph nodes and peritoneum: No pathologically enlarged lymph node identified with noncontrast technique. No ascites or free air. Vasculature: Aortoiliac atherosclerosis. Abdominal wall: Normal. Lung bases: Patchy ground-glass in both lung bases. Bones: No acute osseous findings.     No acute process identified in the abdomen or pelvis. Patchy ground-glass in the lung bases suggestive of infectious or inflammatory process. Electronically signed by: Reinaldo Montanez Date:    11/30/2024 Time:    16:14  - pulls last radiology orders        Medication List        START taking these medications      aspirin 81 MG EC tablet  Commonly known as: ECOTRIN  Take 1 tablet (81 mg total) by mouth once daily.     multivitamin Tab  Take 1 tablet by mouth once daily.     QUEtiapine 50 MG tablet  Commonly known as: SEROQUEL  Take 1 tablet (50 mg total) by mouth every evening.     sertraline 100 MG tablet  Commonly known as: ZOLOFT  Take 1 tablet (100 mg total) by mouth once daily.     tamsulosin 0.4 mg Cap  Commonly known as: FLOMAX  Take 1 capsule (0.4 mg total) by mouth once daily.     thiamine 100 MG tablet  Take 1 tablet (100 mg total) by mouth 2 (two) times a day.            CONTINUE taking these medications      atorvastatin 40 MG tablet  Commonly known as: LIPITOR     folic acid 1 MG tablet  Commonly known as: FOLVITE  Take 1 tablet (1 mg total) by mouth once daily.     omeprazole 20 MG capsule  Commonly known as: PRILOSEC            STOP taking these medications      apixaban 5 mg Tab  Commonly known as: ELIQUIS     chlordiazepoxide 25 MG Cap  Commonly known as: LIBRIUM     hydroCHLOROthiazide 12.5 MG Tab  Commonly known as: HYDRODIURIL     lisinopriL 10 MG tablet     metoprolol succinate 25 MG 24 hr tablet  Commonly known as: TOPROL-XL               Where to Get Your Medications        These medications were sent to [facility] and  [address] [address]      Phone: [phone]   aspirin 81 MG EC tablet  multivitamin Tab  QUEtiapine 50 MG tablet  sertraline 100 MG tablet  tamsulosin 0.4 mg Cap  thiamine 100 MG tablet          Explained in detail to the patient about the discharge plan, medications, and follow-up visits. Pt understands and agrees with the treatment plan  Discharged Condition: stable  Diet: cardiac  Disposition: [facility], psych facility    Medications Per DC med rec  Activities as tolerated  Follow up with your PCP in 2 wks   For further questions contact hospitalist office    Discharge time 33 minutes    For worsening symptoms, chest pain, shortness of breath, increased abdominal pain, high grade fever, stroke or stroke like symptoms, immediately go to the nearest Emergency Room or call 911 as soon as possible.        Luis Padilla M.D, on 12/10/2024. at 6:15 AM.

## 2024-12-11 PROBLEM — E78.2 MIXED HYPERLIPIDEMIA: Status: ACTIVE | Noted: 2024-12-11

## 2024-12-11 PROBLEM — I48.91 ATRIAL FIBRILLATION: Status: ACTIVE | Noted: 2024-12-11

## 2024-12-11 PROBLEM — E87.5 HYPERKALEMIA: Status: ACTIVE | Noted: 2024-12-11

## 2024-12-11 PROBLEM — R00.0 TACHYCARDIA: Status: ACTIVE | Noted: 2024-12-11

## 2024-12-11 LAB
ALBUMIN SERPL-MCNC: 3.7 G/DL (ref 3.4–4.8)
ALBUMIN/GLOB SERPL: 1.2 RATIO (ref 1.1–2)
ALP SERPL-CCNC: 104 UNIT/L (ref 40–150)
ALT SERPL-CCNC: 56 UNIT/L (ref 0–55)
ANION GAP SERPL CALC-SCNC: 8 MEQ/L
AST SERPL-CCNC: 28 UNIT/L (ref 5–34)
BASOPHILS # BLD AUTO: 0.06 X10(3)/MCL
BASOPHILS NFR BLD AUTO: 1.1 %
BILIRUB SERPL-MCNC: 0.2 MG/DL
BUN SERPL-MCNC: 19.8 MG/DL (ref 8.4–25.7)
CALCIUM SERPL-MCNC: 9.5 MG/DL (ref 8.8–10)
CHLORIDE SERPL-SCNC: 110 MMOL/L (ref 98–107)
CHOLEST SERPL-MCNC: 230 MG/DL
CHOLEST/HDLC SERPL: 7 {RATIO} (ref 0–5)
CO2 SERPL-SCNC: 23 MMOL/L (ref 23–31)
CREAT SERPL-MCNC: 1.15 MG/DL (ref 0.72–1.25)
CREAT/UREA NIT SERPL: 17
EOSINOPHIL # BLD AUTO: 0.26 X10(3)/MCL (ref 0–0.9)
EOSINOPHIL NFR BLD AUTO: 4.7 %
ERYTHROCYTE [DISTWIDTH] IN BLOOD BY AUTOMATED COUNT: 14.8 % (ref 11.5–17)
EST. AVERAGE GLUCOSE BLD GHB EST-MCNC: 114 MG/DL
GFR SERPLBLD CREATININE-BSD FMLA CKD-EPI: >60 ML/MIN/1.73/M2
GLOBULIN SER-MCNC: 3.2 GM/DL (ref 2.4–3.5)
GLUCOSE SERPL-MCNC: 83 MG/DL (ref 82–115)
HBA1C MFR BLD: 5.6 %
HCT VFR BLD AUTO: 33.6 % (ref 42–52)
HDLC SERPL-MCNC: 32 MG/DL (ref 35–60)
HGB BLD-MCNC: 10.7 G/DL (ref 14–18)
IMM GRANULOCYTES # BLD AUTO: 0.02 X10(3)/MCL (ref 0–0.04)
IMM GRANULOCYTES NFR BLD AUTO: 0.4 %
LDLC SERPL CALC-MCNC: 153 MG/DL (ref 50–140)
LYMPHOCYTES # BLD AUTO: 2.08 X10(3)/MCL (ref 0.6–4.6)
LYMPHOCYTES NFR BLD AUTO: 37.5 %
MCH RBC QN AUTO: 29.6 PG (ref 27–31)
MCHC RBC AUTO-ENTMCNC: 31.8 G/DL (ref 33–36)
MCV RBC AUTO: 92.8 FL (ref 80–94)
MONOCYTES # BLD AUTO: 0.31 X10(3)/MCL (ref 0.1–1.3)
MONOCYTES NFR BLD AUTO: 5.6 %
NEUTROPHILS # BLD AUTO: 2.82 X10(3)/MCL (ref 2.1–9.2)
NEUTROPHILS NFR BLD AUTO: 50.7 %
NRBC BLD AUTO-RTO: 0 %
PLATELET # BLD AUTO: 332 X10(3)/MCL (ref 130–400)
PMV BLD AUTO: 9.6 FL (ref 7.4–10.4)
POTASSIUM SERPL-SCNC: 5.4 MMOL/L (ref 3.5–5.1)
PROT SERPL-MCNC: 6.9 GM/DL (ref 5.8–7.6)
RBC # BLD AUTO: 3.62 X10(6)/MCL (ref 4.7–6.1)
SODIUM SERPL-SCNC: 141 MMOL/L (ref 136–145)
T PALLIDUM AB SER QL: NONREACTIVE
TRIGL SERPL-MCNC: 225 MG/DL (ref 34–140)
TSH SERPL-ACNC: 2.27 UIU/ML (ref 0.35–4.94)
VLDLC SERPL CALC-MCNC: 45 MG/DL
WBC # BLD AUTO: 5.55 X10(3)/MCL (ref 4.5–11.5)

## 2024-12-11 PROCEDURE — 12400001 HC PSYCH SEMI-PRIVATE ROOM

## 2024-12-11 PROCEDURE — 80061 LIPID PANEL: CPT | Performed by: PSYCHIATRY & NEUROLOGY

## 2024-12-11 PROCEDURE — 25000003 PHARM REV CODE 250: Performed by: FAMILY MEDICINE

## 2024-12-11 PROCEDURE — 83036 HEMOGLOBIN GLYCOSYLATED A1C: CPT | Performed by: PSYCHIATRY & NEUROLOGY

## 2024-12-11 PROCEDURE — 86780 TREPONEMA PALLIDUM: CPT | Performed by: PSYCHIATRY & NEUROLOGY

## 2024-12-11 PROCEDURE — 80053 COMPREHEN METABOLIC PANEL: CPT | Performed by: PSYCHIATRY & NEUROLOGY

## 2024-12-11 PROCEDURE — 85025 COMPLETE CBC W/AUTO DIFF WBC: CPT | Performed by: PSYCHIATRY & NEUROLOGY

## 2024-12-11 PROCEDURE — 84443 ASSAY THYROID STIM HORMONE: CPT | Performed by: PSYCHIATRY & NEUROLOGY

## 2024-12-11 PROCEDURE — 25000003 PHARM REV CODE 250: Performed by: PSYCHIATRY & NEUROLOGY

## 2024-12-11 RX ORDER — QUETIAPINE FUMARATE 25 MG/1
50 TABLET, FILM COATED ORAL NIGHTLY
Status: DISCONTINUED | OUTPATIENT
Start: 2024-12-11 | End: 2024-12-13 | Stop reason: HOSPADM

## 2024-12-11 RX ORDER — SERTRALINE HYDROCHLORIDE 50 MG/1
100 TABLET, FILM COATED ORAL DAILY
Status: DISCONTINUED | OUTPATIENT
Start: 2024-12-11 | End: 2024-12-13 | Stop reason: HOSPADM

## 2024-12-11 RX ORDER — FOLIC ACID 1 MG/1
1 TABLET ORAL DAILY
Status: DISCONTINUED | OUTPATIENT
Start: 2024-12-11 | End: 2024-12-13 | Stop reason: HOSPADM

## 2024-12-11 RX ORDER — ATORVASTATIN CALCIUM 10 MG/1
40 TABLET, FILM COATED ORAL DAILY
Status: DISCONTINUED | OUTPATIENT
Start: 2024-12-12 | End: 2024-12-13 | Stop reason: HOSPADM

## 2024-12-11 RX ORDER — ASPIRIN 81 MG/1
81 TABLET ORAL DAILY
Status: DISCONTINUED | OUTPATIENT
Start: 2024-12-12 | End: 2024-12-13 | Stop reason: HOSPADM

## 2024-12-11 RX ORDER — CETIRIZINE HYDROCHLORIDE 10 MG/1
10 TABLET ORAL DAILY
Status: DISCONTINUED | OUTPATIENT
Start: 2024-12-11 | End: 2024-12-13 | Stop reason: HOSPADM

## 2024-12-11 RX ORDER — THIAMINE HCL 100 MG
100 TABLET ORAL DAILY
Status: DISCONTINUED | OUTPATIENT
Start: 2024-12-11 | End: 2024-12-13 | Stop reason: HOSPADM

## 2024-12-11 RX ORDER — METOPROLOL SUCCINATE 25 MG/1
25 TABLET, EXTENDED RELEASE ORAL 2 TIMES DAILY
Status: DISCONTINUED | OUTPATIENT
Start: 2024-12-11 | End: 2024-12-13 | Stop reason: HOSPADM

## 2024-12-11 RX ORDER — FLUTICASONE PROPIONATE 50 MCG
2 SPRAY, SUSPENSION (ML) NASAL DAILY
Status: DISCONTINUED | OUTPATIENT
Start: 2024-12-11 | End: 2024-12-13 | Stop reason: HOSPADM

## 2024-12-11 RX ADMIN — SERTRALINE HYDROCHLORIDE 100 MG: 50 TABLET ORAL at 10:12

## 2024-12-11 RX ADMIN — THIAMINE HCL TAB 100 MG 100 MG: 100 TAB at 10:12

## 2024-12-11 RX ADMIN — FOLIC ACID 1 MG: 1 TABLET ORAL at 10:12

## 2024-12-11 RX ADMIN — THERA TABS 1 TABLET: TAB at 10:12

## 2024-12-11 RX ADMIN — CETIRIZINE HYDROCHLORIDE 10 MG: 10 TABLET, FILM COATED ORAL at 10:12

## 2024-12-11 RX ADMIN — METOPROLOL SUCCINATE 25 MG: 25 TABLET, EXTENDED RELEASE ORAL at 08:12

## 2024-12-11 RX ADMIN — QUETIAPINE FUMARATE 50 MG: 25 TABLET ORAL at 08:12

## 2024-12-11 NOTE — PROGRESS NOTES
12/11/24 1500   [facility] Therapy   Group Name Therapeutic Recreation   Specific Interventions Skilled Activity Leisure Education and Awareness   Participation Level Active;Supportive;Appropriate;Attentive;Sharing   Participation Quality Cooperative;Social   Insight/Motivation Limited;Applies New Skills   Affect/Mood Display Appropriate;Bright   Cognition Oriented;Alert   Psychomotor WNL

## 2024-12-11 NOTE — HPI
I am suicidal. I have been badly depressed lately. I have been crying and feeling hopeless, useless and worthless. I have nothing and nobody and homeless.  I used a lot of hard liquor. I drink a 1.75 liter of bourbon in less than two days.  I drink until I pass out. I get withdrawal and shakes when I stop drinking.  I think I may had a seizure before when I stopped drinking. I though about suicide by going in the bathtub with an empty bottle and cut myself.

## 2024-12-11 NOTE — H&P
"12/11/2024  Gabe Barnett   1965   [id number]            Psychiatry Inpatient Admission Note    Date of Admission: 12/10/2024  8:09 PM    Current Legal Status: Physician's Emergency Certificate    Chief Complaint: "I've just been blah"    SUBJECTIVE:   History of Present Illness:   Gabe Barnett is a 59 y.o. male placed under a PEC at [facility] due to reported suicidal ideations.    Patient with a history of alcohol abuse, seizures, atrial fibrillation (previously on Eliquis, discontinued), hypertension, and non-ischemic cardiomyopathy (NICMO). He was brought to the ED by EMS after being found in a motel bathtub covered in feces and surrounded by empty alcohol bottles. The patient reports quitting alcohol one month ago but admits to a history of heavy drinking. Upon arrival at the ED, he was tremulous and agitated, which improved with Serax administration. He revealed that he entered the bathtub with the intention of breaking an alcohol bottle to "cut my vein," indicating recent suicidal ideation.     Has been homeless for years (was a  and had been living in his truck for years).  At times, he would live with his sister in [place].  He had 4 days off here and [place].  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    Currently, his mood is "blah."  Has been in the medical hospital since 11/30/24.  Has not spoken to family but encouraged to reach out to them.  Will admit to inpatient unit for medication management and symptom monitoring.       UDS: (+)benzodiazepines  Blood alcohol: <10      Past Psychiatric History:   Previous Psychiatric Hospitalizations: inpatient in [place] in 2013   Previous Medication Trials: Yes  Previous Suicide Attempts: "Mostly by just drinking"   Outpatient psychiatrist: Denies    Current Medications:   Home Psychiatric Meds: None.  Started on Zoloft and Seroquel in the hospital    Past " "Medical/Surgical History:   No past medical history on file.  No past surgical history on file.      Family Psychiatric History:   Denies     Allergies:   Review of patient's allergies indicates:   Allergen Reactions    Niacin Other (See Comments)     I get flushed and burn up       Substance Abuse History:   Tobacco: Denies  Alcohol: "A lot"  Illicit Substances: history of cannabis, tried cocaine x 1  Treatment: Yes        Scheduled Meds:    nicotine  1 patch Transdermal Daily      PRN Meds:   Current Facility-Administered Medications:     acetaminophen, 650 mg, Oral, Q6H PRN    aluminum-magnesium hydroxide-simethicone, 30 mL, Oral, Q6H PRN    haloperidoL, 10 mg, Oral, Q4H PRN **AND** diphenhydrAMINE, 50 mg, Oral, Q4H PRN **AND** LORazepam, 2 mg, Oral, Q4H PRN **AND** haloperidol lactate, 10 mg, Intramuscular, Q4H PRN **AND** diphenhydrAMINE, 50 mg, Intramuscular, Q4H PRN **AND** lorazepam, 2 mg, Intramuscular, Q4H PRN    hydrOXYzine HCL, 50 mg, Oral, Q4H PRN    magnesium hydroxide 400 mg/5 ml, 30 mL, Oral, Daily PRN    ondansetron, 4 mg, Oral, Q6H PRN    traZODone, 100 mg, Oral, Nightly PRN   Psychotherapeutics (From admission, onward)      Start     Stop Route Frequency Ordered    12/10/24 2019  traZODone tablet 100 mg         -- Oral Nightly PRN 12/10/24 2019    12/10/24 2019  haloperidoL tablet 10 mg  (Med - Acute  Behavioral Management)        Placed in "And" Linked Group    -- Oral Every 4 hours PRN 12/10/24 2019    12/10/24 2019  LORazepam tablet 2 mg  (Med - Acute  Behavioral Management)        Placed in "And" Linked Group    -- Oral Every 4 hours PRN 12/10/24 2019    12/10/24 2019  haloperidol lactate injection 10 mg  (Med - Acute  Behavioral Management)        Placed in "And" Linked Group    -- IM Every 4 hours PRN 12/10/24 2019    12/10/24 2019  LORazepam injection 2 mg  (Med - Acute  Behavioral Management)        Placed in "And" Linked Group    -- IM Every 4 hours PRN 12/10/24 2019              Social " History:  Housing Status: Homeless  Relationship Status/Sexual Orientation:    Children: 2 (poor relationship with them)  Education: High school graduate   Employment Status/Info: Recently unemployed (was a )    history: Denies  History of physical/sexual abuse: Denies   Access to gun: Denies       Legal History:   Past Charges/Incarcerations: Yes   Pending charges: Denies      OBJECTIVE:   Medical Review Of Systems:  Constitutional: negative  Respiratory: negative  Cardiovascular: negative  Gastrointestinal: negative  Genitourinary:negative  Musculoskeletal:negative  Neurological: negative       Vitals   Vitals:    12/11/24 0701   BP: 111/81   Pulse: 95   Resp: 18   Temp: 97.8 °F (36.6 °C)        Labs/Imaging/Studies:   Recent Results (from the past 48 hours)   Basic Metabolic Panel    Collection Time: 12/10/24  3:40 AM   Result Value Ref Range    Sodium 136 136 - 145 mmol/L    Potassium 4.9 3.5 - 5.1 mmol/L    Chloride 108 (H) 98 - 107 mmol/L    CO2 20 (L) 23 - 31 mmol/L    Glucose 81 (L) 82 - 115 mg/dL    Blood Urea Nitrogen 20.7 8.4 - 25.7 mg/dL    Creatinine 1.16 0.72 - 1.25 mg/dL    BUN/Creatinine Ratio 18     Calcium 8.7 (L) 8.8 - 10.0 mg/dL    Anion Gap 8.0 mEq/L    eGFR >60 mL/min/1.73/m2   Hemoglobin A1C    Collection Time: 12/11/24  6:35 AM   Result Value Ref Range    Hemoglobin A1c 5.6 <=7.0 %    Estimated Average Glucose 114.0 mg/dL   TSH    Collection Time: 12/11/24  6:35 AM   Result Value Ref Range    TSH 2.265 0.350 - 4.940 uIU/mL   Lipid Panel    Collection Time: 12/11/24  6:35 AM   Result Value Ref Range    Cholesterol Total 230 (H) <=200 mg/dL    HDL Cholesterol 32 (L) 35 - 60 mg/dL    Triglyceride 225 (H) 34 - 140 mg/dL    Cholesterol/HDL Ratio 7 (H) 0 - 5    Very Low Density Lipoprotein 45     LDL Cholesterol 153.00 (H) 50.00 - 140.00 mg/dL   Comprehensive Metabolic Panel    Collection Time: 12/11/24  6:35 AM   Result Value Ref Range    Sodium 141 136 - 145 mmol/L     "Potassium 5.4 (H) 3.5 - 5.1 mmol/L    Chloride 110 (H) 98 - 107 mmol/L    CO2 23 23 - 31 mmol/L    Glucose 83 82 - 115 mg/dL    Blood Urea Nitrogen 19.8 8.4 - 25.7 mg/dL    Creatinine 1.15 0.72 - 1.25 mg/dL    Calcium 9.5 8.8 - 10.0 mg/dL    Protein Total 6.9 5.8 - 7.6 gm/dL    Albumin 3.7 3.4 - 4.8 g/dL    Globulin 3.2 2.4 - 3.5 gm/dL    Albumin/Globulin Ratio 1.2 1.1 - 2.0 ratio    Bilirubin Total 0.2 <=1.5 mg/dL     40 - 150 unit/L    ALT 56 (H) 0 - 55 unit/L    AST 28 5 - 34 unit/L    eGFR >60 mL/min/1.73/m2    Anion Gap 8.0 mEq/L    BUN/Creatinine Ratio 17    CBC with Differential    Collection Time: 12/11/24  6:35 AM   Result Value Ref Range    WBC 5.55 4.50 - 11.50 x10(3)/mcL    RBC 3.62 (L) 4.70 - 6.10 x10(6)/mcL    Hgb 10.7 (L) 14.0 - 18.0 g/dL    Hct 33.6 (L) 42.0 - 52.0 %    MCV 92.8 80.0 - 94.0 fL    MCH 29.6 27.0 - 31.0 pg    MCHC 31.8 (L) 33.0 - 36.0 g/dL    RDW 14.8 11.5 - 17.0 %    Platelet 332 130 - 400 x10(3)/mcL    MPV 9.6 7.4 - 10.4 fL    Neut % 50.7 %    Lymph % 37.5 %    Mono % 5.6 %    Eos % 4.7 %    Basophil % 1.1 %    Lymph # 2.08 0.6 - 4.6 x10(3)/mcL    Neut # 2.82 2.1 - 9.2 x10(3)/mcL    Mono # 0.31 0.1 - 1.3 x10(3)/mcL    Eos # 0.26 0 - 0.9 x10(3)/mcL    Baso # 0.06 <=0.2 x10(3)/mcL    IG# 0.02 0 - 0.04 x10(3)/mcL    IG% 0.4 %    NRBC% 0.0 %      No results found for: "PHENYTOIN", "PHENOBARB", "VALPROATE", "CBMZ"        Psychiatric Mental Status Exam:  General Appearance: appears stated age, well-developed, well-nourished  Arousal: alert  Behavior: cooperative  Movements and Motor Activity: no abnormal involuntary movements noted  Orientation: oriented to person, place, time, and situation  Speech: normal rate, normal rhythm, normal volume, normal tone  Mood: Depressed  Affect: constricted  Thought Process: linear  Associations: intact  Thought Content and Perceptions: recent suicidal ideation, no homicidal ideation, no auditory hallucinations, no visual hallucinations, no paranoid " ideation, no ideas of reference  Recent and Remote Memory: recent memory intact, remote memory intact; per interview/observation with patient  Attention and Concentration: intact, attentive to conversation; per interview/observation with patient  Fund of Knowledge: intact, aware of current events, vocabulary appropriate; based on history, vocabulary, fund of knowledge, syntax, grammar, and content  Insight: questionable; based on understanding of severity of illness and HPI  Judgment: questionable; based on patient's behavior and HPI       Patient Strengths:  Access to care and Able to verbalize needs      Patient Liabilities:  Substance use, Depression, and Housing stressors      Discharge Criteria:  Improved mood, Medication compliance, Overall functional improvement, and Improved coping skills      Reason for Admission:  The patient poses a significant and immediate danger to self., The psychiatric disorder requires intensive treatment that necessitates 24 hour observation and care., and The patient can demonstrate a reasonable expectation of improvement in his/her disorder or condition as a result of active treatment being provided.    ASSESSMENT/PLAN:   Diagnoses:  Major Depressive Disorder, recurrent, severe (F33.2)   Alcohol Use Disorder, severe (F10.20)     No past medical history on file.       Problem lists and Management Plans:  -Admit to [facility]  -Will attempt to obtain outside psychiatric records if available  - to assist with aftercare planning and collateral  -Continue inpatient treatment as evidenced by danger to self    Depression, acute  -Continue Zoloft  -Continue Seroquel    Alcohol, chronic with acute exacerbation  -Group/Individual psychotherapy  -CIWA  -Monitor vitals  -Patient completed Librium taper in the hospital    Estimated length of stay: 5-7 days    Estimated Disposition: Shelter    Estimated Follow-up: Outpatient medication management      On this date, I have reviewed the  medical history and Nursing Assessment, as well as records from referral source.  I have evaluated the mental status of the above named person and concur with the findings of all assessments.  I have provided medical direction for the development of the Treatment Plan.    I conclude that this patient meets admission criteria for inpatient treatment.  I certify that this patient poses a danger to self or others, or would otherwise be considered gravely disabled based on this assessment and/or provided collateral information.     I have provided medical direction for the development of the Treatment plan.  These services will be provided while this patient is under my care and will be based on an individualized plan of care.  The patient can demonstrate a reasonable expectation of improvement in his/her disorder as a result of the active treatment being provided.      Allan Rodriguez M.D.

## 2024-12-11 NOTE — H&P
[facility]  History & Physical    Subjective:      No chief complaint on file.       HPI:  Gabe Barnett is a 59 y.o. male.    I am suicidal. I have been badly depressed lately. I have been crying and feeling hopeless, useless and worthless. I have nothing and nobody and homeless.  I used a lot of hard liquor. I drink a 1.75 liter of bourbon in less than two days.  I drink until I pass out. I get withdrawal and shakes when I stop drinking.  I think I may had a seizure before when I stopped drinking. I though about suicide by going in the bathtub with an empty bottle and cut myself.          Past Medical History:   Diagnosis Date    Hypertension      History reviewed. No pertinent surgical history.  Family History   Problem Relation Name Age of Onset    Heart failure Mother      Scleroderma Father      Hypertension Father       Social History     Tobacco Use    Smoking status: Never    Smokeless tobacco: Former     Types: Chew   Substance Use Topics    Alcohol use: Yes     Alcohol/week: 40.0 standard drinks of alcohol     Types: 40 Shots of liquor per week     Comment: drink 1.75 liter bourbon in less than 2 days    Drug use: Never       PTA Medications   Medication Sig    aspirin (ECOTRIN) 81 MG EC tablet Take 1 tablet (81 mg total) by mouth once daily.    atorvastatin (LIPITOR) 40 MG tablet Take 40 mg by mouth once daily.    folic acid (FOLVITE) 1 MG tablet Take 1 tablet (1 mg total) by mouth once daily.    multivitamin Tab Take 1 tablet by mouth once daily.    omeprazole (PRILOSEC) 20 MG capsule Take 20 mg by mouth once daily.    QUEtiapine (SEROQUEL) 50 MG tablet Take 1 tablet (50 mg total) by mouth every evening.    sertraline (ZOLOFT) 100 MG tablet Take 1 tablet (100 mg total) by mouth once daily.    tamsulosin (FLOMAX) 0.4 mg Cap Take 1 capsule (0.4 mg total) by mouth once daily.    thiamine 100 MG tablet Take 1 tablet (100 mg total) by mouth 2 (two) times a day.      Review of patient's allergies indicates:   Allergen Reactions    Niacin Other (See Comments)     I get flushed and burn up        Review of Systems   Constitutional: Negative.    HENT: Negative.     Respiratory: Negative.  Negative for shortness of breath.    Cardiovascular: Negative.  Negative for chest pain and palpitations.   Gastrointestinal: Negative.    Genitourinary: Negative.    Musculoskeletal: Negative.    Skin: Negative.    Neurological: Negative.    Endo/Heme/Allergies: Negative.    Psychiatric/Behavioral:  Positive for depression, substance abuse and suicidal ideas. The patient is nervous/anxious.        Objective:      Vital Signs (Most Recent)  Temp: 98.1 °F (36.7 °C) (12/11/24 1100)  Pulse: 108 (12/11/24 1100)  Resp: 16 (12/11/24 1100)  BP: 106/77 (12/11/24 1100)  SpO2: 99 % (12/11/24 1100)    Vital Signs Range (Last 24H):  Temp:  [97.6 °F (36.4 °C)-98.2 °F (36.8 °C)]   Pulse:  []   Resp:  [16-18]   BP: (106-143)/(74-94)   SpO2:  [96 %-99 %]     Physical Exam  Vitals reviewed.   HENT:      Head: Normocephalic.      Nose: Nose normal.      Mouth/Throat:      Mouth: Mucous membranes are moist.      Pharynx: Oropharynx is clear.   Eyes:      Extraocular Movements: Extraocular movements intact.      Pupils: Pupils are equal, round, and reactive to light.   Cardiovascular:      Rate and Rhythm: Regular rhythm. Tachycardia present.      Comments: Apical HR: RRR at 112 bpm  Pulmonary:      Effort: Pulmonary effort is normal.      Breath sounds: Normal breath sounds.   Abdominal:      General: Abdomen is flat. Bowel sounds are normal.      Palpations: Abdomen is soft.   Musculoskeletal:         General: Normal range of motion.      Cervical back: Normal range of motion and neck supple.   Skin:     General: Skin is warm and dry.   Neurological:      General: No focal deficit present.      Mental Status: He is alert.         Data Review:    Recent Results (from the past 48 hours)   Basic Metabolic  Panel    Collection Time: 12/10/24  3:40 AM   Result Value Ref Range    Sodium 136 136 - 145 mmol/L    Potassium 4.9 3.5 - 5.1 mmol/L    Chloride 108 (H) 98 - 107 mmol/L    CO2 20 (L) 23 - 31 mmol/L    Glucose 81 (L) 82 - 115 mg/dL    Blood Urea Nitrogen 20.7 8.4 - 25.7 mg/dL    Creatinine 1.16 0.72 - 1.25 mg/dL    BUN/Creatinine Ratio 18     Calcium 8.7 (L) 8.8 - 10.0 mg/dL    Anion Gap 8.0 mEq/L    eGFR >60 mL/min/1.73/m2   Hemoglobin A1C    Collection Time: 12/11/24  6:35 AM   Result Value Ref Range    Hemoglobin A1c 5.6 <=7.0 %    Estimated Average Glucose 114.0 mg/dL   SYPHILIS ANTIBODY (WITH REFLEX RPR)    Collection Time: 12/11/24  6:35 AM   Result Value Ref Range    Syphilis Antibody Nonreactive Nonreactive, Equivocal   TSH    Collection Time: 12/11/24  6:35 AM   Result Value Ref Range    TSH 2.265 0.350 - 4.940 uIU/mL   Lipid Panel    Collection Time: 12/11/24  6:35 AM   Result Value Ref Range    Cholesterol Total 230 (H) <=200 mg/dL    HDL Cholesterol 32 (L) 35 - 60 mg/dL    Triglyceride 225 (H) 34 - 140 mg/dL    Cholesterol/HDL Ratio 7 (H) 0 - 5    Very Low Density Lipoprotein 45     LDL Cholesterol 153.00 (H) 50.00 - 140.00 mg/dL   Comprehensive Metabolic Panel    Collection Time: 12/11/24  6:35 AM   Result Value Ref Range    Sodium 141 136 - 145 mmol/L    Potassium 5.4 (H) 3.5 - 5.1 mmol/L    Chloride 110 (H) 98 - 107 mmol/L    CO2 23 23 - 31 mmol/L    Glucose 83 82 - 115 mg/dL    Blood Urea Nitrogen 19.8 8.4 - 25.7 mg/dL    Creatinine 1.15 0.72 - 1.25 mg/dL    Calcium 9.5 8.8 - 10.0 mg/dL    Protein Total 6.9 5.8 - 7.6 gm/dL    Albumin 3.7 3.4 - 4.8 g/dL    Globulin 3.2 2.4 - 3.5 gm/dL    Albumin/Globulin Ratio 1.2 1.1 - 2.0 ratio    Bilirubin Total 0.2 <=1.5 mg/dL     40 - 150 unit/L    ALT 56 (H) 0 - 55 unit/L    AST 28 5 - 34 unit/L    eGFR >60 mL/min/1.73/m2    Anion Gap 8.0 mEq/L    BUN/Creatinine Ratio 17    CBC with Differential    Collection Time: 12/11/24  6:35 AM   Result Value Ref  Range    WBC 5.55 4.50 - 11.50 x10(3)/mcL    RBC 3.62 (L) 4.70 - 6.10 x10(6)/mcL    Hgb 10.7 (L) 14.0 - 18.0 g/dL    Hct 33.6 (L) 42.0 - 52.0 %    MCV 92.8 80.0 - 94.0 fL    MCH 29.6 27.0 - 31.0 pg    MCHC 31.8 (L) 33.0 - 36.0 g/dL    RDW 14.8 11.5 - 17.0 %    Platelet 332 130 - 400 x10(3)/mcL    MPV 9.6 7.4 - 10.4 fL    Neut % 50.7 %    Lymph % 37.5 %    Mono % 5.6 %    Eos % 4.7 %    Basophil % 1.1 %    Lymph # 2.08 0.6 - 4.6 x10(3)/mcL    Neut # 2.82 2.1 - 9.2 x10(3)/mcL    Mono # 0.31 0.1 - 1.3 x10(3)/mcL    Eos # 0.26 0 - 0.9 x10(3)/mcL    Baso # 0.06 <=0.2 x10(3)/mcL    IG# 0.02 0 - 0.04 x10(3)/mcL    IG% 0.4 %    NRBC% 0.0 %     ECG:   Results for orders placed or performed during the hospital encounter of 11/30/24   EKG 12-lead    Collection Time: 11/30/24  6:54 PM   Result Value Ref Range    QRS Duration 92 ms    [facility] QTC Calculation 488 ms    Narrative    Test Reason : I48.91,    Vent. Rate :  82 BPM     Atrial Rate :  82 BPM     P-R Int : 148 ms          QRS Dur :  92 ms      QT Int : 418 ms       P-R-T Axes :  59  71  67 degrees    QTcB Int : 488 ms    Normal sinus rhythm  Prolonged QT  Abnormal ECG  When compared with ECG of 26-Sep-2024 12:05,  Sinus rhythm has replaced Atrial fibrillation  Confirmed by Ata Patel ([id number]) on 12/1/2024 12:42:12 PM    Referred By: AAAREFERRAL SELF           Confirmed By: Ata Patel      IMAGING: No results found.     Assessment:      Active Hospital Problems    Diagnosis  POA    Mixed hyperlipidemia [E78.2]  Yes    Atrial fibrillation [I48.91]  No    Tachycardia [R00.0]  Yes    Hyperkalemia [E87.5]  Yes      Resolved Hospital Problems   No resolved problems to display.       Plan:    Last EKG show Sinus rthythm. Previously atrial fibrillation.  Will resume Atorvastatin, ASA 81mg, Toprol XL low dose  Recheck BMP for K  Hold Lisinopril due do Hyperkalemia  Recheck Friday after BMP tomorrow for BP and if heart sounds regular or evidence of a Fib  Plan per  psychiatrist

## 2024-12-11 NOTE — CARE UPDATE
Treatment Team  Pt seen for treatment team today with interdisciplinary team. Pt was calm and cooperative with appropriate thought process with Tx team. Pt stated he is feeling depressed and is ashamed to contact his family. Pt reported recent stressors of losing job and he was found drinking when he had a layover in [place] so his job let him go. Pt verbalized understanding of care plan. Sw educated pt on shelter placement on offered family session. Pt dc location is unknown at this time but staff will work with pt to figure out next steps.

## 2024-12-11 NOTE — PROGRESS NOTES
12/11/24 1400   [facility] Group Therapy   Group Name Therapeutic Recreation   Specific Interventions Coping Skills Training   Participation Level Active;Supportive;Appropriate;Attentive   Participation Quality Cooperative   Insight/Motivation Limited   Affect/Mood Display Blunted;Flat;Depressed   Cognition Oriented;Alert   Psychomotor WNL

## 2024-12-11 NOTE — PLAN OF CARE
Problem: Adult Behavioral Health Plan of Care  Goal: Plan of Care Review  Outcome: Progressing  Flowsheets (Taken 12/11/2024 0834)  Patient Agreement with Plan of Care: agrees  Plan of Care Reviewed With: patient  Goal: Patient-Specific Goal (Individualization)  Outcome: Progressing  Flowsheets (Taken 12/11/2024 0834)  Patient Personal Strengths: independent living skills  Patient Vulnerabilities: substance abuse/addiction  Goal: Adheres to Safety Considerations for Self and Others  Outcome: Progressing  Flowsheets (Taken 12/11/2024 0834)  Adheres to Safety Considerations for Self and Others: making progress toward outcome  Intervention: Develop and Maintain Individualized Safety Plan  Flowsheets (Taken 12/11/2024 0834)  Safety Measures: safety rounds completed  Goal: Absence of New-Onset Illness or Injury  Outcome: Progressing  Intervention: Identify and Manage Fall Risk  Flowsheets (Taken 12/11/2024 0834)  Safety Measures: safety rounds completed  Intervention: Prevent VTE (Venous Thromboembolism)  Flowsheets (Taken 12/11/2024 0834)  VTE Prevention/Management: fluids promoted  Intervention: Prevent Infection  Flowsheets (Taken 12/11/2024 0834)  Infection Prevention: hand hygiene promoted  Goal: Optimized Coping Skills in Response to Life Stressors  Outcome: Progressing  Flowsheets (Taken 12/11/2024 0834)  Optimized Coping Skills in Response to Life Stressors: making progress toward outcome  Intervention: Promote Effective Coping Strategies  Flowsheets (Taken 12/11/2024 0834)  Supportive Measures: self-care encouraged  Goal: Develops/Participates in Therapeutic [place] to Support Successful Transition  Outcome: Progressing  Flowsheets (Taken 12/11/2024 0834)  Develops/Participates in Therapeutic [place] to Support Successful Transition: making progress toward outcome  Intervention: Foster Therapeutic [place]  Flowsheets (Taken 12/11/2024 0834)  Trust Relationship/Rapport: care explained  Goal: Rounds/Family  Conference  Outcome: Progressing  Flowsheets (Taken 12/11/2024 0834)  Participants:   milieu/psych techs   nursing     Problem: Depressive Signs/Symptoms  Goal: Optimized Energy Level (Depressive Signs/Symptoms)  Outcome: Progressing  Flowsheets (Taken 12/11/2024 0834)  Mutually Determined Action Steps (Optimized Energy Level): grooms self without prompting  Intervention: Optimize Energy Level  Flowsheets (Taken 12/11/2024 0834)  Activity (Behavioral Health): up ad mattie  Patient Performed Hygiene: teeth brushed  Diversional Activity: television  Goal: Optimized Cognitive Function (Depressive Signs/Symptoms)  Outcome: Progressing  Flowsheets (Taken 12/11/2024 0834)  Mutually Determined Action Steps (Optimized Cognitive Function): participates in attention training  Goal: Increased Participation and Engagement (Depressive Signs/Symptoms)  Outcome: Progressing  Flowsheets (Taken 12/11/2024 0834)  Mutually Determined Action Steps (Increased Participation and Engagement): participates in one or more activity  Intervention: Facilitate Participation and Engagement  Flowsheets (Taken 12/11/2024 0834)  Supportive Measures: self-care encouraged  Diversional Activity: television  Goal: Enhanced Self-Esteem and Confidence (Depressive Signs/Symptoms)  Outcome: Progressing  Flowsheets (Taken 12/11/2024 0834)  Mutually Determined Action Steps (Enhanced Self-Esteem and Confidence): identifies judgmental thoughts  Intervention: Promote Confidence and Self-Esteem  Flowsheets (Taken 12/11/2024 0834)  Supportive Measures: self-care encouraged  Goal: Improved Mood Symptoms (Depressive Signs/Symptoms)  Outcome: Progressing  Flowsheets (Taken 12/11/2024 0834)  Mutually Determined Action Steps (Improved Mood Symptoms): acknowledges progress  Intervention: Promote Mood Improvement  Flowsheets (Taken 12/11/2024 0834)  Supportive Measures: self-care encouraged  Diversional Activity: television  Goal: Optimized Nutrition Intake (Depressive  Signs/Symptoms)  Outcome: Progressing  Flowsheets (Taken 12/11/2024 0834)  Mutually Determined Action Steps (Optimized Nutrition Intake): eats at meal time  Intervention: Promote Optimal Nutrition Intake  Flowsheets (Taken 12/11/2024 0834)  Nutrition Interventions: food preferences provided  Bowel Elimination Promotion: adequate fluid intake promoted  Goal: Improved Psychomotor Symptoms (Depressive Signs/Symptoms)  Outcome: Progressing  Flowsheets (Taken 12/11/2024 0834)  Mutually Determined Action Steps (Improved Psychomotor Symptoms): exhibits decrease in agitation  Intervention: Manage Psychomotor Movement  Flowsheets (Taken 12/11/2024 0834)  Activity (Behavioral Health): up ad mattie  Patient Performed Hygiene: teeth brushed  Diversional Activity: television  Goal: Improved Sleep (Depressive Signs/Symptoms)  Outcome: Progressing  Flowsheets (Taken 12/11/2024 0834)  Mutually Determined Action Steps (Improved Sleep): sleeps 4-6 hours at night  Intervention: Promote Healthy Sleep Hygiene  Flowsheets (Taken 12/11/2024 0834)  Sleep Hygiene Promotion: regular sleep pattern promoted  Goal: Enhanced Social, Occupational or Functional Skills (Depressive Signs/Symptoms)  Outcome: Progressing  Flowsheets (Taken 12/11/2024 0834)  Mutually Determined Action Steps (Enhanced Social, Occupational or Functional Skills): participates in social skills training  Intervention: Promote Social, Occupational and Functional Ability  Flowsheets (Taken 12/11/2024 0834)  Social Functional Ability Promotion: autonomy promoted     Problem: Excessive Substance Use  Goal: Optimized Energy Level (Excessive Substance Use)  Outcome: Progressing  Flowsheets (Taken 12/11/2024 0834)  Mutually Determined Action Steps (Optimized Energy Level): grooms self without prompting  Intervention: Optimize Energy Level  Flowsheets (Taken 12/11/2024 0834)  Activity (Behavioral Health): up ad mattie  Patient Performed Hygiene: teeth brushed  Diversional Activity:  television  Goal: Improved Behavioral Control (Excessive Substance Use)  Outcome: Progressing  Flowsheets (Taken 12/11/2024 0834)  Mutually Determined Action Steps (Improved Behavioral Control): identifies major stressors  Intervention: Promote Behavior and Impulse Control  Flowsheets (Taken 12/11/2024 0834)  Behavior Management: behavioral plan reviewed  Goal: Increased Participation and Engagement (Excessive Substance Use)  Outcome: Progressing  Flowsheets (Taken 12/11/2024 0834)  Mutually Determined Action Steps (Increased Participation and Engagement): discusses ongoing recovery plan  Intervention: Facilitate Participation and Engagement  Flowsheets (Taken 12/11/2024 0834)  Supportive Measures: self-care encouraged  Diversional Activity: television  Goal: Improved Physiologic Symptoms (Excessive Substance Use)  Outcome: Progressing  Flowsheets (Taken 12/11/2024 0834)  Mutually Determined Action Steps (Improved Physiologic Symptoms): discusses use pattern  Intervention: Optimize Physiologic Function  Flowsheets (Taken 12/11/2024 0834)  Oral Nutrition Promotion: rest periods promoted  Nutrition Interventions: food preferences provided  Goal: Enhanced Social, Occupational or Functional Skills (Excessive Substance Use)  Outcome: Progressing  Flowsheets (Taken 12/11/2024 0834)  Mutually Determined Action Steps (Enhanced Social, Occupational or Functional Skills): participates in social skills training  Intervention: Promote Social, Occupational and Functional Ability  Flowsheets (Taken 12/11/2024 0834)  Trust Relationship/Rapport: care explained  Social Functional Ability Promotion: autonomy promoted     Problem: Suicide Risk  Goal: Absence of Self-Harm  Outcome: Progressing  Intervention: Assess Risk to Self and Maintain Safety  Flowsheets (Taken 12/11/2024 0834)  Behavior Management: behavioral plan reviewed  Self-Harm Prevention: environmental self-harm risks assessed  Intervention: Promote Psychosocial  "Wellbeing  Flowsheets (Taken 12/11/2024 0834)  Sleep/Rest Enhancement: regular sleep/rest pattern promoted  Supportive Measures: self-care encouraged  Family/Support System Care: self-care encouraged  Intervention: Establish Safety Plan and Continuity of Care  Flowsheets (Taken 12/11/2024 0834)  Safe Transition Promotion: access to lethal means addressed    He is AAO X 4. Flat affect and depressed mood. Stated he is "Blah" today. Informed staff that he is homeless and he has been living at [facility] [address] in [place], [place]. Presently, he denies SI, HI, hallucinations, and delusions. States he has a sister who lives in [place]. He is unsure if he can live with her. He stated that he doesn't want to be a burden on anyone. Good eye contact noted. Speech normal tone and speed. He interacts with selected patients and staff. Continue plan of care and provide a safe and therapeutic environment. Continue to monitor every fifteen minutes for safety.     "

## 2024-12-11 NOTE — PLAN OF CARE
Problem: Adult Behavioral Health Plan of Care  Goal: Plan of Care Review  Outcome: Unable to Meet  Goal: Patient-Specific Goal (Individualization)  Outcome: Unable to Meet  Goal: Adheres to Safety Considerations for Self and Others  Outcome: Unable to Meet  Goal: Absence of New-Onset Illness or Injury  Outcome: Unable to Meet  Goal: Optimized Coping Skills in Response to Life Stressors  Outcome: Unable to Meet  Goal: Develops/Participates in Therapeutic [place] to Support Successful Transition  Outcome: Unable to Meet  Goal: Rounds/Family Conference  Outcome: Unable to Meet     Problem: Depressive Signs/Symptoms  Goal: Optimized Energy Level (Depressive Signs/Symptoms)  Outcome: Unable to Meet  Goal: Optimized Cognitive Function (Depressive Signs/Symptoms)  Outcome: Unable to Meet  Goal: Increased Participation and Engagement (Depressive Signs/Symptoms)  Outcome: Unable to Meet  Goal: Enhanced Self-Esteem and Confidence (Depressive Signs/Symptoms)  Outcome: Unable to Meet  Goal: Improved Mood Symptoms (Depressive Signs/Symptoms)  Outcome: Unable to Meet  Goal: Optimized Nutrition Intake (Depressive Signs/Symptoms)  Outcome: Unable to Meet  Goal: Improved Psychomotor Symptoms (Depressive Signs/Symptoms)  Outcome: Unable to Meet  Goal: Improved Sleep (Depressive Signs/Symptoms)  Outcome: Unable to Meet  Goal: Enhanced Social, Occupational or Functional Skills (Depressive Signs/Symptoms)  Outcome: Unable to Meet     Problem: Excessive Substance Use  Goal: Optimized Energy Level (Excessive Substance Use)  Outcome: Unable to Meet  Goal: Improved Behavioral Control (Excessive Substance Use)  Outcome: Unable to Meet  Goal: Increased Participation and Engagement (Excessive Substance Use)  Outcome: Unable to Meet  Goal: Improved Physiologic Symptoms (Excessive Substance Use)  Outcome: Unable to Meet  Goal: Enhanced Social, Occupational or Functional Skills (Excessive Substance Use)  Outcome: Unable to Meet     Problem:  Suicide Risk  Goal: Absence of Self-Harm  Outcome: Unable to Meet     60 y/o male admitted on PEC/CEC for depression and SI. Pt has been at [facility] for 10 days for complicated medical issues related to ETOH abuse. (See prior admission notes) Pt presents with a flat affect, depressed but denies active SI and contracts for safety. Dr. Rodriguez made aware of Pts Hgb of 9.5 and potassium of 4.9, he wants AM labs to re-check. Pt says he was last impatient in 2013 at another facility for depression. Pt is homeless currently. Q15 min safety checks initiated.

## 2024-12-11 NOTE — NURSING
Patient left with [facility] for transfer/discharge to [facility]. Patient was calm, cooperative, and thankful for everyone's help.

## 2024-12-11 NOTE — PROGRESS NOTES
Gabe is a 58y/o male admitted for Major Depressive Disorder, recurrent, severe (F33.2) and Alcohol Use Disorder, severe (F10.20) with a uds + benzos. CTRS met with Pt 1:1, Gabe was cooperative, reporting admission as I was released from my job and not in a good state of mind; drinking, B [place], and ability to perform his ADL's. CTRS educated Pt to TR group times and dates, with Gabe agreeing to attend TR groups. Gabe reported his treatment goal as Get my head right with CTRS utilizing interdisciplinary treatment goal of Improved Mood Symptoms.     12/11/24 0857   General   Admit Date 12/10/24   Primary Diagnosis Major Depressive Disorder, recurrent, severe (F33.2)   Secondary Diagnosis Alcohol Use Disorder, severe (F10.20)   Muslim None reported   Number of Children 2   Children Living? 2   Occupation unemployed    Does the patient have dentures? No   If you were to take part in activities, which of the following would you prefer? Both   Do you feel like you have enough to keep you busy now? Yes   Do you believe that you have the opportunity for physical activity? Yes   Activity Capabilities Maximum   Subjective   Patient states I was released from my job and not in a good state of mind; drinking   Precautions   General Precautions seizure   Cardiovascular/Pulmonary Other (see comments)  (A fib and NICMO)   Assessment   Mobility ambulates independently   Transfers independently   Musculoskeletal   (none reported)   Visual Acuity normal vision   Visual Perception depth perception;color perception;recognizes letters;recognizes numbers   Hearing mild hearing loss  (reports B [place])   Speech/Communication normal   Cognitive Concerns oriented x4   Emotional Concerns poor self image;critical of self or others  (self)   Leisure Interest Survey   Leisure Interest Survey No  (Nothing really now)   Goals   Additional Documentation yes   Goal Formulation With patient   Time For Goal Achievement 7 days   Goal  1 Get my head right/Improved Mood Symptoms   Goal 1-Progress Ongoing-   Plan   Planned Therapy Intervention Group Recreational Therapy   Expected Length of Stay 5-7days   PT Frequency Minimum of 3 visits per week

## 2024-12-12 PROBLEM — F32.A DEPRESSION: Status: ACTIVE | Noted: 2024-12-12

## 2024-12-12 PROBLEM — F10.939 ALCOHOL WITHDRAWAL: Status: RESOLVED | Noted: 2024-09-27 | Resolved: 2024-12-12

## 2024-12-12 LAB
ANION GAP SERPL CALC-SCNC: 8 MEQ/L
BUN SERPL-MCNC: 25.9 MG/DL (ref 8.4–25.7)
CALCIUM SERPL-MCNC: 9.3 MG/DL (ref 8.8–10)
CHLORIDE SERPL-SCNC: 111 MMOL/L (ref 98–107)
CO2 SERPL-SCNC: 22 MMOL/L (ref 23–31)
CREAT SERPL-MCNC: 1.2 MG/DL (ref 0.72–1.25)
CREAT/UREA NIT SERPL: 22
GFR SERPLBLD CREATININE-BSD FMLA CKD-EPI: >60 ML/MIN/1.73/M2
GLUCOSE SERPL-MCNC: 81 MG/DL (ref 82–115)
POTASSIUM SERPL-SCNC: 5.2 MMOL/L (ref 3.5–5.1)
SODIUM SERPL-SCNC: 141 MMOL/L (ref 136–145)

## 2024-12-12 PROCEDURE — 25000242 PHARM REV CODE 250 ALT 637 W/ HCPCS: Performed by: PSYCHIATRY & NEUROLOGY

## 2024-12-12 PROCEDURE — 12400001 HC PSYCH SEMI-PRIVATE ROOM

## 2024-12-12 PROCEDURE — 25000003 PHARM REV CODE 250: Performed by: FAMILY MEDICINE

## 2024-12-12 PROCEDURE — 25000003 PHARM REV CODE 250: Performed by: PSYCHIATRY & NEUROLOGY

## 2024-12-12 PROCEDURE — 80048 BASIC METABOLIC PNL TOTAL CA: CPT | Performed by: FAMILY MEDICINE

## 2024-12-12 RX ORDER — SERTRALINE HYDROCHLORIDE 100 MG/1
100 TABLET, FILM COATED ORAL DAILY
Qty: 30 TABLET | Refills: 0 | Status: SHIPPED | OUTPATIENT
Start: 2024-12-12 | End: 2025-01-11

## 2024-12-12 RX ORDER — METOPROLOL SUCCINATE 25 MG/1
25 TABLET, EXTENDED RELEASE ORAL 2 TIMES DAILY
Qty: 60 TABLET | Refills: 0 | Status: SHIPPED | OUTPATIENT
Start: 2024-12-12 | End: 2025-01-11

## 2024-12-12 RX ORDER — ATORVASTATIN CALCIUM 40 MG/1
40 TABLET, FILM COATED ORAL DAILY
Qty: 30 TABLET | Refills: 0 | Status: SHIPPED | OUTPATIENT
Start: 2024-12-12 | End: 2025-01-11

## 2024-12-12 RX ORDER — ASPIRIN 81 MG/1
81 TABLET ORAL DAILY
Qty: 30 TABLET | Refills: 0 | Status: SHIPPED | OUTPATIENT
Start: 2024-12-12 | End: 2025-01-11

## 2024-12-12 RX ORDER — QUETIAPINE FUMARATE 50 MG/1
50 TABLET, FILM COATED ORAL NIGHTLY
Qty: 30 TABLET | Refills: 0 | Status: SHIPPED | OUTPATIENT
Start: 2024-12-12 | End: 2025-01-11

## 2024-12-12 RX ORDER — IBUPROFEN 200 MG
1 TABLET ORAL DAILY
Qty: 28 PATCH | Refills: 0 | Status: SHIPPED | OUTPATIENT
Start: 2024-12-13 | End: 2025-01-10

## 2024-12-12 RX ADMIN — THERA TABS 1 TABLET: TAB at 10:12

## 2024-12-12 RX ADMIN — THIAMINE HCL TAB 100 MG 100 MG: 100 TAB at 10:12

## 2024-12-12 RX ADMIN — FLUTICASONE PROPIONATE 100 MCG: 50 SPRAY, METERED NASAL at 10:12

## 2024-12-12 RX ADMIN — METOPROLOL SUCCINATE 25 MG: 25 TABLET, EXTENDED RELEASE ORAL at 10:12

## 2024-12-12 RX ADMIN — FOLIC ACID 1 MG: 1 TABLET ORAL at 10:12

## 2024-12-12 RX ADMIN — QUETIAPINE FUMARATE 50 MG: 25 TABLET ORAL at 08:12

## 2024-12-12 RX ADMIN — METOPROLOL SUCCINATE 25 MG: 25 TABLET, EXTENDED RELEASE ORAL at 08:12

## 2024-12-12 RX ADMIN — ATORVASTATIN CALCIUM 40 MG: 10 TABLET, FILM COATED ORAL at 10:12

## 2024-12-12 RX ADMIN — SERTRALINE HYDROCHLORIDE 100 MG: 50 TABLET ORAL at 10:12

## 2024-12-12 RX ADMIN — ASPIRIN 81 MG: 81 TABLET, COATED ORAL at 10:12

## 2024-12-12 RX ADMIN — CETIRIZINE HYDROCHLORIDE 10 MG: 10 TABLET, FILM COATED ORAL at 10:12

## 2024-12-12 NOTE — PLAN OF CARE
Problem: Adult Behavioral Health Plan of Care  Goal: Plan of Care Review  Outcome: Progressing  Flowsheets (Taken 12/12/2024 1108)  Patient Agreement with Plan of Care: agrees  Plan of Care Reviewed With: patient  Goal: Patient-Specific Goal (Individualization)  Outcome: Progressing  Flowsheets (Taken 12/12/2024 1108)  Patient Personal Strengths: independent living skills  Patient Vulnerabilities: substance abuse/addiction  Goal: Adheres to Safety Considerations for Self and Others  Outcome: Progressing  Flowsheets (Taken 12/12/2024 1108)  Adheres to Safety Considerations for Self and Others: making progress toward outcome  Intervention: Develop and Maintain Individualized Safety Plan  Flowsheets (Taken 12/12/2024 1108)  Safety Measures: safety rounds completed  Goal: Absence of New-Onset Illness or Injury  Outcome: Progressing  Intervention: Identify and Manage Fall Risk  Flowsheets (Taken 12/12/2024 1108)  Safety Measures: safety rounds completed  Intervention: Prevent VTE (Venous Thromboembolism)  Flowsheets (Taken 12/12/2024 1108)  VTE Prevention/Management: fluids promoted  Intervention: Prevent Infection  Flowsheets (Taken 12/12/2024 1108)  Infection Prevention: hand hygiene promoted  Goal: Optimized Coping Skills in Response to Life Stressors  Outcome: Progressing  Flowsheets (Taken 12/12/2024 1108)  Optimized Coping Skills in Response to Life Stressors: making progress toward outcome  Intervention: Promote Effective Coping Strategies  Flowsheets (Taken 12/12/2024 1108)  Supportive Measures: self-care encouraged  Goal: Develops/Participates in Therapeutic [place] to Support Successful Transition  Outcome: Progressing  Flowsheets (Taken 12/12/2024 1108)  Develops/Participates in Therapeutic [place] to Support Successful Transition: making progress toward outcome  Intervention: Foster Therapeutic [place]  Flowsheets (Taken 12/12/2024 1108)  Trust Relationship/Rapport: care explained  Goal: Rounds/Family  Conference  Outcome: Progressing  Flowsheets (Taken 12/12/2024 1108)  Participants:   milieu/psych techs   nursing     Problem: Depressive Signs/Symptoms  Goal: Optimized Energy Level (Depressive Signs/Symptoms)  Outcome: Progressing  Flowsheets (Taken 12/12/2024 1108)  Mutually Determined Action Steps (Optimized Energy Level): grooms self without prompting  Intervention: Optimize Energy Level  Flowsheets (Taken 12/12/2024 1108)  Activity (Behavioral Health): up ad mattie  Patient Performed Hygiene: teeth brushed  Diversional Activity: television  Goal: Optimized Cognitive Function (Depressive Signs/Symptoms)  Outcome: Progressing  Flowsheets (Taken 12/12/2024 1108)  Mutually Determined Action Steps (Optimized Cognitive Function): participates in attention training  Goal: Increased Participation and Engagement (Depressive Signs/Symptoms)  Outcome: Progressing  Flowsheets (Taken 12/12/2024 1108)  Mutually Determined Action Steps (Increased Participation and Engagement): participates in one or more activity  Intervention: Facilitate Participation and Engagement  Flowsheets (Taken 12/12/2024 1108)  Supportive Measures: self-care encouraged  Diversional Activity: television  Goal: Enhanced Self-Esteem and Confidence (Depressive Signs/Symptoms)  Outcome: Progressing  Flowsheets (Taken 12/12/2024 1108)  Mutually Determined Action Steps (Enhanced Self-Esteem and Confidence): identifies judgmental thoughts  Intervention: Promote Confidence and Self-Esteem  Flowsheets (Taken 12/12/2024 1108)  Supportive Measures: self-care encouraged  Goal: Improved Mood Symptoms (Depressive Signs/Symptoms)  Outcome: Progressing  Flowsheets (Taken 12/12/2024 1108)  Mutually Determined Action Steps (Improved Mood Symptoms): acknowledges progress  Intervention: Promote Mood Improvement  Flowsheets (Taken 12/12/2024 1108)  Supportive Measures: self-care encouraged  Diversional Activity: television  Goal: Optimized Nutrition Intake (Depressive  Signs/Symptoms)  Outcome: Progressing  Flowsheets (Taken 12/12/2024 1108)  Mutually Determined Action Steps (Optimized Nutrition Intake): eats at meal time  Intervention: Promote Optimal Nutrition Intake  Flowsheets (Taken 12/12/2024 1108)  Nutrition Interventions: food preferences provided  Bowel Elimination Promotion: adequate fluid intake promoted  Goal: Improved Psychomotor Symptoms (Depressive Signs/Symptoms)  Outcome: Progressing  Flowsheets (Taken 12/12/2024 1108)  Mutually Determined Action Steps (Improved Psychomotor Symptoms): adheres to medication regimen  Intervention: Manage Psychomotor Movement  Flowsheets (Taken 12/12/2024 1108)  Activity (Behavioral Health): up ad mattie  Patient Performed Hygiene: teeth brushed  Diversional Activity: television  Goal: Improved Sleep (Depressive Signs/Symptoms)  Outcome: Progressing  Flowsheets (Taken 12/12/2024 1108)  Mutually Determined Action Steps (Improved Sleep): sleeps 4-6 hours at night  Intervention: Promote Healthy Sleep Hygiene  Flowsheets (Taken 12/12/2024 1108)  Sleep Hygiene Promotion: regular sleep pattern promoted  Goal: Enhanced Social, Occupational or Functional Skills (Depressive Signs/Symptoms)  Outcome: Progressing  Flowsheets (Taken 12/12/2024 1108)  Mutually Determined Action Steps (Enhanced Social, Occupational or Functional Skills): participates in social skills training  Intervention: Promote Social, Occupational and Functional Ability  Flowsheets (Taken 12/12/2024 1108)  Social Functional Ability Promotion: autonomy promoted     Problem: Excessive Substance Use  Goal: Optimized Energy Level (Excessive Substance Use)  Outcome: Progressing  Flowsheets (Taken 12/12/2024 1108)  Mutually Determined Action Steps (Optimized Energy Level): grooms self without prompting  Intervention: Optimize Energy Level  Flowsheets (Taken 12/12/2024 1108)  Activity (Behavioral Health): up ad mattie  Patient Performed Hygiene: teeth brushed  Diversional Activity:  television  Goal: Improved Behavioral Control (Excessive Substance Use)  Outcome: Progressing  Flowsheets (Taken 12/12/2024 1108)  Mutually Determined Action Steps (Improved Behavioral Control): identifies major stressors  Intervention: Promote Behavior and Impulse Control  Flowsheets (Taken 12/12/2024 1108)  Behavior Management: behavioral plan reviewed  Goal: Increased Participation and Engagement (Excessive Substance Use)  Outcome: Progressing  Flowsheets (Taken 12/12/2024 1108)  Mutually Determined Action Steps (Increased Participation and Engagement): discusses ongoing recovery plan  Intervention: Facilitate Participation and Engagement  Flowsheets (Taken 12/12/2024 1108)  Supportive Measures: self-care encouraged  Diversional Activity: television  Goal: Improved Physiologic Symptoms (Excessive Substance Use)  Outcome: Progressing  Flowsheets (Taken 12/12/2024 1108)  Mutually Determined Action Steps (Improved Physiologic Symptoms): discusses use pattern  Intervention: Optimize Physiologic Function  Flowsheets (Taken 12/12/2024 1108)  Oral Nutrition Promotion: rest periods promoted  Nutrition Interventions: food preferences provided  Goal: Enhanced Social, Occupational or Functional Skills (Excessive Substance Use)  Outcome: Progressing  Flowsheets (Taken 12/12/2024 1108)  Mutually Determined Action Steps (Enhanced Social, Occupational or Functional Skills): participates in social skills training  Intervention: Promote Social, Occupational and Functional Ability  Flowsheets (Taken 12/12/2024 1108)  Trust Relationship/Rapport: care explained  Social Functional Ability Promotion: autonomy promoted     Problem: Suicide Risk  Goal: Absence of Self-Harm  Outcome: Progressing  Intervention: Assess Risk to Self and Maintain Safety  Flowsheets (Taken 12/12/2024 1108)  Behavior Management: behavioral plan reviewed  Self-Harm Prevention: environmental self-harm risks assessed  Intervention: Promote Psychosocial  Wellbeing  Flowsheets (Taken 12/12/2024 1108)  Sleep/Rest Enhancement: regular sleep/rest pattern promoted  Supportive Measures: self-care encouraged  Family/Support System Care: self-care encouraged  Intervention: Establish Safety Plan and Continuity of Care  Flowsheets (Taken 12/12/2024 1108)  Safe Transition Promotion: access to lethal means addressed    He is AAO X 4. Flat affect and depressed mood. He endorses anxiety and depression this AM. Denies SI, HI, hallucinations, and delusions. Good eye contact noted. Speech normal tone and speed. Interacts with selected patients and staff. Continue plan of care and provide a safe and therapeutic environment. Continue to monitor every fifteen minutes for safety.

## 2024-12-12 NOTE — PROGRESS NOTES
Refused despite encouragement, Alternative materials were offered.    12/12/24 2904   [facility] Group Therapy   Group Name Therapeutic Recreation   Specific Interventions Skilled Activity Leisure Education and Awareness   Participation Level None   Participation Quality Refused

## 2024-12-12 NOTE — PLAN OF CARE
Gabe met reported treatment goals of Get my head right, reporting better sleep, talking to my family, and being a more responsible drinker.    CTRS Discharge Recommendations:  Encouraged Pt. to actively utilize available community resources to increase leisure involvement to decrease signs and symptoms of illness.  Encouraged Pt. to utilize coping skills on a regular basis to reduce the risk of decomposition and re-hospitalization.

## 2024-12-12 NOTE — PLAN OF CARE
"Behavioral Health Unit  Psychosocial History and Assessment  Progress Note      Patient Name: Gabe Barnett YOB: 1965 SW: HUI Perales,[facility] Date: 12/12/2024    Chief Complaint: depression    Consent:     Did the patient consent for an interview with the ? Yes    Did the patient consent for the  to contact family/friend/caregiver?   No    Did the patient give consent for the  to inform family/friend/caregiver of his/her whereabouts or to discuss discharge planning? No    Source of Information: Face to face with patient and Chart review    Is information obtained from interviews considered reliable?   yes    Reason for Admission:     Active Hospital Problems    Diagnosis  POA    Mixed hyperlipidemia [E78.2]  Yes    Atrial fibrillation [I48.91]  No    Tachycardia [R00.0]  Yes    Hyperkalemia [E87.5]  Yes      Resolved Hospital Problems   No resolved problems to display.       History of Present Illness - (Patient Perception):   Pt stated he was recently released from his job which caused his depression. Pt stated he has a layover in [place] and was gambling and drinking. Pt stated his job found out he was drinking and let him go.       Present biopsychosocial functioning: cooperative, emotional, hopeless     Past biopsychosocial functioning: Pt has hx of higher functioning.     Family and Marital/Relationship History:     Significant Other/Partner Relationships:  : 2 children     Family Relationships: Estranged      Childhood History:     Where was patient raised? [place]     Who raised the patient? Mother and father       How does patient describe their childhood? "Eventful"       Who is patient's primary support person? Sister       Culture and Anabaptist:     Anabaptist: None noted     How strong of a role does Yarsani and spirituality play in patient's life? Minimal     Hinduism or spiritual concerns regarding treatment: observation of holy days " "    History of Abuse:   History of Abuse: Denies      Outcome: n/a    Psychiatric and Medical History:     History of psychiatric illness or treatment: prior inpatient treatment    Medical history:   Past Medical History:   Diagnosis Date    Hypertension        Substance Abuse History:     Alcohol - (Patient Perspective): Pt stated he drinks "a lot" and normally "big bottles" last less than 2 days.   Social History     Substance and Sexual Activity   Alcohol Use Yes    Alcohol/week: 40.0 standard drinks of alcohol    Types: 40 Shots of liquor per week    Comment: drink 1.75 liter bourbon in less than 2 days         Drugs - (Patient Perspective): Pt denied current use.   Social History     Substance and Sexual Activity   Drug Use Never         Education:     Currently Enrolled? No  High School (9-12) or GED    Special Education? No    Interested in Completing Education/GED: No    Employment and Financial:     Currently employed? Unemployed pt last worked as a      Source of Income:  none noted     Able to afford basic needs (food, shelter, utilities)? No    Who manages finances/personal affairs? self      Service:     [place]? no    Combat Service? No     Community Resources:     Describe present use of community resources: inpatient mh      Identify previously used community resources   (Include previous mental health treatment - outpatient and inpatient): inpatient mh     Environmental:     Current living situation:homeless, pt was previously living at [address]     Social Evaluation:     Patient Assets: General fund of knowledge, Capable of independent living, and Work skills    Patient Limitations: poor coping skills, homeless     High risk psychosocial issues that may impact discharge planning:   Homeless     Recommendations:     Anticipated discharge plan:   Shelter or sober living placement   Outpatient follow-up     High risk issues requiring early treatment planning and " immediate intervention: homeless     Community resources needed for discharge planning:  housing, living arrangements, and aftercare treatment sources    Anticipated social work role(s) in treatment and discharge planning: SW will  and offer advice along with group therapy, ind as necessary and dc planning.    12/12/24 9770   Initial Information   Source of Information patient   Reason for Admission depression   Patient Aware of Diagnosis yes   Arrived From emergency department   Current or Previous  Service none   Legal Status    Type of Admission Involuntary   Spiritual Beliefs   Spiritual, Cultural Beliefs, Anabaptism Practices, Values that Affect Care no   Substance Use/Withdrawal   Substance Use Denies use   Additional Tobacco Use   How many cigarettes do you typically have per day? 0   Abuse Screen (yes response referral indicated)   Feels Unsafe at Home or Work/School no   Feels Threatened by Someone no   Does anyone try to keep you from having contact with others or doing things outside your home? no   Physical Signs of Abuse Present no   Abuse Details   Physical Abuse No   Sexual Abuse No   Emotional Abuse No   AUDIT-C (Alcohol Use Disorders ID Test)   Alcohol Use In Past Year 4-->four or more times a week   Alcohol Amount Per Day In Past Year 4-->ten or more   More Than 6 Drinks On One Occasion In Past Year 4-->daily or almost daily   Total Audit C Score 12

## 2024-12-12 NOTE — PLAN OF CARE
Problem: Adult Behavioral Health Plan of Care  Goal: Plan of Care Review  Outcome: Progressing  Flowsheets (Taken 12/11/2024 2318)  Patient Agreement with Plan of Care: agrees  Plan of Care Reviewed With: patient  Goal: Patient-Specific Goal (Individualization)  Outcome: Progressing  Flowsheets (Taken 12/11/2024 2318)  Patient Personal Strengths: independent living skills  Patient Vulnerabilities: substance abuse/addiction  Goal: Adheres to Safety Considerations for Self and Others  Outcome: Progressing  Intervention: Develop and Maintain Individualized Safety Plan  Flowsheets (Taken 12/11/2024 2318)  Safety Measures: safety rounds completed  Goal: Absence of New-Onset Illness or Injury  Outcome: Progressing  Intervention: Identify and Manage Fall Risk  Flowsheets (Taken 12/11/2024 2318)  Safety Measures: safety rounds completed  Intervention: Prevent VTE (Venous Thromboembolism)  Flowsheets (Taken 12/11/2024 2318)  VTE Prevention/Management: fluids promoted  Intervention: Prevent Infection  Flowsheets (Taken 12/11/2024 2318)  Infection Prevention: hand hygiene promoted  Goal: Optimized Coping Skills in Response to Life Stressors  Outcome: Progressing  Flowsheets (Taken 12/11/2024 2318)  Optimized Coping Skills in Response to Life Stressors: making progress toward outcome  Intervention: Promote Effective Coping Strategies  Flowsheets (Taken 12/11/2024 2318)  Supportive Measures: self-care encouraged  Goal: Develops/Participates in Therapeutic [place] to Support Successful Transition  Outcome: Progressing  Flowsheets (Taken 12/11/2024 2318)  Develops/Participates in Therapeutic [place] to Support Successful Transition: making progress toward outcome  Intervention: Foster Therapeutic [place]  Flowsheets (Taken 12/11/2024 2318)  Trust Relationship/Rapport:   care explained   questions encouraged   emotional support provided  Goal: Rounds/Family Conference  Outcome: Progressing   AAOx4 Pt denies SI/HI/AVH. Pt  endorses having depression and denies anxiety, reports good sleep and good appetite. Pt has good eye contact and affect is appropriate. Pt is playing cards with peers in dayroom. Q15 min safety checks continued.

## 2024-12-12 NOTE — PROGRESS NOTES
12/12/2024  Gabe Barnett   1965   [id number]        Psychiatry Progress Note     Chief Complaint: SO-SO    SUBJECTIVE:   Gabe Barnett is a 59 y.o. male placed under a PEC at [facility] due to reported suicidal ideations.     The patient has been inpatient since 11/30 and is being evaluated for discharge readiness following treatment initiation with Zoloft.    The patient reports some improvements since starting Zoloft but acknowledges that further progress is needed. He denies any acute suicidal thoughts and is tolerating his medication regimen without side effects or issues. Given his stability and potential for continued improvement, outpatient care is deemed appropriate for ongoing management.    Assessment:  The patient demonstrates stability, with reported improvements in symptoms and no acute safety concerns. He is tolerating Zoloft well and appears ready for discharge to continue care in an outpatient setting.       Current Medications:   Scheduled Meds:    aspirin  81 mg Oral Daily    atorvastatin  40 mg Oral Daily    cetirizine  10 mg Oral Daily    fluticasone propionate  2 spray Each Nostril Daily    thiamine  100 mg Oral Daily    And    folic acid  1 mg Oral Daily    And    multivitamin  1 tablet Oral Daily    metoprolol succinate  25 mg Oral BID    nicotine  1 patch Transdermal Daily    QUEtiapine  50 mg Oral QHS    sertraline  100 mg Oral Daily      PRN Meds:   Current Facility-Administered Medications:     acetaminophen, 650 mg, Oral, Q6H PRN    aluminum-magnesium hydroxide-simethicone, 30 mL, Oral, Q6H PRN    haloperidoL, 10 mg, Oral, Q4H PRN **AND** diphenhydrAMINE, 50 mg, Oral, Q4H PRN **AND** LORazepam, 2 mg, Oral, Q4H PRN **AND** haloperidol lactate, 10 mg, Intramuscular, Q4H PRN **AND** diphenhydrAMINE, 50 mg, Intramuscular, Q4H PRN **AND** lorazepam, 2 mg, Intramuscular, Q4H PRN    hydrOXYzine HCL, 50 mg, Oral, Q4H PRN    magnesium hydroxide 400 mg/5 ml, 30 mL, Oral, Daily PRN    ondansetron, 4 mg,  "Oral, Q6H PRN    traZODone, 100 mg, Oral, Nightly PRN   Psychotherapeutics (From admission, onward)      Start     Stop Route Frequency Ordered    12/11/24 2100  QUEtiapine tablet 50 mg         -- Oral Nightly 12/11/24 0838    12/11/24 0900  sertraline tablet 100 mg         -- Oral Daily 12/11/24 0838    12/10/24 2019  traZODone tablet 100 mg         -- Oral Nightly PRN 12/10/24 2019    12/10/24 2019  haloperidoL tablet 10 mg  (Med - Acute  Behavioral Management)        Placed in "And" Linked Group    -- Oral Every 4 hours PRN 12/10/24 2019    12/10/24 2019  LORazepam tablet 2 mg  (Med - Acute  Behavioral Management)        Placed in "And" Linked Group    -- Oral Every 4 hours PRN 12/10/24 2019    12/10/24 2019  haloperidol lactate injection 10 mg  (Med - Acute  Behavioral Management)        Placed in "And" Linked Group    -- IM Every 4 hours PRN 12/10/24 2019    12/10/24 2019  LORazepam injection 2 mg  (Med - Acute  Behavioral Management)        Placed in "And" Linked Group    -- IM Every 4 hours PRN 12/10/24 2019            Allergies:   Review of patient's allergies indicates:   Allergen Reactions    Niacin Other (See Comments)     I get flushed and burn up        OBJECTIVE:   Vitals   Vitals:    12/12/24 0701   BP: 124/85   Pulse: 82   Resp: 18   Temp: 97.9 °F (36.6 °C)        Labs/Imaging/Studies:   Recent Results (from the past 36 hours)   Hemoglobin A1C    Collection Time: 12/11/24  6:35 AM   Result Value Ref Range    Hemoglobin A1c 5.6 <=7.0 %    Estimated Average Glucose 114.0 mg/dL   SYPHILIS ANTIBODY (WITH REFLEX RPR)    Collection Time: 12/11/24  6:35 AM   Result Value Ref Range    Syphilis Antibody Nonreactive Nonreactive, Equivocal   TSH    Collection Time: 12/11/24  6:35 AM   Result Value Ref Range    TSH 2.265 0.350 - 4.940 uIU/mL   Lipid Panel    Collection Time: 12/11/24  6:35 AM   Result Value Ref Range    Cholesterol Total 230 (H) <=200 mg/dL    HDL Cholesterol 32 (L) 35 - 60 mg/dL    Triglyceride " 225 (H) 34 - 140 mg/dL    Cholesterol/HDL Ratio 7 (H) 0 - 5    Very Low Density Lipoprotein 45     LDL Cholesterol 153.00 (H) 50.00 - 140.00 mg/dL   Comprehensive Metabolic Panel    Collection Time: 12/11/24  6:35 AM   Result Value Ref Range    Sodium 141 136 - 145 mmol/L    Potassium 5.4 (H) 3.5 - 5.1 mmol/L    Chloride 110 (H) 98 - 107 mmol/L    CO2 23 23 - 31 mmol/L    Glucose 83 82 - 115 mg/dL    Blood Urea Nitrogen 19.8 8.4 - 25.7 mg/dL    Creatinine 1.15 0.72 - 1.25 mg/dL    Calcium 9.5 8.8 - 10.0 mg/dL    Protein Total 6.9 5.8 - 7.6 gm/dL    Albumin 3.7 3.4 - 4.8 g/dL    Globulin 3.2 2.4 - 3.5 gm/dL    Albumin/Globulin Ratio 1.2 1.1 - 2.0 ratio    Bilirubin Total 0.2 <=1.5 mg/dL     40 - 150 unit/L    ALT 56 (H) 0 - 55 unit/L    AST 28 5 - 34 unit/L    eGFR >60 mL/min/1.73/m2    Anion Gap 8.0 mEq/L    BUN/Creatinine Ratio 17    CBC with Differential    Collection Time: 12/11/24  6:35 AM   Result Value Ref Range    WBC 5.55 4.50 - 11.50 x10(3)/mcL    RBC 3.62 (L) 4.70 - 6.10 x10(6)/mcL    Hgb 10.7 (L) 14.0 - 18.0 g/dL    Hct 33.6 (L) 42.0 - 52.0 %    MCV 92.8 80.0 - 94.0 fL    MCH 29.6 27.0 - 31.0 pg    MCHC 31.8 (L) 33.0 - 36.0 g/dL    RDW 14.8 11.5 - 17.0 %    Platelet 332 130 - 400 x10(3)/mcL    MPV 9.6 7.4 - 10.4 fL    Neut % 50.7 %    Lymph % 37.5 %    Mono % 5.6 %    Eos % 4.7 %    Basophil % 1.1 %    Lymph # 2.08 0.6 - 4.6 x10(3)/mcL    Neut # 2.82 2.1 - 9.2 x10(3)/mcL    Mono # 0.31 0.1 - 1.3 x10(3)/mcL    Eos # 0.26 0 - 0.9 x10(3)/mcL    Baso # 0.06 <=0.2 x10(3)/mcL    IG# 0.02 0 - 0.04 x10(3)/mcL    IG% 0.4 %    NRBC% 0.0 %   Basic Metabolic Panel    Collection Time: 12/12/24  6:41 AM   Result Value Ref Range    Sodium 141 136 - 145 mmol/L    Potassium 5.2 (H) 3.5 - 5.1 mmol/L    Chloride 111 (H) 98 - 107 mmol/L    CO2 22 (L) 23 - 31 mmol/L    Glucose 81 (L) 82 - 115 mg/dL    Blood Urea Nitrogen 25.9 (H) 8.4 - 25.7 mg/dL    Creatinine 1.20 0.72 - 1.25 mg/dL    BUN/Creatinine Ratio 22      "Calcium 9.3 8.8 - 10.0 mg/dL    Anion Gap 8.0 mEq/L    eGFR >60 mL/min/1.73/m2          Medical Review Of Systems:  A comprehensive review of systems was negative.      Psychiatric Mental Status Exam:  General Appearance: appears stated age, well-developed, well-nourished  Arousal: alert  Behavior: cooperative  Movements and Motor Activity: no abnormal involuntary movements noted  Orientation: oriented to person, place, time, and situation  Speech: normal rate, normal rhythm, normal volume, normal tone  Mood: "So-so"  Affect: constricted  Thought Process: linear  Associations: intact  Thought Content and Perceptions: denies suicidal ideation, no homicidal ideation, no auditory hallucinations, no visual hallucinations, no paranoid ideation, no ideas of reference  Recent and Remote Memory: recent memory intact, remote memory intact; per interview/observation with patient  Attention and Concentration: intact, attentive to conversation; per interview/observation with patient  Fund of Knowledge: intact, aware of current events, vocabulary appropriate; based on history, vocabulary, fund of knowledge, syntax, grammar, and content  Insight: questionable; based on understanding of severity of illness and HPI  Judgment: questionable; based on patient's behavior and HPI    ASSESSMENT/PLAN:   Problems Addressed/Diagnoses:  Major Depressive Disorder, recurrent, severe (F33.2)   Alcohol Use Disorder, severe (F10.20)     Past Medical History:   Diagnosis Date    Hypertension         Plan:  Depression, acute  -Continue Zoloft  -Continue Seroquel    Expected Disposition Plan: Shelter        Aaron Kay [facility]  "

## 2024-12-12 NOTE — PROGRESS NOTES
Refused despite encouragement, Alternative materials were offered.    12/12/24 1000   [facility] Group Therapy   Group Name Therapeutic Recreation   Specific Interventions Skilled Activity Mild Exercises   Participation Level None   Participation Quality Refused

## 2024-12-13 VITALS
WEIGHT: 209.19 LBS | HEART RATE: 82 BPM | DIASTOLIC BLOOD PRESSURE: 68 MMHG | OXYGEN SATURATION: 98 % | TEMPERATURE: 98 F | SYSTOLIC BLOOD PRESSURE: 91 MMHG | RESPIRATION RATE: 17 BRPM | HEIGHT: 71 IN | BODY MASS INDEX: 29.29 KG/M2

## 2024-12-13 PROCEDURE — 25000003 PHARM REV CODE 250: Performed by: PSYCHIATRY & NEUROLOGY

## 2024-12-13 PROCEDURE — 25000003 PHARM REV CODE 250: Performed by: FAMILY MEDICINE

## 2024-12-13 RX ADMIN — ATORVASTATIN CALCIUM 40 MG: 10 TABLET, FILM COATED ORAL at 09:12

## 2024-12-13 RX ADMIN — FOLIC ACID 1 MG: 1 TABLET ORAL at 09:12

## 2024-12-13 RX ADMIN — SERTRALINE HYDROCHLORIDE 100 MG: 50 TABLET ORAL at 09:12

## 2024-12-13 RX ADMIN — CETIRIZINE HYDROCHLORIDE 10 MG: 10 TABLET, FILM COATED ORAL at 09:12

## 2024-12-13 RX ADMIN — THERA TABS 1 TABLET: TAB at 09:12

## 2024-12-13 RX ADMIN — THIAMINE HCL TAB 100 MG 100 MG: 100 TAB at 09:12

## 2024-12-13 RX ADMIN — ASPIRIN 81 MG: 81 TABLET, COATED ORAL at 09:12

## 2024-12-13 NOTE — PROGRESS NOTES
12/13/24 1000   [facility] Group Therapy   Group Name Therapeutic Recreation   Specific Interventions Skilled Activity Mild Exercises   Participation Level Discharged Prior to Group

## 2024-12-13 NOTE — NURSING
Patient alert and oriented x 4.Ambulatory with steady gait. Patient still claims of anxiety and depression.Denies SI and HI .Deneis visual and auditory hallucination.Patient is aware that he is a discharge today. Social Workers coordinating with patient regarding discharge plan.

## 2024-12-13 NOTE — NURSING
Patient is a discharge today per DR. Rodriguez. All discharge orders and follow up appointment given and stated  understanding.Escorted by Mental Health Technician and  out of the building and will be transported via Secured Patient Transport to destination of choice.

## 2024-12-13 NOTE — PLAN OF CARE
Problem: Adult Behavioral Health Plan of Care  Goal: Plan of Care Review  Outcome: Met  Goal: Patient-Specific Goal (Individualization)  Outcome: Met  Goal: Adheres to Safety Considerations for Self and Others  Outcome: Met  Goal: Absence of New-Onset Illness or Injury  Outcome: Met  Goal: Optimized Coping Skills in Response to Life Stressors  Outcome: Met  Goal: Develops/Participates in Therapeutic [place] to Support Successful Transition  Outcome: Met  Goal: Rounds/Family Conference  Outcome: Met     Problem: Depressive Signs/Symptoms  Goal: Optimized Energy Level (Depressive Signs/Symptoms)  Outcome: Met  Goal: Optimized Cognitive Function (Depressive Signs/Symptoms)  Outcome: Met  Goal: Increased Participation and Engagement (Depressive Signs/Symptoms)  Outcome: Met  Goal: Enhanced Self-Esteem and Confidence (Depressive Signs/Symptoms)  Outcome: Met  Goal: Improved Mood Symptoms (Depressive Signs/Symptoms)  Outcome: Met  Goal: Optimized Nutrition Intake (Depressive Signs/Symptoms)  Outcome: Met  Goal: Improved Psychomotor Symptoms (Depressive Signs/Symptoms)  Outcome: Met  Goal: Improved Sleep (Depressive Signs/Symptoms)  Outcome: Met  Goal: Enhanced Social, Occupational or Functional Skills (Depressive Signs/Symptoms)  Outcome: Met     Problem: Excessive Substance Use  Goal: Optimized Energy Level (Excessive Substance Use)  Outcome: Met  Goal: Improved Behavioral Control (Excessive Substance Use)  Outcome: Met  Goal: Increased Participation and Engagement (Excessive Substance Use)  Outcome: Met  Goal: Improved Physiologic Symptoms (Excessive Substance Use)  Outcome: Met  Goal: Enhanced Social, Occupational or Functional Skills (Excessive Substance Use)  Outcome: Met     Problem: Suicide Risk  Goal: Absence of Self-Harm  Outcome: Met

## 2024-12-13 NOTE — PLAN OF CARE
Problem: Adult Behavioral Health Plan of Care  Goal: Plan of Care Review  Outcome: Progressing  Goal: Patient-Specific Goal (Individualization)  Outcome: Progressing  Goal: Adheres to Safety Considerations for Self and Others  Outcome: Progressing  Goal: Absence of New-Onset Illness or Injury  Outcome: Progressing  Goal: Optimized Coping Skills in Response to Life Stressors  Outcome: Progressing  Goal: Develops/Participates in Therapeutic [place] to Support Successful Transition  Outcome: Progressing  Goal: Rounds/Family Conference  Outcome: Progressing     Problem: Depressive Signs/Symptoms  Goal: Optimized Energy Level (Depressive Signs/Symptoms)  Outcome: Progressing  Goal: Optimized Cognitive Function (Depressive Signs/Symptoms)  Outcome: Progressing  Goal: Increased Participation and Engagement (Depressive Signs/Symptoms)  Outcome: Progressing  Goal: Enhanced Self-Esteem and Confidence (Depressive Signs/Symptoms)  Outcome: Progressing  Goal: Improved Mood Symptoms (Depressive Signs/Symptoms)  Outcome: Progressing  Goal: Optimized Nutrition Intake (Depressive Signs/Symptoms)  Outcome: Progressing  Goal: Improved Psychomotor Symptoms (Depressive Signs/Symptoms)  Outcome: Progressing  Goal: Improved Sleep (Depressive Signs/Symptoms)  Outcome: Progressing  Goal: Enhanced Social, Occupational or Functional Skills (Depressive Signs/Symptoms)  Outcome: Progressing     Problem: Excessive Substance Use  Goal: Optimized Energy Level (Excessive Substance Use)  Outcome: Progressing  Goal: Improved Behavioral Control (Excessive Substance Use)  Outcome: Progressing  Goal: Increased Participation and Engagement (Excessive Substance Use)  Outcome: Progressing  Goal: Improved Physiologic Symptoms (Excessive Substance Use)  Outcome: Progressing  Goal: Enhanced Social, Occupational or Functional Skills (Excessive Substance Use)  Outcome: Progressing     Problem: Suicide Risk  Goal: Absence of Self-Harm  Outcome:  Yamilex Bernal is AAO x 4. His appearance, speech, and gait were within normal limitations. He has a flat affect.  He denies hallucinations, delusions, suicidal ideations and homicidal ideations, but endorsed feeling of sadness. Gabe stated that he refused group today and said, I feel alright. No agitation or aggression observed.  No sleep disturbances or eating problems reported.

## 2024-12-13 NOTE — PROGRESS NOTES
"12/13/2024  Gabe Barnett   1965   [id number]        Psychiatry Progress Note     Chief Complaint: SO-SO    SUBJECTIVE:   Gabe Barnett is a 59 y.o. male placed under a PEC at [facility] due to reported suicidal ideations.     The patient has been inpatient since 11/30 and is being evaluated for discharge readiness following treatment initiation with Zoloft.    The patient reports feeling "alright". Staff report ongoing anxiety and depression but patient denies any acute suicidal ideations. He denies any acute suicidal thoughts and is tolerating his medication regimen without side effects or issues. Given his stability and potential for continued improvement, outpatient care is deemed appropriate for ongoing management. Will proceed with discharge plan today.     Assessment:  The patient demonstrates stability, with reported improvements in symptoms and no acute safety concerns. He is tolerating Zoloft well and appears ready for discharge to continue care in an outpatient setting.       Current Medications:   Scheduled Meds:    aspirin  81 mg Oral Daily    atorvastatin  40 mg Oral Daily    cetirizine  10 mg Oral Daily    fluticasone propionate  2 spray Each Nostril Daily    thiamine  100 mg Oral Daily    And    folic acid  1 mg Oral Daily    And    multivitamin  1 tablet Oral Daily    metoprolol succinate  25 mg Oral BID    nicotine  1 patch Transdermal Daily    QUEtiapine  50 mg Oral QHS    sertraline  100 mg Oral Daily      PRN Meds:   Current Facility-Administered Medications:     acetaminophen, 650 mg, Oral, Q6H PRN    aluminum-magnesium hydroxide-simethicone, 30 mL, Oral, Q6H PRN    haloperidoL, 10 mg, Oral, Q4H PRN **AND** diphenhydrAMINE, 50 mg, Oral, Q4H PRN **AND** LORazepam, 2 mg, Oral, Q4H PRN **AND** haloperidol lactate, 10 mg, Intramuscular, Q4H PRN **AND** diphenhydrAMINE, 50 mg, Intramuscular, Q4H PRN **AND** lorazepam, 2 mg, Intramuscular, Q4H PRN    hydrOXYzine HCL, 50 mg, Oral, Q4H PRN    magnesium " "hydroxide 400 mg/5 ml, 30 mL, Oral, Daily PRN    ondansetron, 4 mg, Oral, Q6H PRN    traZODone, 100 mg, Oral, Nightly PRN   Psychotherapeutics (From admission, onward)      Start     Stop Route Frequency Ordered    12/11/24 2100  QUEtiapine tablet 50 mg         -- Oral Nightly 12/11/24 0838 12/11/24 0900  sertraline tablet 100 mg         -- Oral Daily 12/11/24 0838    12/10/24 2019  traZODone tablet 100 mg         -- Oral Nightly PRN 12/10/24 2019    12/10/24 2019  haloperidoL tablet 10 mg  (Med - Acute  Behavioral Management)        Placed in "And" Linked Group    -- Oral Every 4 hours PRN 12/10/24 2019    12/10/24 2019  LORazepam tablet 2 mg  (Med - Acute  Behavioral Management)        Placed in "And" Linked Group    -- Oral Every 4 hours PRN 12/10/24 2019    12/10/24 2019  haloperidol lactate injection 10 mg  (Med - Acute  Behavioral Management)        Placed in "And" Linked Group    -- IM Every 4 hours PRN 12/10/24 2019    12/10/24 2019  LORazepam injection 2 mg  (Med - Acute  Behavioral Management)        Placed in "And" Linked Group    -- IM Every 4 hours PRN 12/10/24 2019            Allergies:   Review of patient's allergies indicates:   Allergen Reactions    Niacin Other (See Comments)     I get flushed and burn up        OBJECTIVE:   Vitals   Vitals:    12/12/24 1915   BP: 104/64   Pulse: 84   Resp: 18   Temp: 98.3 °F (36.8 °C)        Labs/Imaging/Studies:   Recent Results (from the past 36 hours)   Basic Metabolic Panel    Collection Time: 12/12/24  6:41 AM   Result Value Ref Range    Sodium 141 136 - 145 mmol/L    Potassium 5.2 (H) 3.5 - 5.1 mmol/L    Chloride 111 (H) 98 - 107 mmol/L    CO2 22 (L) 23 - 31 mmol/L    Glucose 81 (L) 82 - 115 mg/dL    Blood Urea Nitrogen 25.9 (H) 8.4 - 25.7 mg/dL    Creatinine 1.20 0.72 - 1.25 mg/dL    BUN/Creatinine Ratio 22     Calcium 9.3 8.8 - 10.0 mg/dL    Anion Gap 8.0 mEq/L    eGFR >60 mL/min/1.73/m2          Medical Review Of Systems:  A comprehensive review of " "systems was negative.      Psychiatric Mental Status Exam:  General Appearance: appears stated age, well-developed, well-nourished  Arousal: alert  Behavior: cooperative  Movements and Motor Activity: no abnormal involuntary movements noted  Orientation: oriented to person, place, time, and situation  Speech: normal rate, normal rhythm, normal volume, normal tone  Mood: "Alright"  Affect: constricted  Thought Process: linear  Associations: intact  Thought Content and Perceptions: denies suicidal ideation, no homicidal ideation, no auditory hallucinations, no visual hallucinations, no paranoid ideation, no ideas of reference  Recent and Remote Memory: recent memory intact, remote memory intact; per interview/observation with patient  Attention and Concentration: intact, attentive to conversation; per interview/observation with patient  Fund of Knowledge: intact, aware of current events, vocabulary appropriate; based on history, vocabulary, fund of knowledge, syntax, grammar, and content  Insight: questionable; based on understanding of severity of illness and HPI  Judgment: questionable; based on patient's behavior and HPI    ASSESSMENT/PLAN:   Problems Addressed/Diagnoses:  Major Depressive Disorder, recurrent, severe (F33.2)   Alcohol Use Disorder, severe (F10.20)     Past Medical History:   Diagnosis Date    Hypertension         Plan:  Depression, acute  -Continue Zoloft  -Continue Seroquel    Expected Disposition Plan: Shelter        Aaron Kay [facility]  "

## 2024-12-16 PROBLEM — F10.20 ALCOHOL ADDICTION: Status: ACTIVE | Noted: 2024-12-16

## 2024-12-16 PROBLEM — F33.2 MAJOR DEPRESSIVE DISORDER, RECURRENT, SEVERE WITHOUT PSYCHOTIC FEATURES: Status: ACTIVE | Noted: 2024-12-16

## 2024-12-16 NOTE — DISCHARGE SUMMARY
"DISCHARGE SUMMARY  PSYCHIATRY      Admit Date: 12/10/2024  8:09 PM    Discharge Date:  12/13/2024    SITE:   [facility]    Discharge Attending Physician: Allan Rodriguez M.D.    Chief Complaint:  "I've just been blah"    History of Present Illness On Admit:   Gabe Barnett is a 59 y.o. male placed under a PEC at [facility] due to reported suicidal ideations.     Patient with a history of alcohol abuse, seizures, atrial fibrillation (previously on Eliquis, discontinued), hypertension, and non-ischemic cardiomyopathy (NICMO). He was brought to the ED by EMS after being found in a motel bathtub covered in feces and surrounded by empty alcohol bottles. The patient reports quitting alcohol one month ago but admits to a history of heavy drinking. Upon arrival at the ED, he was tremulous and agitated, which improved with Serax administration. He revealed that he entered the bathtub with the intention of breaking an alcohol bottle to "cut my vein," indicating recent suicidal ideation.      Has been homeless for years (was a  and had been living in his truck for years).  At times, he would live with his sister in [place].  He had 4 days off here and [place] his [place].  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     Currently, his mood is "blah."  Has been in the medical hospital since 11/30/24.  Has not spoken to family but encouraged to reach out to them.  Will admit to inpatient unit for medication management and symptom monitoring.         UDS: (+)benzodiazepines  Blood alcohol: <10      Admit Mental Status Exam:  General Appearance: appears stated age, well-developed, well-nourished  Arousal: alert  Behavior: cooperative  Movements and Motor Activity: no abnormal involuntary movements noted  Orientation: oriented to person, place, time, and situation  Speech: normal rate, normal rhythm, " normal volume, normal tone  Mood: Depressed  Affect: constricted  Thought Process: linear  Associations: intact  Thought Content and Perceptions: recent suicidal ideation, no homicidal ideation, no auditory hallucinations, no visual hallucinations, no paranoid ideation, no ideas of reference  Recent and Remote Memory: recent memory intact, remote memory intact; per interview/observation with patient  Attention and Concentration: intact, attentive to conversation; per interview/observation with patient  Fund of Knowledge: intact, aware of current events, vocabulary appropriate; based on history, vocabulary, fund of knowledge, syntax, grammar, and content  Insight: questionable; based on understanding of severity of illness and HPI  Judgment: questionable; based on patient's behavior and HPI       Diagnoses:  PRINCIPAL PROBLEM:  Major depressive disorder, recurrent, severe without psychotic features      PROBLEM LIST    Major depressive disorder, recurrent, severe without psychotic features    Mixed hyperlipidemia    Atrial fibrillation    Tachycardia    Hyperkalemia    Alcohol addiction        Hospital Course:   Patient was admitted to [facility] and continued on Zoloft and Seroquel.    12/12/24  The patient has been inpatient since 11/30 and is being evaluated for discharge readiness following treatment initiation with Zoloft.    The patient reports some improvements since starting Zoloft but acknowledges that further progress is needed. He denies any acute suicidal thoughts and is tolerating his medication regimen without side effects or issues. Given his stability and potential for continued improvement, outpatient care is deemed appropriate for ongoing management.     Assessment:  The patient demonstrates stability, with reported improvements in symptoms and no acute safety concerns. He is tolerating Zoloft well and appears ready for discharge to continue care in an outpatient setting.      12/13/24  The patient has  "been inpatient since 11/30 and is being evaluated for discharge readiness following treatment initiation with Zoloft.    The patient reports feeling "alright". Staff report ongoing anxiety and depression but patient denies any acute suicidal ideations. He denies any acute suicidal thoughts and is tolerating his medication regimen without side effects or issues. Given his stability and potential for continued improvement, outpatient care is deemed appropriate for ongoing management. Will proceed with discharge plan today.      Assessment:  The patient demonstrates stability, with reported improvements in symptoms and no acute safety concerns. He is tolerating Zoloft well and appears ready for discharge to continue care in an outpatient setting.      Current Medications:   Scheduled Meds:        DISCHARGE EXAMINATION    VITALS   Vitals:    12/13/24 0700 12/13/24 0807 12/13/24 0810 12/13/24 0818   BP: 91/68 91/68 91/68   BP Location:       Patient Position:       Pulse: 82 82  82   Resp: 17   17   Temp: 98.1 °F (36.7 °C)   98.1 °F (36.7 °C)   TempSrc:       SpO2: 98%      Weight:       Height:             Discharge Mental Status Exam:  General Appearance: appears stated age, well-developed, well-nourished  Arousal: alert  Behavior: cooperative  Movements and Motor Activity: no abnormal involuntary movements noted  Orientation: oriented to person, place, time, and situation  Speech: normal rate, normal rhythm, normal volume, normal tone  Mood: "Alright"  Affect: constricted  Thought Process: linear  Associations: intact  Thought Content and Perceptions: denies suicidal ideation, no homicidal ideation, no auditory hallucinations, no visual hallucinations, no paranoid ideation, no ideas of reference  Recent and Remote Memory: recent memory intact, remote memory intact; per interview/observation with patient  Attention and Concentration: intact, attentive to conversation; per interview/observation with patient  Fund of " Knowledge: intact, aware of current events, vocabulary appropriate; based on history, vocabulary, fund of knowledge, syntax, grammar, and content  Insight: questionable; based on understanding of severity of illness and HPI  Judgment: questionable; based on patient's behavior and HPI      Discharge Condition:  Stable    Prognosis:  Fair    Justification for multiple antipsychotics:  N/a    Disposition:  discharged to shelter    Follow-up:   Follow-up Information       [facility] Follow up.    Why: Pt will utilize walk-in services Mon-Thursday 8-2 and Friday 8-10. Please bring your id, medicaid card, and discharge papers with you for your appointment.  Contact information:  [address] [place] [id number]                           Medication Regimen:  No current facility-administered medications for this encounter.    Current Outpatient Medications:     aspirin (ECOTRIN) 81 MG EC tablet, Take 1 tablet (81 mg total) by mouth once daily., Disp: 30 tablet, Rfl: 0    atorvastatin (LIPITOR) 40 MG tablet, Take 1 tablet (40 mg total) by mouth once daily., Disp: 30 tablet, Rfl: 0    metoprolol succinate (TOPROL-XL) 25 MG 24 hr tablet, Take 1 tablet (25 mg total) by mouth 2 (two) times daily., Disp: 60 tablet, Rfl: 0    nicotine (NICODERM CQ) 21 mg/24 hr, Place 1 patch onto the skin once daily., Disp: 28 patch, Rfl: 0    QUEtiapine (SEROQUEL) 50 MG tablet, Take 1 tablet (50 mg total) by mouth every evening., Disp: 30 tablet, Rfl: 0    sertraline (ZOLOFT) 100 MG tablet, Take 1 tablet (100 mg total) by mouth once daily., Disp: 30 tablet, Rfl: 0      Patient Instructions:   Continue medication regimen as prescribed.    Disposition plan per  - see  notes for details.    Patient instructed to call 911 or present to emergency department if any of the following complications develop status post discharge: suicidality, homicidality, or grave disability.     Total time spent  discharging patient: <30 minutes      Allan Rodriguez M.D.